# Patient Record
Sex: MALE | Race: WHITE | NOT HISPANIC OR LATINO | Employment: OTHER | URBAN - METROPOLITAN AREA
[De-identification: names, ages, dates, MRNs, and addresses within clinical notes are randomized per-mention and may not be internally consistent; named-entity substitution may affect disease eponyms.]

---

## 2021-01-15 DIAGNOSIS — Z23 NEED FOR VACCINATION: ICD-10-CM

## 2024-05-28 ENCOUNTER — HOSPITAL ENCOUNTER (OUTPATIENT)
Dept: RADIOLOGY | Facility: MEDICAL CENTER | Age: 79
End: 2024-05-28
Attending: UROLOGY
Payer: MEDICARE

## 2024-05-28 DIAGNOSIS — C61 MALIGNANT NEOPLASM OF PROSTATE (HCC): ICD-10-CM

## 2024-07-08 ENCOUNTER — APPOINTMENT (OUTPATIENT)
Dept: RADIOLOGY | Facility: MEDICAL CENTER | Age: 79
End: 2024-07-08
Attending: UROLOGY
Payer: MEDICARE

## 2024-07-08 DIAGNOSIS — C61 MALIGNANT NEOPLASM PROSTATE (HCC): ICD-10-CM

## 2024-07-08 PROCEDURE — 72197 MRI PELVIS W/O & W/DYE: CPT

## 2024-07-08 PROCEDURE — 700111 HCHG RX REV CODE 636 W/ 250 OVERRIDE (IP): Mod: JZ,JG | Performed by: RADIOLOGY

## 2024-07-08 PROCEDURE — A9579 GAD-BASE MR CONTRAST NOS,1ML: HCPCS | Mod: JZ

## 2024-07-08 PROCEDURE — 700117 HCHG RX CONTRAST REV CODE 255: Mod: JZ

## 2024-07-08 RX ADMIN — GLUCAGON 1 MG: 1 INJECTION, POWDER, LYOPHILIZED, FOR SOLUTION INTRAMUSCULAR; INTRAVENOUS at 09:45

## 2024-07-08 RX ADMIN — GADOTERIDOL 20 ML: 279.3 INJECTION, SOLUTION INTRAVENOUS at 10:26

## 2024-08-19 ENCOUNTER — HOSPITAL ENCOUNTER (INPATIENT)
Facility: MEDICAL CENTER | Age: 79
LOS: 3 days | DRG: 193 | End: 2024-08-22
Attending: EMERGENCY MEDICINE | Admitting: STUDENT IN AN ORGANIZED HEALTH CARE EDUCATION/TRAINING PROGRAM
Payer: MEDICARE

## 2024-08-19 ENCOUNTER — APPOINTMENT (OUTPATIENT)
Dept: CARDIOLOGY | Facility: MEDICAL CENTER | Age: 79
DRG: 193 | End: 2024-08-19
Attending: STUDENT IN AN ORGANIZED HEALTH CARE EDUCATION/TRAINING PROGRAM
Payer: MEDICARE

## 2024-08-19 ENCOUNTER — APPOINTMENT (OUTPATIENT)
Dept: RADIOLOGY | Facility: MEDICAL CENTER | Age: 79
DRG: 193 | End: 2024-08-19
Attending: EMERGENCY MEDICINE
Payer: MEDICARE

## 2024-08-19 ENCOUNTER — APPOINTMENT (OUTPATIENT)
Dept: RADIOLOGY | Facility: MEDICAL CENTER | Age: 79
DRG: 193 | End: 2024-08-19
Attending: STUDENT IN AN ORGANIZED HEALTH CARE EDUCATION/TRAINING PROGRAM
Payer: MEDICARE

## 2024-08-19 DIAGNOSIS — I05.0 SEVERE MITRAL VALVE STENOSIS: Chronic | ICD-10-CM

## 2024-08-19 DIAGNOSIS — R09.02 HYPOXIA: ICD-10-CM

## 2024-08-19 DIAGNOSIS — R01.1 MURMUR, CARDIAC: ICD-10-CM

## 2024-08-19 DIAGNOSIS — R79.89 ELEVATED BRAIN NATRIURETIC PEPTIDE (BNP) LEVEL: ICD-10-CM

## 2024-08-19 DIAGNOSIS — J18.9 PNEUMONIA OF RIGHT LUNG DUE TO INFECTIOUS ORGANISM, UNSPECIFIED PART OF LUNG: ICD-10-CM

## 2024-08-19 DIAGNOSIS — I35.0 SEVERE AORTIC STENOSIS: Chronic | ICD-10-CM

## 2024-08-19 DIAGNOSIS — I50.9 ACUTE CONGESTIVE HEART FAILURE, UNSPECIFIED HEART FAILURE TYPE (HCC): ICD-10-CM

## 2024-08-19 PROBLEM — J81.0 ACUTE PULMONARY EDEMA (HCC): Status: ACTIVE | Noted: 2024-08-19

## 2024-08-19 PROBLEM — I34.0 NONRHEUMATIC MITRAL VALVE REGURGITATION: Status: ACTIVE | Noted: 2024-08-19

## 2024-08-19 PROBLEM — C61 PROSTATE CANCER (HCC): Status: ACTIVE | Noted: 2024-08-19

## 2024-08-19 PROBLEM — J96.01 ACUTE RESPIRATORY FAILURE WITH HYPOXIA (HCC): Status: ACTIVE | Noted: 2024-08-19

## 2024-08-19 PROBLEM — E11.65 TYPE 2 DIABETES MELLITUS WITH HYPERGLYCEMIA, WITHOUT LONG-TERM CURRENT USE OF INSULIN (HCC): Status: ACTIVE | Noted: 2024-08-19

## 2024-08-19 PROBLEM — E87.29 HIGH ANION GAP METABOLIC ACIDOSIS: Status: ACTIVE | Noted: 2024-08-19

## 2024-08-19 PROBLEM — I10 PRIMARY HYPERTENSION: Status: ACTIVE | Noted: 2024-08-19

## 2024-08-19 LAB
ALBUMIN SERPL BCP-MCNC: 4.1 G/DL (ref 3.2–4.9)
ALBUMIN/GLOB SERPL: 1.3 G/DL
ALP SERPL-CCNC: 43 U/L (ref 30–99)
ALT SERPL-CCNC: 20 U/L (ref 2–50)
ANION GAP SERPL CALC-SCNC: 16 MMOL/L (ref 7–16)
APPEARANCE UR: CLEAR
AST SERPL-CCNC: 25 U/L (ref 12–45)
BASOPHILS # BLD AUTO: 0.1 % (ref 0–1.8)
BASOPHILS # BLD: 0.02 K/UL (ref 0–0.12)
BILIRUB SERPL-MCNC: 1 MG/DL (ref 0.1–1.5)
BILIRUB UR QL STRIP.AUTO: NEGATIVE
BUN SERPL-MCNC: 32 MG/DL (ref 8–22)
CALCIUM ALBUM COR SERPL-MCNC: 10 MG/DL (ref 8.5–10.5)
CALCIUM SERPL-MCNC: 10.1 MG/DL (ref 8.5–10.5)
CHLORIDE SERPL-SCNC: 104 MMOL/L (ref 96–112)
CO2 SERPL-SCNC: 20 MMOL/L (ref 20–33)
COLOR UR: YELLOW
CREAT SERPL-MCNC: 1.1 MG/DL (ref 0.5–1.4)
D DIMER PPP IA.FEU-MCNC: 0.5 UG/ML (FEU) (ref 0–0.5)
EKG IMPRESSION: NORMAL
EOSINOPHIL # BLD AUTO: 0 K/UL (ref 0–0.51)
EOSINOPHIL NFR BLD: 0 % (ref 0–6.9)
ERYTHROCYTE [DISTWIDTH] IN BLOOD BY AUTOMATED COUNT: 50.7 FL (ref 35.9–50)
FLUAV RNA SPEC QL NAA+PROBE: NEGATIVE
FLUBV RNA SPEC QL NAA+PROBE: NEGATIVE
GFR SERPLBLD CREATININE-BSD FMLA CKD-EPI: 68 ML/MIN/1.73 M 2
GLOBULIN SER CALC-MCNC: 3.2 G/DL (ref 1.9–3.5)
GLUCOSE SERPL-MCNC: 194 MG/DL (ref 65–99)
GLUCOSE UR STRIP.AUTO-MCNC: NEGATIVE MG/DL
HCT VFR BLD AUTO: 42.5 % (ref 42–52)
HGB BLD-MCNC: 14.1 G/DL (ref 14–18)
HOLDING TUBE BB 8507: NORMAL
IMM GRANULOCYTES # BLD AUTO: 0.09 K/UL (ref 0–0.11)
IMM GRANULOCYTES NFR BLD AUTO: 0.6 % (ref 0–0.9)
KETONES UR STRIP.AUTO-MCNC: NEGATIVE MG/DL
LACTATE SERPL-SCNC: 1.2 MMOL/L (ref 0.5–2)
LACTATE SERPL-SCNC: 1.7 MMOL/L (ref 0.5–2)
LACTATE SERPL-SCNC: 2.5 MMOL/L (ref 0.5–2)
LEUKOCYTE ESTERASE UR QL STRIP.AUTO: NEGATIVE
LV EJECT FRACT  99904: 70
LV EJECT FRACT MOD 2C 99903: 66.26
LV EJECT FRACT MOD 4C 99902: 69.34
LV EJECT FRACT MOD BP 99901: 65.59
LYMPHOCYTES # BLD AUTO: 0.73 K/UL (ref 1–4.8)
LYMPHOCYTES NFR BLD: 4.9 % (ref 22–41)
MCH RBC QN AUTO: 30.3 PG (ref 27–33)
MCHC RBC AUTO-ENTMCNC: 33.2 G/DL (ref 32.3–36.5)
MCV RBC AUTO: 91.2 FL (ref 81.4–97.8)
MICRO URNS: NORMAL
MONOCYTES # BLD AUTO: 1.65 K/UL (ref 0–0.85)
MONOCYTES NFR BLD AUTO: 11.2 % (ref 0–13.4)
NEUTROPHILS # BLD AUTO: 12.3 K/UL (ref 1.82–7.42)
NEUTROPHILS NFR BLD: 83.2 % (ref 44–72)
NITRITE UR QL STRIP.AUTO: NEGATIVE
NRBC # BLD AUTO: 0 K/UL
NRBC BLD-RTO: 0 /100 WBC (ref 0–0.2)
NT-PROBNP SERPL IA-MCNC: 5495 PG/ML (ref 0–125)
PH UR STRIP.AUTO: 5 [PH] (ref 5–8)
PLATELET # BLD AUTO: 175 K/UL (ref 164–446)
PMV BLD AUTO: 11.6 FL (ref 9–12.9)
POTASSIUM SERPL-SCNC: 4 MMOL/L (ref 3.6–5.5)
PROCALCITONIN SERPL-MCNC: 0.1 NG/ML
PROT SERPL-MCNC: 7.3 G/DL (ref 6–8.2)
PROT UR QL STRIP: NEGATIVE MG/DL
RBC # BLD AUTO: 4.66 M/UL (ref 4.7–6.1)
RBC UR QL AUTO: NEGATIVE
RSV RNA SPEC QL NAA+PROBE: NEGATIVE
SARS-COV-2 RNA RESP QL NAA+PROBE: NOTDETECTED
SODIUM SERPL-SCNC: 140 MMOL/L (ref 135–145)
SP GR UR STRIP.AUTO: 1.02
TROPONIN T SERPL-MCNC: 70 NG/L (ref 6–19)
TROPONIN T SERPL-MCNC: 84 NG/L (ref 6–19)
TROPONIN T SERPL-MCNC: 88 NG/L (ref 6–19)
UROBILINOGEN UR STRIP.AUTO-MCNC: 0.2 MG/DL
WBC # BLD AUTO: 14.8 K/UL (ref 4.8–10.8)

## 2024-08-19 PROCEDURE — 87086 URINE CULTURE/COLONY COUNT: CPT

## 2024-08-19 PROCEDURE — 700102 HCHG RX REV CODE 250 W/ 637 OVERRIDE(OP): Performed by: EMERGENCY MEDICINE

## 2024-08-19 PROCEDURE — 93005 ELECTROCARDIOGRAM TRACING: CPT | Performed by: EMERGENCY MEDICINE

## 2024-08-19 PROCEDURE — 36415 COLL VENOUS BLD VENIPUNCTURE: CPT

## 2024-08-19 PROCEDURE — 83605 ASSAY OF LACTIC ACID: CPT | Mod: 91

## 2024-08-19 PROCEDURE — 770020 HCHG ROOM/CARE - TELE (206)

## 2024-08-19 PROCEDURE — A9270 NON-COVERED ITEM OR SERVICE: HCPCS | Performed by: STUDENT IN AN ORGANIZED HEALTH CARE EDUCATION/TRAINING PROGRAM

## 2024-08-19 PROCEDURE — 71250 CT THORAX DX C-: CPT

## 2024-08-19 PROCEDURE — 93005 ELECTROCARDIOGRAM TRACING: CPT

## 2024-08-19 PROCEDURE — 93306 TTE W/DOPPLER COMPLETE: CPT

## 2024-08-19 PROCEDURE — 80053 COMPREHEN METABOLIC PANEL: CPT

## 2024-08-19 PROCEDURE — 81003 URINALYSIS AUTO W/O SCOPE: CPT

## 2024-08-19 PROCEDURE — 700111 HCHG RX REV CODE 636 W/ 250 OVERRIDE (IP): Mod: JZ | Performed by: STUDENT IN AN ORGANIZED HEALTH CARE EDUCATION/TRAINING PROGRAM

## 2024-08-19 PROCEDURE — 96375 TX/PRO/DX INJ NEW DRUG ADDON: CPT

## 2024-08-19 PROCEDURE — 700111 HCHG RX REV CODE 636 W/ 250 OVERRIDE (IP): Mod: JZ | Performed by: EMERGENCY MEDICINE

## 2024-08-19 PROCEDURE — 83880 ASSAY OF NATRIURETIC PEPTIDE: CPT

## 2024-08-19 PROCEDURE — 700105 HCHG RX REV CODE 258: Performed by: EMERGENCY MEDICINE

## 2024-08-19 PROCEDURE — 99285 EMERGENCY DEPT VISIT HI MDM: CPT

## 2024-08-19 PROCEDURE — 0241U HCHG SARS-COV-2 COVID-19 NFCT DS RESP RNA 4 TRGT ED POC: CPT

## 2024-08-19 PROCEDURE — 93306 TTE W/DOPPLER COMPLETE: CPT | Mod: 26 | Performed by: INTERNAL MEDICINE

## 2024-08-19 PROCEDURE — 71045 X-RAY EXAM CHEST 1 VIEW: CPT

## 2024-08-19 PROCEDURE — 700102 HCHG RX REV CODE 250 W/ 637 OVERRIDE(OP): Performed by: STUDENT IN AN ORGANIZED HEALTH CARE EDUCATION/TRAINING PROGRAM

## 2024-08-19 PROCEDURE — 84145 PROCALCITONIN (PCT): CPT

## 2024-08-19 PROCEDURE — 99223 1ST HOSP IP/OBS HIGH 75: CPT | Mod: AI | Performed by: STUDENT IN AN ORGANIZED HEALTH CARE EDUCATION/TRAINING PROGRAM

## 2024-08-19 PROCEDURE — 85025 COMPLETE CBC W/AUTO DIFF WBC: CPT

## 2024-08-19 PROCEDURE — 84484 ASSAY OF TROPONIN QUANT: CPT

## 2024-08-19 PROCEDURE — 85379 FIBRIN DEGRADATION QUANT: CPT

## 2024-08-19 PROCEDURE — 96365 THER/PROPH/DIAG IV INF INIT: CPT

## 2024-08-19 PROCEDURE — A9270 NON-COVERED ITEM OR SERVICE: HCPCS | Performed by: EMERGENCY MEDICINE

## 2024-08-19 RX ORDER — FENOFIBRATE 160 MG/1
160 TABLET ORAL DAILY
COMMUNITY
Start: 2024-01-22

## 2024-08-19 RX ORDER — FENOFIBRATE 134 MG/1
134 CAPSULE ORAL EVERY EVENING
Status: DISCONTINUED | OUTPATIENT
Start: 2024-08-19 | End: 2024-08-22 | Stop reason: HOSPADM

## 2024-08-19 RX ORDER — VIBEGRON 75 MG/1
75 TABLET, FILM COATED ORAL DAILY
Status: SHIPPED | COMMUNITY
Start: 2024-05-13 | End: 2024-08-19

## 2024-08-19 RX ORDER — METOPROLOL SUCCINATE 50 MG/1
50 TABLET, EXTENDED RELEASE ORAL DAILY
Status: DISCONTINUED | OUTPATIENT
Start: 2024-08-20 | End: 2024-08-22 | Stop reason: HOSPADM

## 2024-08-19 RX ORDER — IBUPROFEN 200 MG
400 TABLET ORAL EVERY 8 HOURS PRN
Status: ON HOLD | COMMUNITY
End: 2024-08-22

## 2024-08-19 RX ORDER — ONDANSETRON 4 MG/1
4 TABLET, ORALLY DISINTEGRATING ORAL EVERY 4 HOURS PRN
Status: DISCONTINUED | OUTPATIENT
Start: 2024-08-19 | End: 2024-08-22 | Stop reason: HOSPADM

## 2024-08-19 RX ORDER — FENOFIBRATE 160 MG/1
160 TABLET ORAL DAILY
Status: DISCONTINUED | OUTPATIENT
Start: 2024-08-19 | End: 2024-08-19

## 2024-08-19 RX ORDER — ENOXAPARIN SODIUM 100 MG/ML
40 INJECTION SUBCUTANEOUS DAILY
Status: DISCONTINUED | OUTPATIENT
Start: 2024-08-19 | End: 2024-08-22 | Stop reason: HOSPADM

## 2024-08-19 RX ORDER — AZITHROMYCIN 250 MG/1
500 TABLET, FILM COATED ORAL ONCE
Status: COMPLETED | OUTPATIENT
Start: 2024-08-19 | End: 2024-08-19

## 2024-08-19 RX ORDER — METOPROLOL SUCCINATE 50 MG/1
50 TABLET, EXTENDED RELEASE ORAL DAILY
COMMUNITY
Start: 2024-01-22

## 2024-08-19 RX ORDER — PRAVASTATIN SODIUM 40 MG
40 TABLET ORAL NIGHTLY
COMMUNITY
Start: 2024-01-22

## 2024-08-19 RX ORDER — ACETAMINOPHEN 500 MG
1000 TABLET ORAL EVERY 6 HOURS PRN
Status: DISCONTINUED | OUTPATIENT
Start: 2024-08-19 | End: 2024-08-22 | Stop reason: HOSPADM

## 2024-08-19 RX ORDER — AMLODIPINE BESYLATE 10 MG/1
10 TABLET ORAL DAILY
COMMUNITY
Start: 2024-01-22

## 2024-08-19 RX ORDER — ADENOSINE 3 MG/ML
INJECTION, SOLUTION INTRAVENOUS
Status: DISCONTINUED
Start: 2024-08-19 | End: 2024-08-19

## 2024-08-19 RX ORDER — AMLODIPINE BESYLATE 10 MG/1
10 TABLET ORAL DAILY
Status: DISCONTINUED | OUTPATIENT
Start: 2024-08-20 | End: 2024-08-22 | Stop reason: HOSPADM

## 2024-08-19 RX ORDER — PRAVASTATIN SODIUM 20 MG
40 TABLET ORAL NIGHTLY
Status: DISCONTINUED | OUTPATIENT
Start: 2024-08-19 | End: 2024-08-22 | Stop reason: HOSPADM

## 2024-08-19 RX ORDER — MIRABEGRON 50 MG/1
50 TABLET, EXTENDED RELEASE ORAL NIGHTLY
Status: DISCONTINUED | OUTPATIENT
Start: 2024-08-19 | End: 2024-08-19

## 2024-08-19 RX ORDER — MIRABEGRON 50 MG/1
50 TABLET, FILM COATED, EXTENDED RELEASE ORAL
COMMUNITY
Start: 2024-01-22

## 2024-08-19 RX ORDER — FUROSEMIDE 10 MG/ML
40 INJECTION INTRAMUSCULAR; INTRAVENOUS ONCE
Status: COMPLETED | OUTPATIENT
Start: 2024-08-19 | End: 2024-08-19

## 2024-08-19 RX ORDER — LOSARTAN POTASSIUM AND HYDROCHLOROTHIAZIDE 12.5; 1 MG/1; MG/1
1 TABLET ORAL DAILY
COMMUNITY
Start: 2024-01-22

## 2024-08-19 RX ORDER — AZITHROMYCIN 500 MG/5ML
500 INJECTION, POWDER, LYOPHILIZED, FOR SOLUTION INTRAVENOUS EVERY 24 HOURS
Status: DISCONTINUED | OUTPATIENT
Start: 2024-08-20 | End: 2024-08-20

## 2024-08-19 RX ORDER — FUROSEMIDE 10 MG/ML
40 INJECTION INTRAMUSCULAR; INTRAVENOUS EVERY 8 HOURS
Status: DISCONTINUED | OUTPATIENT
Start: 2024-08-19 | End: 2024-08-20

## 2024-08-19 RX ORDER — ONDANSETRON 2 MG/ML
4 INJECTION INTRAMUSCULAR; INTRAVENOUS EVERY 4 HOURS PRN
Status: DISCONTINUED | OUTPATIENT
Start: 2024-08-19 | End: 2024-08-22 | Stop reason: HOSPADM

## 2024-08-19 RX ADMIN — FENOFIBRATE 134 MG: 134 CAPSULE ORAL at 17:13

## 2024-08-19 RX ADMIN — PRAVASTATIN SODIUM 40 MG: 20 TABLET ORAL at 21:34

## 2024-08-19 RX ADMIN — VIBEGRON 75 MG: 75 TABLET, FILM COATED ORAL at 17:14

## 2024-08-19 RX ADMIN — FUROSEMIDE 40 MG: 10 INJECTION INTRAMUSCULAR; INTRAVENOUS at 21:33

## 2024-08-19 RX ADMIN — ENOXAPARIN SODIUM 40 MG: 100 INJECTION SUBCUTANEOUS at 17:12

## 2024-08-19 RX ADMIN — AMPICILLIN AND SULBACTAM 3 G: 1; 2 INJECTION, POWDER, FOR SOLUTION INTRAMUSCULAR; INTRAVENOUS at 11:45

## 2024-08-19 RX ADMIN — AZITHROMYCIN DIHYDRATE 500 MG: 250 TABLET ORAL at 11:46

## 2024-08-19 RX ADMIN — FUROSEMIDE 40 MG: 10 INJECTION INTRAMUSCULAR; INTRAVENOUS at 10:43

## 2024-08-19 SDOH — ECONOMIC STABILITY: TRANSPORTATION INSECURITY
IN THE PAST 12 MONTHS, HAS THE LACK OF TRANSPORTATION KEPT YOU FROM MEDICAL APPOINTMENTS OR FROM GETTING MEDICATIONS?: NO

## 2024-08-19 SDOH — ECONOMIC STABILITY: TRANSPORTATION INSECURITY
IN THE PAST 12 MONTHS, HAS LACK OF RELIABLE TRANSPORTATION KEPT YOU FROM MEDICAL APPOINTMENTS, MEETINGS, WORK OR FROM GETTING THINGS NEEDED FOR DAILY LIVING?: NO

## 2024-08-19 ASSESSMENT — ENCOUNTER SYMPTOMS
COUGH: 1
BACK PAIN: 0
BLOOD IN STOOL: 0
BRUISES/BLEEDS EASILY: 0
SINUS PAIN: 0
SORE THROAT: 0
WEAKNESS: 0
NERVOUS/ANXIOUS: 0
CONSTIPATION: 0
ORTHOPNEA: 0
NECK PAIN: 0
VOMITING: 0
EYE PAIN: 0
DIARRHEA: 0
FLANK PAIN: 0
LOSS OF CONSCIOUSNESS: 0
MYALGIAS: 0
PND: 0
FALLS: 0
FEVER: 0
SPUTUM PRODUCTION: 1
SHORTNESS OF BREATH: 1
NAUSEA: 1
CHILLS: 1
HEMOPTYSIS: 1
DIZZINESS: 1
ABDOMINAL PAIN: 0
DEPRESSION: 0

## 2024-08-19 ASSESSMENT — COGNITIVE AND FUNCTIONAL STATUS - GENERAL
MOBILITY SCORE: 23
CLIMB 3 TO 5 STEPS WITH RAILING: A LITTLE
SUGGESTED CMS G CODE MODIFIER MOBILITY: CI
DAILY ACTIVITIY SCORE: 24
SUGGESTED CMS G CODE MODIFIER DAILY ACTIVITY: CH

## 2024-08-19 ASSESSMENT — LIFESTYLE VARIABLES
HAVE YOU EVER FELT YOU SHOULD CUT DOWN ON YOUR DRINKING: NO
AVERAGE NUMBER OF DAYS PER WEEK YOU HAVE A DRINK CONTAINING ALCOHOL: 4
TOTAL SCORE: 0
ALCOHOL_USE: YES
EVER FELT BAD OR GUILTY ABOUT YOUR DRINKING: NO
HAVE PEOPLE ANNOYED YOU BY CRITICIZING YOUR DRINKING: NO
TOTAL SCORE: 0
TOTAL SCORE: 0
ON A TYPICAL DAY WHEN YOU DRINK ALCOHOL HOW MANY DRINKS DO YOU HAVE: 1
DOES PATIENT WANT TO STOP DRINKING: NO
HOW MANY TIMES IN THE PAST YEAR HAVE YOU HAD 5 OR MORE DRINKS IN A DAY: 0
CONSUMPTION TOTAL: NEGATIVE
EVER HAD A DRINK FIRST THING IN THE MORNING TO STEADY YOUR NERVES TO GET RID OF A HANGOVER: NO

## 2024-08-19 ASSESSMENT — SOCIAL DETERMINANTS OF HEALTH (SDOH)
WITHIN THE LAST YEAR, HAVE TO BEEN RAPED OR FORCED TO HAVE ANY KIND OF SEXUAL ACTIVITY BY YOUR PARTNER OR EX-PARTNER?: NO
WITHIN THE PAST 12 MONTHS, YOU WORRIED THAT YOUR FOOD WOULD RUN OUT BEFORE YOU GOT THE MONEY TO BUY MORE: NEVER TRUE
WITHIN THE LAST YEAR, HAVE YOU BEEN HUMILIATED OR EMOTIONALLY ABUSED IN OTHER WAYS BY YOUR PARTNER OR EX-PARTNER?: NO
WITHIN THE PAST 12 MONTHS, THE FOOD YOU BOUGHT JUST DIDN'T LAST AND YOU DIDN'T HAVE MONEY TO GET MORE: NEVER TRUE
IN THE PAST 12 MONTHS, HAS THE ELECTRIC, GAS, OIL, OR WATER COMPANY THREATENED TO SHUT OFF SERVICE IN YOUR HOME?: NO
WITHIN THE LAST YEAR, HAVE YOU BEEN AFRAID OF YOUR PARTNER OR EX-PARTNER?: NO
WITHIN THE LAST YEAR, HAVE YOU BEEN KICKED, HIT, SLAPPED, OR OTHERWISE PHYSICALLY HURT BY YOUR PARTNER OR EX-PARTNER?: NO

## 2024-08-19 ASSESSMENT — PATIENT HEALTH QUESTIONNAIRE - PHQ9
SUM OF ALL RESPONSES TO PHQ9 QUESTIONS 1 AND 2: 0
1. LITTLE INTEREST OR PLEASURE IN DOING THINGS: NOT AT ALL
2. FEELING DOWN, DEPRESSED, IRRITABLE, OR HOPELESS: NOT AT ALL

## 2024-08-19 ASSESSMENT — FIBROSIS 4 INDEX: FIB4 SCORE: 2.56

## 2024-08-19 NOTE — ASSESSMENT & PLAN NOTE
Follows with urologists Dr. Wilkins and Dr. Izquierdo  Initiation of XRT deferred to outpatient while treating acute cardiopulmonary illness  Continue mybertiq

## 2024-08-19 NOTE — H&P
"Hospital Medicine History & Physical Note    Date of Service  8/19/2024    Primary Care Physician  Francois Penaloza M.D.    Consultants  None    Specialist Names: N/A    Code Status  Full Code    Chief Complaint  Chief Complaint   Patient presents with    Shortness of Breath           Cough    Chills       History of Presenting Illness  Juan Manuel Martinez is a 79 y.o. male with HTN, prostate cancer, and T2DM who presented 8/19/2024 with Right chest discomfort and AHRF.    He reports for the past few days he's experienced worsening fatigue, shortness of breath, and \"chest congestion\" exacerbated by laying on the Right side. He reports a cough occasionally productive of \"pink froth\". He has been sleeping upright in a chair due to discomfort. He has noted a bubbling and low-pitch wheezing noise from the Right side of his chest. He reports chills and nausea without fever nor vomiting. He has felt lightheaded which is not positional, denies syncope, fall. He denies orthopnea, weight gain, peripheral edema. He denies bleeding, bowel/bladder dysfunction.    In the ED he presented with tachycardia and hypoxia requiring 4 L/min to maintain 90%. CBC notable for WBC 14.8, CMP notable for AGMA 20/16, . Lactate 2.5. BNP 5.5K. Respiratory PCR negative for flu, covid, RSV. EKG demosntrates NSR with 3x PVCs, LVH, possible ST depressions V4-V6. CXR Right-sided infiltrate. Troponin 70. Ddimer 0.5. Procal 0.1. In the ED he received IV lasix 40 mg and unasyn/azithromycin for CAP.    I discussed the plan of care with patient, family, bedside RN, and ED provider .    Review of Systems  Review of Systems   Constitutional:  Positive for chills and malaise/fatigue. Negative for fever.   HENT:  Negative for ear pain, nosebleeds, sinus pain and sore throat.    Eyes:  Negative for pain.   Respiratory:  Positive for cough, hemoptysis, sputum production and shortness of breath.    Cardiovascular:  Negative for chest pain, orthopnea, leg " swelling and PND.   Gastrointestinal:  Positive for nausea. Negative for abdominal pain, blood in stool, constipation, diarrhea, melena and vomiting.   Genitourinary:  Negative for dysuria and flank pain.   Musculoskeletal:  Negative for back pain, falls, joint pain, myalgias and neck pain.   Skin:  Negative for rash.   Neurological:  Positive for dizziness. Negative for loss of consciousness and weakness.   Endo/Heme/Allergies:  Does not bruise/bleed easily.   Psychiatric/Behavioral:  Negative for depression. The patient is not nervous/anxious.        Past Medical History   has a past medical history of Diabetes (HCC), Hypertension, Prostate cancer (HCC), and Prostate cancer (HCC).    Surgical History   has a past surgical history that includes prostatectomy, radial.     Family History  family history includes Breast Cancer in his mother; Hypertension in his mother.   Family history reviewed with patient. There is no family history that is pertinent to the chief complaint.     Social History   reports that he has never smoked. He has never used smokeless tobacco. He reports current alcohol use of about 2.4 - 3.0 oz of alcohol per week. He reports current drug use. Drug: Inhaled.    Allergies  No Known Allergies    Medications  Prior to Admission Medications   Prescriptions Last Dose Informant Patient Reported? Taking?   MYRBETRIQ 50 MG TABLET SR 24 HR 8/18/2024 at PM Patient Yes Yes   Sig: Take 50 mg by mouth every day.   amLODIPine (NORVASC) 10 MG Tab 8/19/2024 at 0800 Patient Yes Yes   Sig: Take 10 mg by mouth every day.   fenofibrate (TRIGLIDE) 160 MG tablet 8/18/2024 at PM Patient Yes Yes   Sig: Take 160 mg by mouth every day.   ibuprofen (MOTRIN) 200 MG Tab 8/19/2024 at 0300 Patient Yes Yes   Sig: Take 400 mg by mouth every 8 hours as needed for Mild Pain.   losartan-hydrochlorothiazide (HYZAAR) 100-12.5 MG per tablet 8/19/2024 at 0800 Patient Yes Yes   Sig: Take 1 Tablet by mouth every day.   metformin  (GLUCOPHAGE) 1000 MG tablet 8/19/2024 at 0800 Patient Yes Yes   Sig: Take 1,000 mg by mouth 2 times a day with meals.   metoprolol SR (TOPROL XL) 50 MG TABLET SR 24 HR 8/19/2024 at 0800 Patient Yes Yes   Sig: Take 50 mg by mouth every day.   pravastatin (PRAVACHOL) 40 MG tablet 8/18/2024 at PM Patient Yes Yes   Sig: Take 40 mg by mouth every evening.      Facility-Administered Medications: None       Physical Exam  Temp:  [36.4 °C (97.5 °F)] 36.4 °C (97.5 °F)  Pulse:  [] 94  Resp:  [18-20] 20  BP: (116-118)/(77) 116/77  SpO2:  [90 %-91 %] 90 %  Blood Pressure : 116/77   Temperature: 36.4 °C (97.5 °F)   Pulse: 94   Respiration: 20   Pulse Oximetry: 90 %       Physical Exam  Vitals and nursing note reviewed. Exam conducted with a chaperone present (Wife at bedside).   Constitutional:       General: He is not in acute distress.     Appearance: He is not ill-appearing, toxic-appearing or diaphoretic.      Interventions: Nasal cannula in place.   HENT:      Nose: Nose normal.      Mouth/Throat:      Mouth: Mucous membranes are dry.   Eyes:      General: No scleral icterus.     Conjunctiva/sclera: Conjunctivae normal.   Cardiovascular:      Rate and Rhythm: Regular rhythm. Tachycardia present.      Pulses: Normal pulses.      Heart sounds: Normal heart sounds. No murmur (3/6 systolic murmur most appreciable LLSB with radiation to axilla but not carotids) heard.     No friction rub. No gallop.   Pulmonary:      Effort: Pulmonary effort is normal. No respiratory distress.      Breath sounds: Rhonchi and rales (Coarse Right anterior and posterior lung fields) present. No wheezing.   Abdominal:      General: Abdomen is flat. Bowel sounds are normal. There is no distension.      Palpations: Abdomen is soft.      Tenderness: There is no abdominal tenderness. There is no guarding or rebound.   Genitourinary:     Comments: No bingham, Urine holger / clear  Musculoskeletal:      Cervical back: Neck supple.      Right lower leg:  "Edema present.      Left lower leg: Edema present.      Comments: Trace pretibial   Skin:     General: Skin is warm and dry.   Neurological:      Mental Status: He is alert.      Comments: Appropriately conversant, moves all extremities   Psychiatric:         Mood and Affect: Mood normal.         Behavior: Behavior normal.         Thought Content: Thought content normal.         Judgment: Judgment normal.         Laboratory:  Recent Labs     08/19/24  0946   WBC 14.8*   RBC 4.66*   HEMOGLOBIN 14.1   HEMATOCRIT 42.5   MCV 91.2   MCH 30.3   MCHC 33.2   RDW 50.7*   PLATELETCT 175   MPV 11.6     Recent Labs     08/19/24  0946   SODIUM 140   POTASSIUM 4.0   CHLORIDE 104   CO2 20   GLUCOSE 194*   BUN 32*   CREATININE 1.10   CALCIUM 10.1     Recent Labs     08/19/24  0946   ALTSGPT 20   ASTSGOT 25   ALKPHOSPHAT 43   TBILIRUBIN 1.0   GLUCOSE 194*         Recent Labs     08/19/24  0946   NTPROBNP 5495*         No results for input(s): \"TROPONINT\" in the last 72 hours.    Imaging:  DX-CHEST-PORTABLE (1 VIEW)   Final Result      1.  RIGHT midlung pneumonia. Underlying mass not excluded. Recommend attention on follow-up.   2.  Cardiomegaly      EC-ECHOCARDIOGRAM COMPLETE W/O CONT    (Results Pending)       X-Ray:  I have personally reviewed the images and compared with prior images. and My impression is: Right-sided infiltrate  EKG:  I have personally reviewed the images and compared with prior images. and My impression is: NSR, PVC x3, LVH, ?ST depressions V4-V6    Assessment/Plan:  Justification for Admission Status  I anticipate this patient will require at least two midnights for appropriate medical management, necessitating inpatient admission because AHRF of uncertain etiology warranting further diagnostic evaluation. He is empirically treated with IV diuresis with telemetry due to apparent ectopy (PVCs) and signs of ischemia (troponin, ST depressions). Warrants further diagnostic imaging of acute pulmonary " edema.    Patient will need a Telemetry bed on MEDICAL service .  The need is secondary to IV furosemide, additional diagnostic imaging.    * Acute pulmonary edema (HCC)- (present on admission)  Assessment & Plan  Unclear etiology, unilateral suggestive of acute infarct rather than systemic process  Right-sided infiltrate with pink froth concerning for alveolar injury  S/p unasyn and azithromycin in ED - procalcitonin negative, no further benefit expected of antibiotics for CAP  BNP elevated - TTE ordered for asssessment of LVEF / mitral valve  Ddimer normal, PE effectively ruled out  CT chest for further characterization  Empiric diuresis with IV furosemide    Acute respiratory failure with hypoxia (HCC)- (present on admission)  Assessment & Plan  Seemingly due to pulmonary edema / infiltrate of unclear etiology - see separate plan  Respiratory PCR negative for COV/FLU/RSV  RT consulted  Empiric IV diuresis    High anion gap metabolic acidosis- (present on admission)  Assessment & Plan  Due to lactic acidosis  Empiric diuresis due to AHRF and acute pulmonary edema  Repeat lactic and AM BMP    Elevated troponin- (present on admission)  Assessment & Plan  Unclear significance, possibly demand ischemia due to acute pulmonary edema  EKG with ?ST depressions V4-V6 concerning for evolving infarct  Repeat q4h to peak  Telemetry    Type 2 diabetes mellitus with hyperglycemia, without long-term current use of insulin (HCC)- (present on admission)  Assessment & Plan  Holding metformin on admission  Continue pravastatin  No indication for strict BG control - POCT/ SSI deferred    Prostate cancer (HCC)- (present on admission)  Assessment & Plan  Follows with urologists Dr. Wilkins and Dr. Izquierdo  Initiation of XRT deferred to outpatient while treating acute cardiopulmonary illness  Continue mybertiq    Nonrheumatic mitral valve regurgitation- (present on admission)  Assessment & Plan  Clinically apparent by systolic murmur  radiating to axilla  TTE ordered for severity and acute valve prolapse / rupture  Diuresis per separate plan    Primary hypertension- (present on admission)  Assessment & Plan  Continue amlodipine  Holding HCTZ-Lisinopril in setting of loop diuretic for pulmonary edema      VTE prophylaxis: SCDs/TEDs and enoxaparin ppx

## 2024-08-19 NOTE — ED NOTES
Bloodwork collected and sent to lab. Pt remains in bedside chair, with family. Call light within reach.

## 2024-08-19 NOTE — ASSESSMENT & PLAN NOTE
Seemingly due to pulmonary edema / infiltrate of unclear etiology - see separate plan  Respiratory PCR negative for COV/FLU/RSV  RT consulted  Empiric IV diuresis

## 2024-08-19 NOTE — ED NOTES
Pt medicated per MAR. Updated on POC. Provided urinal for UA. Family at bedside. Call light within reach.

## 2024-08-19 NOTE — ASSESSMENT & PLAN NOTE
Clinically apparent by systolic murmur radiating to axilla  TTE ordered for severity and acute valve prolapse / rupture  Diuresis per separate plan

## 2024-08-19 NOTE — ASSESSMENT & PLAN NOTE
Holding metformin on admission  Continue pravastatin  No indication for strict BG control - POCT/ SSI deferred

## 2024-08-19 NOTE — ED NOTES
Pt pivoted from dilip to chair w/NAD. Family remains at bedside. Call light and urinal within reach.

## 2024-08-19 NOTE — ED TRIAGE NOTES
Pt comes in reporting Saturday he became SOB, coughing (blood tinged), pt stating he can not lay down or sleep since. Pt hypoxic on RA, denies home o2 use.

## 2024-08-19 NOTE — ED NOTES
Medication history reviewed with PT at bedside    [facility] rec is complete per PT reporting    Allergies reviewed.     Patient denies any outpatient antibiotics in the last 30 days.     Patient is not taking anticoagulants.    [facility]erred [facility] for this visit - [facility] ([phone])

## 2024-08-19 NOTE — ASSESSMENT & PLAN NOTE
Unclear etiology, unilateral suggestive of acute infarct rather than systemic process  Right-sided infiltrate with pink froth concerning for alveolar injury  S/p unasyn and azithromycin in ED - procalcitonin negative, no further benefit expected of antibiotics for CAP  BNP elevated - TTE ordered for asssessment of LVEF / mitral valve  Ddimer normal, PE effectively ruled out  CT chest for further characterization  Empiric diuresis with IV furosemide

## 2024-08-19 NOTE — ED NOTES
Bedside report received from off going RN/tech: Conrad, assumed care of patient.  POC discussed with patient. Call light within reach, all needs addressed at this time.       Fall risk interventions in place: Not Applicable (all applicable per [facility] Fall risk assessment)   Continuous monitoring: Cardiac Leads, Pulse Ox, or Blood Pressure  IVF/IV medications: Not Applicable   Oxygen: How many liters 4L  Bedside sitter: Not Applicable   Isolation: Not Applicable

## 2024-08-19 NOTE — ED PROVIDER NOTES
"ER Provider Note    Scribed for Petar Coppola M.d. by Nieves Herndon. 8/19/2024  9:44 AM    Primary Care Provider: Francois Penaloza M.D.    CHIEF COMPLAINT   Chief Complaint   Patient presents with    Shortness of Breath           Cough    Chills     EXTERNAL RECORDS REVIEWED  Outpatient Notes Patient does not have an echo on record.     HPI/ROS  LIMITATION TO HISTORY   Select: : None  OUTSIDE HISTORIAN(S):  Patient's wife at bedside to add details to patient history.     Juan Manuel Martinez is a 79 y.o. male who presents to the ED for evaluation of ongoing shortness of breath onset two days ago. Patient states he has been experiencing associated fatigue, decreased appetite, and decreased sleep. He denies any diarrhea, leg pain or swelling. Patient states he took two COVID tests at home. Patient denies any history of pneumonia. He denies using oxygen at home. Patient states he had prostate cancer fifteen years ago and it has reoccurred two months ago.     PAST MEDICAL HISTORY  Past Medical History:   Diagnosis Date    Prostate cancer (HCC)        SURGICAL HISTORY  Past Surgical History:   Procedure Laterality Date    PROSTATECTOMY, RADIAL         FAMILY HISTORY  No family history noted.    SOCIAL HISTORY       CURRENT MEDICATIONS  Previous Medications    No medications noted.       ALLERGIES  Patient has no known allergies.    PHYSICAL EXAM  /77   Pulse (!) 107   Temp 36.4 °C (97.5 °F) (Temporal)   Resp 20   Ht 1.778 m (5' 10\")   Wt 83.9 kg (185 lb)   SpO2 91% Comment: 85RA  BMI 26.54 kg/m²   Constitutional: Well developed, Well nourished, No acute distress, Non-toxic appearance.   HENT: Normocephalic, Atraumatic, Bilateral external ears normal, Oropharynx is clear mucous membranes are moist. No oral exudates or nasal discharge.   Eyes: Pupils are equal round and reactive, EOMI, Conjunctiva normal, No discharge.   Neck: Normal range of motion, No tenderness, Supple, No stridor. No meningismus.  Lymphatic: " No lymphadenopathy noted.   Cardiovascular: Tachycardic, blowing systolic heart murmur, does not radiate to his carotids.   Thorax & Lungs: Clear breath sounds bilaterally without wheezes, rhonchi or rales. There is no chest wall tenderness.   Abdomen: Soft non-tender non-distended. There is no rebound or guarding. No organomegaly is appreciated. Bowel sounds are normal.  Skin: Normal without rash.   Back: No CVA or spinal tenderness.   Extremities: Intact distal pulses, No edema, No tenderness, No cyanosis, No clubbing. Capillary refill is less than 2 seconds.  Musculoskeletal: Good range of motion in all major joints. No tenderness to palpation or major deformities noted.   Neurologic: Alert & oriented x 3, Normal motor function, Normal sensory function, No focal deficits noted. Reflexes are normal.  Psychiatric: Affect normal, Judgment normal, Mood normal. There is no suicidal ideation or patient reported hallucinations.     DIAGNOSTIC STUDIES    EKG/LABS  Labs Reviewed   LACTIC ACID - Abnormal; Notable for the following components:       Result Value    Lactic Acid 2.5 (*)     All other components within normal limits   CBC WITH DIFFERENTIAL - Abnormal; Notable for the following components:    WBC 14.8 (*)     RBC 4.66 (*)     RDW 50.7 (*)     Neutrophils-Polys 83.20 (*)     Lymphocytes 4.90 (*)     Neutrophils (Absolute) 12.30 (*)     Lymphs (Absolute) 0.73 (*)     Monos (Absolute) 1.65 (*)     All other components within normal limits   COMP METABOLIC PANEL - Abnormal; Notable for the following components:    Glucose 194 (*)     Bun 32 (*)     All other components within normal limits   PROBRAIN NATRIURETIC PEPTIDE, NT - Abnormal; Notable for the following components:    NT-proBNP 5495 (*)     All other components within normal limits   COV-2, FLU A/B, AND RSV BY PCR ([employer])   HOLD BLOOD BANK SPECIMEN (NOT TESTED)   ESTIMATED GFR   LACTIC ACID   LACTIC ACID   URINALYSIS   URINE CULTURE(NEW)   POCT COV-2, FLU  A/B, RSV BY PCR   All labs reviewed by me.     Results for orders placed or performed during the hospital encounter of 24   EKG (NOW)   Result Value Ref Range    Report       [facility]    Test Date:  2024  Pt Name:    JUAN ALBERTO SOLORZANO               Department: ER  MRN:        [id number]                      Room:  Gender:     Male                         Technician: [id number]  :        [id number]                   Requested By:ER TRIAGE PROTOCOL  Order #:    [id number]                    Reading MD: ALBERT HEREDIA MD    Measurements  Intervals                                Axis  Rate:       96                           P:          39  MT:         168                          QRS:        23  QRSD:       118                          T:          35  QT:         378  QTc:        478    Interpretive Statements  Sinus rhythm  Multiform ventricular premature complexes  Left atrial enlargement  Left ventricular hypertrophy  Borderline prolonged QT interval  No previous ECG available for comparison  Electronically Signed On 2024 10:37:16 PDT by ALBERT HEREDIA MD     I have independently interpreted this EKG    RADIOLOGY/PROCEDURES   The attending emergency physician has independently interpreted the diagnostic imaging associated with this visit and am waiting the final reading from the radiologist.   My preliminary interpretation is a follows: Pulmonary edema with likely infiltrate right side    Radiologist interpretation:  DX-CHEST-PORTABLE (1 VIEW)   Final Result      1.  RIGHT midlung pneumonia. Underlying mass not excluded. Recommend attention on follow-up.   2.  Cardiomegaly          COURSE & MEDICAL DECISION MAKING     ASSESSMENT, COURSE AND PLAN  Care Narrative:   9:47 AM - Patient was first evaluated at bedside. Patient appears to the Ed for evaluation of ongoing shortness of breath onset two days ago. Patient does not use oxygen at home. I discussed plan of care including  COVID test and BMP. I informed them I believe this is more like CHF rather than sepsis. EKG, DX-CHEST and labs ordered    10:37 AM - Lab/imaging results show BNP is 5495 indicating volume overload. X ray shows pulmonary edema pattern on chest x ray.  I think the patient has significant valvular disease that is quite acute.  This is likely mitral regurgitation but echo should be revealing.  I will hold fluids as this would make him worse in spite of possibility of infiltrate and we will cover him for pneumonia on top of pulmonary edema at this time.      Will admit patient to hospital.     10:41 AM - I spoke with patient and discussed lab/imaging results. I informed patient of plan to admit to hospital. Patient understands and agrees.         Sepsis: Infection was suspected 9:44 AM (Time). Sepsis pathway was initiated. Fluids not needed (no hypotension or lactate greater than or equal to 4). Antibiotics were given per protocol.      DISPOSITION AND DISCUSSIONS  I have discussed management of the patient with the following physicians and JONATHAN's:  Dr. Mitchell (hospitalist)     Discussion of management with other [facility] or appropriate source(s): None     Barriers to care at this time, including but not limited to:  None .     CRITICAL CARE  The very real possibilty of a deterioration of this patient's condition required the highest level of my preparedness for sudden, emergent intervention.  I provided critical care services, which included medication orders, frequent reevaluations of the patient's condition and response to treatment, ordering and reviewing test results, and discussing the case with various consultants.  The critical care time associated with the care of the patient was 30 minutes. Review chart for interventions. This time is exclusive of any other billable procedures.       DISPOSITION:  Patient will be hospitalized by Dr. Mitchell (hospitalist) in guarded condition.      FINAL DIAGNOSIS  1. Acute congestive  heart failure, unspecified heart failure type (HCC)    2. Elevated brain natriuretic peptide (BNP) level    3. Murmur, cardiac    4. Hypoxia     5. Critical care time 30 minutes     INieves (Melissa), am scribing for, and in the presence of, Petar Coppola M.D..    Electronically signed by: Nieves Herndon (Melissa), 8/19/2024    Petar PEARCE M.D. personally performed the services described in this documentation, as scribed by Nieves Herndon in my presence, and it is both accurate and complete.

## 2024-08-19 NOTE — ASSESSMENT & PLAN NOTE
Unclear significance, possibly demand ischemia due to acute pulmonary edema  EKG with ?ST depressions V4-V6 concerning for evolving infarct  Repeat q4h to peak  Telemetry

## 2024-08-19 NOTE — ASSESSMENT & PLAN NOTE
Due to lactic acidosis  Empiric diuresis due to AHRF and acute pulmonary edema  Repeat lactic and AM BMP

## 2024-08-19 NOTE — PROGRESS NOTES
4 Eyes Skin Assessment Completed by Martha RN and Vik RN.    Head WDL  Ears Redness and Blanching  Nose WDL  Mouth WDL  Neck WDL  Breast/Chest WDL  Shoulder Blades WDL  Spine WDL  (R) Arm/Elbow/Hand WDL  (L) Arm/Elbow/Hand WDL  Abdomen WDL  Groin WDL  Scrotum/Coccyx/Buttocks Redness, Blanching, and Excoriation  (R) Leg WDL  (L) Leg WDL  (R) Heel/Foot/Toe Redness, Blanching, and Boggy and calloused  (L) Heel/Foot/Toe Redness, Blanching, and Boggy and calloused    Devices In Places Tele Box, Pulse Ox, and Nasal Cannula    Interventions In Place Gray Ear Foams, Pillows, and Heels Loaded W/Pillows    Possible Skin Injury No    Pictures Uploaded Into Epic Yes  Wound Consult Placed N/A  RN Wound Prevention Protocol Ordered No

## 2024-08-20 PROBLEM — I35.0 AORTIC STENOSIS: Status: ACTIVE | Noted: 2024-08-20

## 2024-08-20 PROBLEM — J18.9 PNEUMONIA: Status: ACTIVE | Noted: 2024-08-20

## 2024-08-20 LAB
ANION GAP SERPL CALC-SCNC: 17 MMOL/L (ref 7–16)
B PARAP IS1001 DNA NPH QL NAA+NON-PROBE: NOT DETECTED
B PERT.PT PRMT NPH QL NAA+NON-PROBE: NOT DETECTED
BUN SERPL-MCNC: 34 MG/DL (ref 8–22)
C PNEUM DNA NPH QL NAA+NON-PROBE: NOT DETECTED
CALCIUM SERPL-MCNC: 8.9 MG/DL (ref 8.5–10.5)
CHLORIDE SERPL-SCNC: 102 MMOL/L (ref 96–112)
CO2 SERPL-SCNC: 24 MMOL/L (ref 20–33)
CREAT SERPL-MCNC: 1.06 MG/DL (ref 0.5–1.4)
ERYTHROCYTE [DISTWIDTH] IN BLOOD BY AUTOMATED COUNT: 50.3 FL (ref 35.9–50)
FLUAV RNA NPH QL NAA+NON-PROBE: NOT DETECTED
FLUBV RNA NPH QL NAA+NON-PROBE: NOT DETECTED
GFR SERPLBLD CREATININE-BSD FMLA CKD-EPI: 71 ML/MIN/1.73 M 2
GLUCOSE SERPL-MCNC: 142 MG/DL (ref 65–99)
HADV DNA NPH QL NAA+NON-PROBE: NOT DETECTED
HCOV 229E RNA NPH QL NAA+NON-PROBE: NOT DETECTED
HCOV HKU1 RNA NPH QL NAA+NON-PROBE: NOT DETECTED
HCOV NL63 RNA NPH QL NAA+NON-PROBE: NOT DETECTED
HCOV OC43 RNA NPH QL NAA+NON-PROBE: NOT DETECTED
HCT VFR BLD AUTO: 36.3 % (ref 42–52)
HGB BLD-MCNC: 12.2 G/DL (ref 14–18)
HMPV RNA NPH QL NAA+NON-PROBE: NOT DETECTED
HPIV1 RNA NPH QL NAA+NON-PROBE: NOT DETECTED
HPIV2 RNA NPH QL NAA+NON-PROBE: NOT DETECTED
HPIV3 RNA NPH QL NAA+NON-PROBE: NOT DETECTED
HPIV4 RNA NPH QL NAA+NON-PROBE: NOT DETECTED
LACTATE SERPL-SCNC: 1.2 MMOL/L (ref 0.5–2)
M PNEUMO DNA NPH QL NAA+NON-PROBE: NOT DETECTED
MAGNESIUM SERPL-MCNC: 1.6 MG/DL (ref 1.5–2.5)
MCH RBC QN AUTO: 30.7 PG (ref 27–33)
MCHC RBC AUTO-ENTMCNC: 33.6 G/DL (ref 32.3–36.5)
MCV RBC AUTO: 91.2 FL (ref 81.4–97.8)
NT-PROBNP SERPL IA-MCNC: 4602 PG/ML (ref 0–125)
PLATELET # BLD AUTO: 153 K/UL (ref 164–446)
PMV BLD AUTO: 11.7 FL (ref 9–12.9)
POTASSIUM SERPL-SCNC: 2.9 MMOL/L (ref 3.6–5.5)
RBC # BLD AUTO: 3.98 M/UL (ref 4.7–6.1)
RSV RNA NPH QL NAA+NON-PROBE: NOT DETECTED
RV+EV RNA NPH QL NAA+NON-PROBE: NOT DETECTED
SARS-COV-2 RNA NPH QL NAA+NON-PROBE: NOTDETECTED
SODIUM SERPL-SCNC: 143 MMOL/L (ref 135–145)
TROPONIN T SERPL-MCNC: 91 NG/L (ref 6–19)
TROPONIN T SERPL-MCNC: 95 NG/L (ref 6–19)
WBC # BLD AUTO: 11.9 K/UL (ref 4.8–10.8)

## 2024-08-20 PROCEDURE — 700111 HCHG RX REV CODE 636 W/ 250 OVERRIDE (IP): Mod: JZ | Performed by: STUDENT IN AN ORGANIZED HEALTH CARE EDUCATION/TRAINING PROGRAM

## 2024-08-20 PROCEDURE — 700102 HCHG RX REV CODE 250 W/ 637 OVERRIDE(OP): Mod: JZ

## 2024-08-20 PROCEDURE — 85027 COMPLETE CBC AUTOMATED: CPT

## 2024-08-20 PROCEDURE — 700101 HCHG RX REV CODE 250

## 2024-08-20 PROCEDURE — 83880 ASSAY OF NATRIURETIC PEPTIDE: CPT

## 2024-08-20 PROCEDURE — 84484 ASSAY OF TROPONIN QUANT: CPT

## 2024-08-20 PROCEDURE — 83605 ASSAY OF LACTIC ACID: CPT

## 2024-08-20 PROCEDURE — A9270 NON-COVERED ITEM OR SERVICE: HCPCS | Mod: JZ | Performed by: INTERNAL MEDICINE

## 2024-08-20 PROCEDURE — A9270 NON-COVERED ITEM OR SERVICE: HCPCS | Mod: JZ

## 2024-08-20 PROCEDURE — 80048 BASIC METABOLIC PNL TOTAL CA: CPT

## 2024-08-20 PROCEDURE — 99233 SBSQ HOSP IP/OBS HIGH 50: CPT | Performed by: INTERNAL MEDICINE

## 2024-08-20 PROCEDURE — 770020 HCHG ROOM/CARE - TELE (206)

## 2024-08-20 PROCEDURE — 83735 ASSAY OF MAGNESIUM: CPT

## 2024-08-20 PROCEDURE — 700105 HCHG RX REV CODE 258

## 2024-08-20 PROCEDURE — 700102 HCHG RX REV CODE 250 W/ 637 OVERRIDE(OP): Performed by: STUDENT IN AN ORGANIZED HEALTH CARE EDUCATION/TRAINING PROGRAM

## 2024-08-20 PROCEDURE — 36415 COLL VENOUS BLD VENIPUNCTURE: CPT

## 2024-08-20 PROCEDURE — 0202U NFCT DS 22 TRGT SARS-COV-2: CPT

## 2024-08-20 PROCEDURE — A9270 NON-COVERED ITEM OR SERVICE: HCPCS | Performed by: STUDENT IN AN ORGANIZED HEALTH CARE EDUCATION/TRAINING PROGRAM

## 2024-08-20 PROCEDURE — 700102 HCHG RX REV CODE 250 W/ 637 OVERRIDE(OP): Mod: JZ | Performed by: INTERNAL MEDICINE

## 2024-08-20 PROCEDURE — 700111 HCHG RX REV CODE 636 W/ 250 OVERRIDE (IP)

## 2024-08-20 RX ORDER — MAGNESIUM SULFATE HEPTAHYDRATE 40 MG/ML
2 INJECTION, SOLUTION INTRAVENOUS ONCE
Status: COMPLETED | OUTPATIENT
Start: 2024-08-20 | End: 2024-08-20

## 2024-08-20 RX ORDER — POTASSIUM CHLORIDE 1500 MG/1
40 TABLET, EXTENDED RELEASE ORAL ONCE
Status: COMPLETED | OUTPATIENT
Start: 2024-08-20 | End: 2024-08-20

## 2024-08-20 RX ORDER — AZITHROMYCIN 250 MG/1
500 TABLET, FILM COATED ORAL DAILY
Status: COMPLETED | OUTPATIENT
Start: 2024-08-21 | End: 2024-08-21

## 2024-08-20 RX ORDER — FUROSEMIDE 10 MG/ML
40 INJECTION INTRAMUSCULAR; INTRAVENOUS
Status: DISCONTINUED | OUTPATIENT
Start: 2024-08-20 | End: 2024-08-20

## 2024-08-20 RX ORDER — FUROSEMIDE 10 MG/ML
40 INJECTION INTRAMUSCULAR; INTRAVENOUS
Status: DISCONTINUED | OUTPATIENT
Start: 2024-08-21 | End: 2024-08-21

## 2024-08-20 RX ADMIN — AMPICILLIN AND SULBACTAM 3 G: 1; 2 INJECTION, POWDER, FOR SOLUTION INTRAMUSCULAR; INTRAVENOUS at 05:15

## 2024-08-20 RX ADMIN — AMLODIPINE BESYLATE 10 MG: 10 TABLET ORAL at 05:17

## 2024-08-20 RX ADMIN — FUROSEMIDE 40 MG: 10 INJECTION INTRAMUSCULAR; INTRAVENOUS at 05:18

## 2024-08-20 RX ADMIN — VIBEGRON 75 MG: 75 TABLET, FILM COATED ORAL at 17:02

## 2024-08-20 RX ADMIN — POTASSIUM CHLORIDE 40 MEQ: 1500 TABLET, EXTENDED RELEASE ORAL at 11:16

## 2024-08-20 RX ADMIN — MAGNESIUM SULFATE HEPTAHYDRATE 2 G: 2 INJECTION, SOLUTION INTRAVENOUS at 06:15

## 2024-08-20 RX ADMIN — AMPICILLIN AND SULBACTAM 3 G: 1; 2 INJECTION, POWDER, FOR SOLUTION INTRAMUSCULAR; INTRAVENOUS at 17:03

## 2024-08-20 RX ADMIN — PRAVASTATIN SODIUM 40 MG: 20 TABLET ORAL at 20:23

## 2024-08-20 RX ADMIN — FENOFIBRATE 134 MG: 134 CAPSULE ORAL at 17:02

## 2024-08-20 RX ADMIN — METOPROLOL SUCCINATE 50 MG: 50 TABLET, EXTENDED RELEASE ORAL at 05:17

## 2024-08-20 RX ADMIN — AMPICILLIN AND SULBACTAM 3 G: 1; 2 INJECTION, POWDER, FOR SOLUTION INTRAMUSCULAR; INTRAVENOUS at 01:30

## 2024-08-20 RX ADMIN — ENOXAPARIN SODIUM 40 MG: 100 INJECTION SUBCUTANEOUS at 17:02

## 2024-08-20 RX ADMIN — AMPICILLIN AND SULBACTAM 3 G: 1; 2 INJECTION, POWDER, FOR SOLUTION INTRAMUSCULAR; INTRAVENOUS at 11:19

## 2024-08-20 RX ADMIN — AZITHROMYCIN 500 MG: 500 INJECTION, POWDER, LYOPHILIZED, FOR SOLUTION INTRAVENOUS at 07:45

## 2024-08-20 RX ADMIN — AMPICILLIN AND SULBACTAM 3 G: 1; 2 INJECTION, POWDER, FOR SOLUTION INTRAMUSCULAR; INTRAVENOUS at 23:25

## 2024-08-20 RX ADMIN — POTASSIUM CHLORIDE 40 MEQ: 1500 TABLET, EXTENDED RELEASE ORAL at 05:17

## 2024-08-20 ASSESSMENT — ENCOUNTER SYMPTOMS
VOMITING: 0
HALLUCINATIONS: 0
BACK PAIN: 0
COUGH: 1
BLURRED VISION: 0
FEVER: 1
ABDOMINAL PAIN: 0
HEADACHES: 0
PALPITATIONS: 0
SHORTNESS OF BREATH: 1
CHILLS: 1
NAUSEA: 0
SEIZURES: 0
DOUBLE VISION: 0

## 2024-08-20 ASSESSMENT — FIBROSIS 4 INDEX: FIB4 SCORE: 2.92

## 2024-08-20 ASSESSMENT — LIFESTYLE VARIABLES: SUBSTANCE_ABUSE: 0

## 2024-08-20 ASSESSMENT — PAIN DESCRIPTION - PAIN TYPE
TYPE: ACUTE PAIN
TYPE: ACUTE PAIN

## 2024-08-20 NOTE — PROGRESS NOTES
NOC HOSPITALIST CROSS COVER    Notified by RN regarding rising troponin levels, and patient CT scan.  CT scan demonstrating extensive airspace opacities most prominent in the right upper and middle lobes consistent with acute pneumonia.  Patient has acute respiratory failure believed to be secondary to acute pulmonary edema.  He has prostate cancer, for which he follows with urologist Dr. Wilkins and  Dr. Grace.  Patient has tachycardia and dyspnea.  Antibiotics were discontinued earlier in the day due to negative procalcitonin, but due to patient's symptoms and worsening tachycardia, along with his ongoing prostate cancer, antibiotics were restarted.  Full respiratory viral panel ordered and negative.  Sputum culture ordered and pending.  Add on Legionella and strep pneumonia given his advanced age.  Electrolytes repleted.      Vitals:    08/20/24 0300   BP: 113/70   Pulse: (!) 105   Resp: 18   Temp:    SpO2: 90%      Discussed with collaborating MD who agrees with plan of care.    -----------------------------------------------------------------------------------------------------------    Electronically signed by:  Marita Rizo, SHEA, RENÉ, DARRYN-BC  Hospitalist Services

## 2024-08-20 NOTE — PROGRESS NOTES
[facility] Medicine Daily Progress Note    Date of Service  8/20/2024    Chief Complaint  Juan Manuel Martinez is a 79 y.o. male admitted 8/19/2024 with shortness of breath    Hospital Course  No notes on file    Interval Problem Update  Patient was seen and examined at bedside.  No acute events overnight. Patient is resting comfortably in bed and in no acute distress. Wife updated at bedside    On 4L supplemental oxygen  Decrease lasix to once daily  IV antibiotics for pneumonia  CT - Extensive airspace opacities most prominent in the right upper lobe, also involving the right middle lobe, right lower lobe, and left upper lobe. Findings most consistent with acute pneumonia.       I have discussed this patient's plan of care and discharge plan at IDT rounds today with Case Management, Nursing, Nursing leadership, and other members of the IDT team.      Code Status  Full Code    Disposition  The patient is not medically cleared for discharge to home or a post-acute facility.      I have placed the appropriate orders for post-discharge needs.    Review of Systems  Review of Systems   Constitutional:  Positive for chills and fever.   HENT:  Positive for congestion.    Eyes:  Negative for blurred vision and double vision.   Respiratory:  Positive for cough and shortness of breath.    Cardiovascular:  Negative for chest pain and palpitations.   Gastrointestinal:  Negative for abdominal pain, nausea and vomiting.   Genitourinary:  Negative for dysuria and urgency.   Musculoskeletal:  Negative for back pain.   Neurological:  Negative for seizures and headaches.   Psychiatric/Behavioral:  Negative for hallucinations and substance abuse.         Physical Exam  Temp:  [36.3 °C (97.3 °F)-36.8 °C (98.2 °F)] 36.3 °C (97.3 °F)  Pulse:  [] 82  Resp:  [18] 18  BP: ()/(62-74) 101/66  SpO2:  [90 %-98 %] 94 %    Physical Exam  Vitals reviewed.   Constitutional:       Appearance: He is ill-appearing.   HENT:      Head:  Normocephalic.      Right Ear: External ear normal.      Left Ear: External ear normal.      Nose: No congestion.   Eyes:      General: No scleral icterus.  Pulmonary:      Breath sounds: Rhonchi present.      Comments: Crackles in right lung  Abdominal:      Tenderness: There is no abdominal tenderness. There is no guarding or rebound.   Musculoskeletal:         General: Swelling present.   Skin:     Coloration: Skin is not jaundiced.      Findings: No bruising.   Neurological:      General: No focal deficit present.      Mental Status: He is alert and oriented to person, place, and time.      Motor: No weakness.   Psychiatric:         Mood and Affect: Mood normal.         Behavior: Behavior normal.         Fluids    Intake/Output Summary (Last 24 hours) at 8/20/2024 1614  Last data filed at 8/20/2024 0712  Gross per 24 hour   Intake 240 ml   Output 1300 ml   Net -1060 ml       Laboratory  Recent Labs     08/19/24  0946 08/20/24  0222   WBC 14.8* 11.9*   RBC 4.66* 3.98*   HEMOGLOBIN 14.1 12.2*   HEMATOCRIT 42.5 36.3*   MCV 91.2 91.2   MCH 30.3 30.7   MCHC 33.2 33.6   RDW 50.7* 50.3*   PLATELETCT 175 153*   MPV 11.6 11.7     Recent Labs     08/19/24  0946 08/20/24  0222   SODIUM 140 143   POTASSIUM 4.0 2.9*   CHLORIDE 104 102   CO2 20 24   GLUCOSE 194* 142*   BUN 32* 34*   CREATININE 1.10 1.06   CALCIUM 10.1 8.9                   Imaging  CT-CHEST (THORAX) W/O   Final Result      1.  Coronary calcification.   2.  Aortic valve calcification and mitral annulus calcification.   3.  Multiple scattered small mediastinal lymph nodes. The largest mediastinal node is a precarinal node favoring the right which measures 14 mm in short axis. These are nonspecific but considering the pulmonary infiltrates these may represent reactive    nodes.   4.  Extensive airspace opacities most prominent in the right upper lobe, also involving the right middle lobe, right lower lobe, and left upper lobe. Findings most consistent with acute  pneumonia.   5.  Small bilateral pleural effusions, right greater than left.   6.  Calcified old granulomatous disease in the liver.   7.  Cholelithiasis. Calcified gallstone at the gallbladder neck. Porcelain gallbladder.      Fleischner Society pulmonary nodule recommendations:   Not Applicable         EC-ECHOCARDIOGRAM COMPLETE W/O CONT   Final Result      DX-CHEST-PORTABLE (1 VIEW)   Final Result      1.  RIGHT midlung pneumonia. Underlying mass not excluded. Recommend attention on follow-up.   2.  Cardiomegaly           Assessment/Plan  * Acute respiratory failure with hypoxia (HCC)- (present on admission)  Assessment & Plan  Seemingly due to pulmonary edema / infiltrate of unclear etiology - see separate plan  Respiratory PCR negative for COV/FLU/RSV  RT consulted  Empiric IV diuresis    Aortic stenosis  Assessment & Plan  Heavily calcified aortic valve leaflets. Severe aortic valve stenosis.   Aortic valve area calculated from the continuity equation is 0.5cm2.     IV lasix - QD    Pneumonia  Assessment & Plan  CT chest - Extensive airspace opacities most prominent in the right upper lobe, also involving the right middle lobe, right lower lobe, and left upper lobe. Findings most consistent with acute pneumonia.     IV antibiotics    Prostate cancer (HCC)- (present on admission)  Assessment & Plan  Follows with urologists Dr. Wilkins and Dr. Izquierdo  Initiation of XRT deferred to outpatient while treating acute cardiopulmonary illness  Continue mybertiq    Nonrheumatic mitral valve regurgitation- (present on admission)  Assessment & Plan  Clinically apparent by systolic murmur radiating to axilla  TTE ordered for severity and acute valve prolapse / rupture  Diuresis per separate plan    High anion gap metabolic acidosis- (present on admission)  Assessment & Plan  Due to lactic acidosis  Empiric diuresis due to AHRF and acute pulmonary edema  Repeat lactic and AM BMP    Elevated troponin- (present on  admission)  Assessment & Plan  Unclear significance, possibly demand ischemia due to acute pulmonary edema  EKG with ?ST depressions V4-V6 concerning for evolving infarct  Repeat q4h to peak  Telemetry    Type 2 diabetes mellitus with hyperglycemia, without long-term current use of insulin (HCC)- (present on admission)  Assessment & Plan  Holding metformin on admission  Continue pravastatin  No indication for strict BG control - POCT/ SSI deferred    Primary hypertension- (present on admission)  Assessment & Plan  Continue amlodipine  Holding HCTZ-Lisinopril in setting of loop diuretic for pulmonary edema         VTE prophylaxis:    enoxaparin ppx      I have performed a physical exam and reviewed and updated ROS and Plan today (8/20/2024). In review of yesterday's note (8/19/2024), there are no changes except as documented above.    Greater than 51 minutes spent prepping to see patient (e.g. review of tests) obtaining and/or reviewing separately obtained history. Performing a medically appropriate examination and/ evaluation.  Counseling and educating the patient/family/caregiver.  Ordering medications, tests, or procedures.  Referring and communicating with other health care professionals.  Documenting clinical information in EPIC.  Independently interpreting results and communicating results to patient/family/caregiver.  Care coordination.

## 2024-08-20 NOTE — CARE PLAN
The patient is Watcher - Medium risk of patient condition declining or worsening         Progress made toward(s) clinical / shift goals:        Problem: Care Map:  Day 1 Optimal Outcome for the Heart Failure Patient  Goal: Day 1:  Optimal Care of the heart failure patient  Description: Target End Date:  end of day 1  Outcome: Progressing  Intervention: Provide and explain the Stoplight Tool  Note: Discussed S/S, discussed volume status   Intervention: Assess and document 2 hour post diuretic output  Note: Monitoring I/os  Intervention: Document intake and output per shift.  Communicate with care team if volume status is improving, stalled or worseing.  Note: Monitoring I/os      Problem: Fall Risk  Goal: Patient will remain free from falls  Description: Target End Date:  Prior to discharge or change in level of care    Document interventions on the Orozco Alfredo Fall Risk Assessment    1.  Assess for fall risk factors  2.  Implement fall precautions  Outcome: Progressing  Note: Fall precautions in place

## 2024-08-20 NOTE — ASSESSMENT & PLAN NOTE
Heavily calcified aortic valve leaflets. Severe aortic valve stenosis.   Aortic valve area calculated from the continuity equation is 0.5cm2.   Avoid overdiuresis due to preload dependence  Consult to cardiology regarding his AS - will connect with valve team for discharge

## 2024-08-20 NOTE — PROGRESS NOTES
Bedside report received from off going RN/tech: Anca assumed care of patient.     Fall Risk Score: MODERATE RISK  Fall risk interventions in place: Provide patient/family education based on risk assessment, Educate patient/family to call staff for assistance when getting out of bed, Place fall precaution signage outside patient door, Place patient in room close to nursing station, Utilize bed/chair fall alarm, and Bed alarm connected correctly  Bed type: Regular (Jerome Score less than 17 interventions in place)  Patient on cardiac monitor: Yes  IVF/IV medications: Not Applicable   Oxygen: How many liters 4L and Traced the line to wall oxygen  Bedside sitter: Not Applicable   Isolation: Not applicable

## 2024-08-20 NOTE — ASSESSMENT & PLAN NOTE
CT chest - Extensive airspace opacities most prominent in the right upper lobe, also involving the right middle lobe, right lower lobe, and left upper lobe. Findings most consistent with acute pneumonia.     IV antibiotics descalated to Augmentin

## 2024-08-20 NOTE — PROGRESS NOTES
Bedside report received from off going RN/tech: Vik, assumed care of patient.     Fall Risk Score: MODERATE RISK  Fall risk interventions in place: Place yellow fall risk ID band on patient, Provide patient/family education based on risk assessment, Educate patient/family to call staff for assistance when getting out of bed, Place fall precaution signage outside patient door, Place patient in room close to nursing station, and Utilize bed/chair fall alarm  Bed type: Regular (Jerome Score less than 17 interventions in place)  Patient on cardiac monitor: Yes  IVF/IV medications: Not Applicable   Oxygen: How many liters 4L  Bedside sitter: Not Applicable   Isolation: Not applicable

## 2024-08-20 NOTE — CARE PLAN
The patient is Stable - Low risk of patient condition declining or worsening         Progress made toward(s) clinical / shift goals:    Problem: Care Map:  Day 1 Optimal Outcome for the Heart Failure Patient  Goal: Day 1:  Optimal Care of the heart failure patient  Outcome: Progressing       Patient is not progressing towards the following goals:

## 2024-08-21 PROBLEM — I35.0 SEVERE AORTIC STENOSIS: Chronic | Status: ACTIVE | Noted: 2024-08-20

## 2024-08-21 PROBLEM — I05.0 SEVERE MITRAL VALVE STENOSIS: Chronic | Status: ACTIVE | Noted: 2024-08-21

## 2024-08-21 LAB
ALBUMIN SERPL BCP-MCNC: 3.4 G/DL (ref 3.2–4.9)
BACTERIA UR CULT: NORMAL
BASOPHILS # BLD AUTO: 0.4 % (ref 0–1.8)
BASOPHILS # BLD: 0.04 K/UL (ref 0–0.12)
BUN SERPL-MCNC: 36 MG/DL (ref 8–22)
CALCIUM ALBUM COR SERPL-MCNC: 9.5 MG/DL (ref 8.5–10.5)
CALCIUM SERPL-MCNC: 9 MG/DL (ref 8.5–10.5)
CHLORIDE SERPL-SCNC: 101 MMOL/L (ref 96–112)
CO2 SERPL-SCNC: 24 MMOL/L (ref 20–33)
CREAT SERPL-MCNC: 0.97 MG/DL (ref 0.5–1.4)
EOSINOPHIL # BLD AUTO: 0.12 K/UL (ref 0–0.51)
EOSINOPHIL NFR BLD: 1.1 % (ref 0–6.9)
ERYTHROCYTE [DISTWIDTH] IN BLOOD BY AUTOMATED COUNT: 48.6 FL (ref 35.9–50)
GFR SERPLBLD CREATININE-BSD FMLA CKD-EPI: 79 ML/MIN/1.73 M 2
GLUCOSE SERPL-MCNC: 138 MG/DL (ref 65–99)
HCT VFR BLD AUTO: 37 % (ref 42–52)
HGB BLD-MCNC: 12.7 G/DL (ref 14–18)
IMM GRANULOCYTES # BLD AUTO: 0.06 K/UL (ref 0–0.11)
IMM GRANULOCYTES NFR BLD AUTO: 0.5 % (ref 0–0.9)
LYMPHOCYTES # BLD AUTO: 1.24 K/UL (ref 1–4.8)
LYMPHOCYTES NFR BLD: 11.2 % (ref 22–41)
MAGNESIUM SERPL-MCNC: 2 MG/DL (ref 1.5–2.5)
MCH RBC QN AUTO: 31.1 PG (ref 27–33)
MCHC RBC AUTO-ENTMCNC: 34.3 G/DL (ref 32.3–36.5)
MCV RBC AUTO: 90.7 FL (ref 81.4–97.8)
MONOCYTES # BLD AUTO: 1.39 K/UL (ref 0–0.85)
MONOCYTES NFR BLD AUTO: 12.5 % (ref 0–13.4)
NEUTROPHILS # BLD AUTO: 8.25 K/UL (ref 1.82–7.42)
NEUTROPHILS NFR BLD: 74.3 % (ref 44–72)
NRBC # BLD AUTO: 0 K/UL
NRBC BLD-RTO: 0 /100 WBC (ref 0–0.2)
PHOSPHATE SERPL-MCNC: 2.5 MG/DL (ref 2.5–4.5)
PLATELET # BLD AUTO: 163 K/UL (ref 164–446)
PMV BLD AUTO: 11.5 FL (ref 9–12.9)
POTASSIUM SERPL-SCNC: 3.5 MMOL/L (ref 3.6–5.5)
RBC # BLD AUTO: 4.08 M/UL (ref 4.7–6.1)
SIGNIFICANT IND 70042: NORMAL
SITE SITE: NORMAL
SODIUM SERPL-SCNC: 140 MMOL/L (ref 135–145)
SOURCE SOURCE: NORMAL
WBC # BLD AUTO: 11.1 K/UL (ref 4.8–10.8)

## 2024-08-21 PROCEDURE — 700102 HCHG RX REV CODE 250 W/ 637 OVERRIDE(OP): Performed by: STUDENT IN AN ORGANIZED HEALTH CARE EDUCATION/TRAINING PROGRAM

## 2024-08-21 PROCEDURE — 700111 HCHG RX REV CODE 636 W/ 250 OVERRIDE (IP): Mod: JZ

## 2024-08-21 PROCEDURE — A9270 NON-COVERED ITEM OR SERVICE: HCPCS | Performed by: INTERNAL MEDICINE

## 2024-08-21 PROCEDURE — 80069 RENAL FUNCTION PANEL: CPT

## 2024-08-21 PROCEDURE — 87899 AGENT NOS ASSAY W/OPTIC: CPT

## 2024-08-21 PROCEDURE — 99222 1ST HOSP IP/OBS MODERATE 55: CPT | Performed by: INTERNAL MEDICINE

## 2024-08-21 PROCEDURE — 99232 SBSQ HOSP IP/OBS MODERATE 35: CPT | Performed by: INTERNAL MEDICINE

## 2024-08-21 PROCEDURE — 87449 NOS EACH ORGANISM AG IA: CPT

## 2024-08-21 PROCEDURE — 83735 ASSAY OF MAGNESIUM: CPT

## 2024-08-21 PROCEDURE — 700102 HCHG RX REV CODE 250 W/ 637 OVERRIDE(OP): Performed by: INTERNAL MEDICINE

## 2024-08-21 PROCEDURE — 700111 HCHG RX REV CODE 636 W/ 250 OVERRIDE (IP): Mod: JZ | Performed by: STUDENT IN AN ORGANIZED HEALTH CARE EDUCATION/TRAINING PROGRAM

## 2024-08-21 PROCEDURE — A9270 NON-COVERED ITEM OR SERVICE: HCPCS | Performed by: STUDENT IN AN ORGANIZED HEALTH CARE EDUCATION/TRAINING PROGRAM

## 2024-08-21 PROCEDURE — 700105 HCHG RX REV CODE 258

## 2024-08-21 PROCEDURE — 770020 HCHG ROOM/CARE - TELE (206)

## 2024-08-21 PROCEDURE — 700111 HCHG RX REV CODE 636 W/ 250 OVERRIDE (IP): Performed by: INTERNAL MEDICINE

## 2024-08-21 PROCEDURE — 85025 COMPLETE CBC W/AUTO DIFF WBC: CPT

## 2024-08-21 RX ORDER — POLYETHYLENE GLYCOL 3350 17 G/17G
1 POWDER, FOR SOLUTION ORAL
Status: DISCONTINUED | OUTPATIENT
Start: 2024-08-21 | End: 2024-08-22 | Stop reason: HOSPADM

## 2024-08-21 RX ORDER — AMOXICILLIN 250 MG
2 CAPSULE ORAL EVERY EVENING
Status: DISCONTINUED | OUTPATIENT
Start: 2024-08-21 | End: 2024-08-22 | Stop reason: HOSPADM

## 2024-08-21 RX ORDER — POTASSIUM CHLORIDE 1500 MG/1
40 TABLET, EXTENDED RELEASE ORAL ONCE
Status: COMPLETED | OUTPATIENT
Start: 2024-08-21 | End: 2024-08-21

## 2024-08-21 RX ADMIN — FUROSEMIDE 40 MG: 10 INJECTION INTRAMUSCULAR; INTRAVENOUS at 06:00

## 2024-08-21 RX ADMIN — FENOFIBRATE 134 MG: 134 CAPSULE ORAL at 17:28

## 2024-08-21 RX ADMIN — AMLODIPINE BESYLATE 10 MG: 10 TABLET ORAL at 06:35

## 2024-08-21 RX ADMIN — VIBEGRON 75 MG: 75 TABLET, FILM COATED ORAL at 17:28

## 2024-08-21 RX ADMIN — METOPROLOL SUCCINATE 50 MG: 50 TABLET, EXTENDED RELEASE ORAL at 05:42

## 2024-08-21 RX ADMIN — POTASSIUM CHLORIDE 40 MEQ: 1500 TABLET, EXTENDED RELEASE ORAL at 08:32

## 2024-08-21 RX ADMIN — ENOXAPARIN SODIUM 40 MG: 100 INJECTION SUBCUTANEOUS at 17:28

## 2024-08-21 RX ADMIN — AMOXICILLIN AND CLAVULANATE POTASSIUM 1 TABLET: 875; 125 TABLET, FILM COATED ORAL at 17:27

## 2024-08-21 RX ADMIN — PRAVASTATIN SODIUM 40 MG: 20 TABLET ORAL at 20:17

## 2024-08-21 RX ADMIN — AMPICILLIN AND SULBACTAM 3 G: 1; 2 INJECTION, POWDER, FOR SOLUTION INTRAMUSCULAR; INTRAVENOUS at 05:51

## 2024-08-21 RX ADMIN — AZITHROMYCIN DIHYDRATE 500 MG: 250 TABLET ORAL at 05:42

## 2024-08-21 ASSESSMENT — ENCOUNTER SYMPTOMS
CHILLS: 0
ABDOMINAL PAIN: 0
SEIZURES: 0
COUGH: 0
PALPITATIONS: 0
BACK PAIN: 0
BLURRED VISION: 0
NAUSEA: 0
HEADACHES: 0
DOUBLE VISION: 0
SHORTNESS OF BREATH: 1
HALLUCINATIONS: 0
FEVER: 0
VOMITING: 0

## 2024-08-21 ASSESSMENT — LIFESTYLE VARIABLES: SUBSTANCE_ABUSE: 0

## 2024-08-21 ASSESSMENT — PAIN DESCRIPTION - PAIN TYPE: TYPE: ACUTE PAIN

## 2024-08-21 ASSESSMENT — FIBROSIS 4 INDEX: FIB4 SCORE: 2.74

## 2024-08-21 NOTE — PROGRESS NOTES
[facility] Medicine Daily Progress Note    Date of Service  8/21/2024    Chief Complaint  Juan Manuel Martinez is a 79 y.o. male admitted 8/19/2024 with shortness of breath    Hospital Course  No notes on file    Interval Problem Update  Patient was seen and examined at bedside.  No acute events overnight. Patient is resting comfortably in bed and in no acute distress.     On 4L supplemental oxygen  Lasix  Augmentin for pneumonia  CT - Extensive airspace opacities most prominent in the right upper lobe, also involving the right middle lobe, right lower lobe, and left upper lobe. Findings most consistent with acute pneumonia.   WBC 11.1  Consult to cardiology regarding his AS - will connect with valve team for discharge    I have discussed this patient's plan of care and discharge plan at IDT rounds today with Case Management, Nursing, Nursing leadership, and other members of the IDT team.      Code Status  Full Code    Disposition  The patient is not medically cleared for discharge to home or a post-acute facility.      I have placed the appropriate orders for post-discharge needs.    Review of Systems  Review of Systems   Constitutional:  Negative for chills and fever.   HENT:  Negative for congestion.    Eyes:  Negative for blurred vision and double vision.   Respiratory:  Positive for shortness of breath. Negative for cough.    Cardiovascular:  Negative for chest pain and palpitations.   Gastrointestinal:  Negative for abdominal pain, nausea and vomiting.   Genitourinary:  Negative for dysuria and urgency.   Musculoskeletal:  Negative for back pain.   Neurological:  Negative for seizures and headaches.   Psychiatric/Behavioral:  Negative for hallucinations and substance abuse.         Physical Exam  Temp:  [36.3 °C (97.4 °F)-36.9 °C (98.4 °F)] 36.9 °C (98.4 °F)  Pulse:  [] 101  Resp:  [18] 18  BP: ()/(63-85) 107/67  SpO2:  [90 %-96 %] 90 %    Physical Exam  Vitals reviewed.   Constitutional:        Appearance: He is ill-appearing.   HENT:      Head: Normocephalic.      Right Ear: External ear normal.      Left Ear: External ear normal.      Nose: No congestion.   Eyes:      General: No scleral icterus.  Cardiovascular:      Heart sounds: Murmur heard.   Pulmonary:      Comments: Crackles in right lung  Abdominal:      Tenderness: There is no abdominal tenderness. There is no guarding or rebound.   Musculoskeletal:         General: Swelling present.   Skin:     Coloration: Skin is not jaundiced.      Findings: No bruising.   Neurological:      General: No focal deficit present.      Mental Status: He is alert and oriented to person, place, and time.      Motor: No weakness.   Psychiatric:         Mood and Affect: Mood normal.         Behavior: Behavior normal.         Fluids    Intake/Output Summary (Last 24 hours) at 8/21/2024 1651  Last data filed at 8/21/2024 1300  Gross per 24 hour   Intake 480 ml   Output 1575 ml   Net -1095 ml       Laboratory  Recent Labs     08/19/24  0946 08/20/24 0222 08/21/24  0119   WBC 14.8* 11.9* 11.1*   RBC 4.66* 3.98* 4.08*   HEMOGLOBIN 14.1 12.2* 12.7*   HEMATOCRIT 42.5 36.3* 37.0*   MCV 91.2 91.2 90.7   MCH 30.3 30.7 31.1   MCHC 33.2 33.6 34.3   RDW 50.7* 50.3* 48.6   PLATELETCT 175 153* 163*   MPV 11.6 11.7 11.5     Recent Labs     08/19/24  0946 08/20/24 0222 08/21/24  0119   SODIUM 140 143 140   POTASSIUM 4.0 2.9* 3.5*   CHLORIDE 104 102 101   CO2 20 24 24   GLUCOSE 194* 142* 138*   BUN 32* 34* 36*   CREATININE 1.10 1.06 0.97   CALCIUM 10.1 8.9 9.0                   Imaging  CT-CHEST (THORAX) W/O   Final Result      1.  Coronary calcification.   2.  Aortic valve calcification and mitral annulus calcification.   3.  Multiple scattered small mediastinal lymph nodes. The largest mediastinal node is a precarinal node favoring the right which measures 14 mm in short axis. These are nonspecific but considering the pulmonary infiltrates these may represent reactive    nodes.    4.  Extensive airspace opacities most prominent in the right upper lobe, also involving the right middle lobe, right lower lobe, and left upper lobe. Findings most consistent with acute pneumonia.   5.  Small bilateral pleural effusions, right greater than left.   6.  Calcified old granulomatous disease in the liver.   7.  Cholelithiasis. Calcified gallstone at the gallbladder neck. Porcelain gallbladder.      Fleischner Society pulmonary nodule recommendations:   Not Applicable         EC-ECHOCARDIOGRAM COMPLETE W/O CONT   Final Result      DX-CHEST-PORTABLE (1 VIEW)   Final Result      1.  RIGHT midlung pneumonia. Underlying mass not excluded. Recommend attention on follow-up.   2.  Cardiomegaly           Assessment/Plan  * Acute respiratory failure with hypoxia (HCC)- (present on admission)  Assessment & Plan  Seemingly due to pulmonary edema / infiltrate of unclear etiology - see separate plan  Respiratory PCR negative for COV/FLU/RSV  RT consulted  Empiric IV diuresis    Severe aortic stenosis- (present on admission)  Assessment & Plan  Heavily calcified aortic valve leaflets. Severe aortic valve stenosis.   Aortic valve area calculated from the continuity equation is 0.5cm2.   Avoid overdiuresis due to preload dependence  Consult to cardiology regarding his AS - will connect with valve team for discharge    Pneumonia  Assessment & Plan  CT chest - Extensive airspace opacities most prominent in the right upper lobe, also involving the right middle lobe, right lower lobe, and left upper lobe. Findings most consistent with acute pneumonia.     IV antibiotics descalated to Augmentin    Prostate cancer (HCC)- (present on admission)  Assessment & Plan  Follows with urologists Dr. Wilkins and Dr. Izquierdo  Initiation of XRT deferred to outpatient while treating acute cardiopulmonary illness  Continue mybertiq    Nonrheumatic mitral valve regurgitation- (present on admission)  Assessment & Plan  Clinically apparent  by systolic murmur radiating to axilla  TTE ordered for severity and acute valve prolapse / rupture  Diuresis per separate plan    High anion gap metabolic acidosis- (present on admission)  Assessment & Plan  Due to lactic acidosis  Empiric diuresis due to AHRF and acute pulmonary edema  Repeat lactic and AM BMP    Elevated troponin- (present on admission)  Assessment & Plan  Unclear significance, possibly demand ischemia due to acute pulmonary edema  EKG with ?ST depressions V4-V6 concerning for evolving infarct  Repeat q4h to peak  Telemetry    Type 2 diabetes mellitus with hyperglycemia, without long-term current use of insulin (HCC)- (present on admission)  Assessment & Plan  Holding metformin on admission  Continue pravastatin  No indication for strict BG control - POCT/ SSI deferred    Primary hypertension- (present on admission)  Assessment & Plan  Continue amlodipine  Holding HCTZ-Lisinopril in setting of loop diuretic for pulmonary edema         VTE prophylaxis:    enoxaparin ppx      I have performed a physical exam and reviewed and updated ROS and Plan today (8/21/2024). In review of yesterday's note (8/20/2024), there are no changes except as documented above.    Greater than 44 minutes spent prepping to see patient (e.g. review of tests) obtaining and/or reviewing separately obtained history. Performing a medically appropriate examination and/ evaluation.  Counseling and educating the patient/family/caregiver.  Ordering medications, tests, or procedures.  Referring and communicating with other health care professionals.  Documenting clinical information in EPIC.  Independently interpreting results and communicating results to patient/family/caregiver.  Care coordination.

## 2024-08-21 NOTE — CARE PLAN
The patient is Stable - Low risk of patient condition declining or worsening    Shift Goals  Clinical Goals: weaning off 02 mornitor I&O  Patient Goals: comfort and rest    Progress made toward(s) clinical / shift goals:    Problem: Knowledge Deficit - Standard  Goal: Patient and family/care givers will demonstrate understanding of plan of care, disease process/condition, diagnostic tests and medications  Outcome: Progressing  Note: Discussed plan of care with patient, answered all current questions.   To continue antibiotic for pneumonia and diuretics for AS/ pulmonary edema       Problem: Fall Risk  Goal: Patient will remain free from falls  Outcome: Progressing

## 2024-08-21 NOTE — PROGRESS NOTES
Monitor Summary  Rhythm: SR  Rate: 80-95  Ectopy: (f)pvc, big, (o)coup  .16 / .10 / .38

## 2024-08-21 NOTE — PROGRESS NOTES
Bedside report received from day shift RN. Assumed care. Pt is A&O . Pt is in SR/ST . Pt denies pain at the time. Pt was updated on plan of care. Pt has call light, personal belonging, and bedside table within reach. Bed is in the lowest position and bed alarm is on.     Bedside report received from off going RN/tech: Elieser JACOBO, assumed care of patient.     Fall Risk Score: MODERATE RISK  Fall risk interventions in place: Provide patient/family education based on risk assessment, Educate patient/family to call staff for assistance when getting out of bed, Place fall precaution signage outside patient door, and Place patient in room close to nursing station  Bed type: Regular (Jerome Score less than 17 interventions in place)  Patient on cardiac monitor: Yes  IVF/IV medications: Not Applicable   Oxygen: How many liters 5L  Bedside sitter: Not Applicable   Isolation: Not applicable

## 2024-08-21 NOTE — CARE PLAN
Problem: Knowledge Deficit - Standard  Goal: Patient and family/care givers will demonstrate understanding of plan of care, disease process/condition, diagnostic tests and medications  Outcome: Progressing  Note: Discuss and review POC with patient/family. Re-educate as needed.       Problem: Fall Risk  Goal: Patient will remain free from falls  Outcome: Progressing  Note: Treaded socks and bed/strip alarm on, side rails up x 3. Call light within reach. Pt educated to call for assistance. Reinforce as needed.       The patient is Stable - Low risk of patient condition declining or worsening    Shift Goals: Home oxygen evaluation   Clinical Goals: Titrate oxygen  Patient Goals: Rest    Progress made toward(s) clinical / shift goals: Patient on room air     Patient is not progressing towards the following goals: Monitor respiratory status

## 2024-08-21 NOTE — CONSULTS
Reason for Consult:  Asked by Dr John Jones D.O. to see this patient with  Severe aortic stenosis  Patient's PCP: Francois Penaloza M.D.    CC:   Chief Complaint   Patient presents with    Shortness of Breath           Cough    Chills       HPI: This is a pleasant 79-year-old gentleman with history of hypertension diabetes valvular disease prostate cancer status post prostatectomy who presents with pneumonia community-acquired and had elevated BNP and troponin he had an echocardiogram which shows severe aortic stenosis aortic valve area is calculated 0.5 also has mitral and her calcification and coronary calcification.  Remotely in 2013 Dr. Arroyo had heard a murmur and he had an echocardiogram he has a report with him from [facility] which shows mild aortic stenosis.    He had prostatectomy remotely in the 90s but recently had mildly elevated PSA and has had a PET CT scan with close monitoring and urology    His wife had a TAVR with Dr. Mittal    Medications / Drug list prior to admission:  No current facility-administered medications on file prior to encounter.     Current Outpatient Medications on File Prior to Encounter   Medication Sig Dispense Refill    amLODIPine (NORVASC) 10 MG Tab Take 10 mg by mouth every day.      fenofibrate (TRIGLIDE) 160 MG tablet Take 160 mg by mouth every day.      losartan-hydrochlorothiazide (HYZAAR) 100-12.5 MG per tablet Take 1 Tablet by mouth every day.      metformin (GLUCOPHAGE) 1000 MG tablet Take 1,000 mg by mouth 2 times a day with meals.      metoprolol SR (TOPROL XL) 50 MG TABLET SR 24 HR Take 50 mg by mouth every day.      MYRBETRIQ 50 MG TABLET SR 24 HR Take 50 mg by mouth every day.      pravastatin (PRAVACHOL) 40 MG tablet Take 40 mg by mouth every evening.      ibuprofen (MOTRIN) 200 MG Tab Take 400 mg by mouth every 8 hours as needed for Mild Pain.         Current list of administered Medications:    Current Facility-Administered Medications:     senna-docusate  (Pericolace Or Senokot S) 8.6-50 MG per tablet 2 Tablet, 2 Tablet, Oral, Q EVENING **AND** polyethylene glycol/lytes (Miralax) Packet 1 Packet, 1 Packet, Oral, QDAY PRN, John Jones D.O.    amoxicillin-clavulanate (Augmentin) 875-125 MG per tablet 1 Tablet, 1 Tablet, Oral, Q12HRS, John Jones D.O.    furosemide (Lasix) injection 40 mg, 40 mg, Intravenous, Q DAY, John Jones D.O., 40 mg at 08/21/24 0600    amLODIPine (Norvasc) tablet 10 mg, 10 mg, Oral, DAILY, Vern Mitchell M.D., 10 mg at 08/21/24 0635    metoprolol SR (Toprol XL) tablet 50 mg, 50 mg, Oral, DAILY, Vern Mitchell M.D., 50 mg at 08/21/24 0542    pravastatin (Pravachol) tablet 40 mg, 40 mg, Oral, Nightly, Vern Mitchell M.D., 40 mg at 08/20/24 2023    enoxaparin (Lovenox) inj 40 mg, 40 mg, Subcutaneous, DAILY AT 1800, Vern Mitchell M.D., 40 mg at 08/20/24 1702    acetaminophen (Tylenol) tablet 1,000 mg, 1,000 mg, Oral, Q6HRS PRN, Vern Mitchell M.D.    ondansetron (Zofran) syringe/vial injection 4 mg, 4 mg, Intravenous, Q4HRS PRN, Vern Mitchell M.D.    ondansetron (Zofran ODT) dispertab 4 mg, 4 mg, Oral, Q4HRS PRN, Vern Mitchell M.D.    fenofibrate micronized (Lofibra) capsule 134 mg, 134 mg, Oral, Q EVENING, Vern Mitchell M.D., 134 mg at 08/20/24 1702    vibegron (Gemtesa) tablet 75 mg, 75 mg, Oral, Q EVENING, Vern Mitchell M.D., 75 mg at 08/20/24 1702    Past Medical History:   Diagnosis Date    Diabetes (HCC)     Hypertension     Prostate cancer (HCC)     Prostate cancer (HCC)        Past Surgical History:   Procedure Laterality Date    PROSTATECTOMY, RADIAL         Family History   Problem Relation Age of Onset    Hypertension Mother     Breast Cancer Mother      Patient family history was personally reviewed, no pertinent family history to current presentation    Social History     Tobacco Use    Smoking status: Never    Smokeless tobacco: Never   Substance Use Topics    Alcohol use: Yes     Alcohol/week: 2.4 - 3.0 oz     Types: 4  - 5 Cans of beer per week    Drug use: Yes     Types: Inhaled     Comment: MJ some days       ALLERGIES:  No Known Allergies    Review of systems:  A complete review of symptoms was reviewed with patient. This is reviewed in H&P and PMH. ALL OTHERS reviewed and negative    Physical exam:  Patient Vitals for the past 24 hrs:   BP Temp Temp src Pulse Resp SpO2 Weight   08/21/24 0705 119/75 36.3 °C (97.4 °F) Temporal 93 18 95 % --   08/21/24 0633 100/70 -- -- 88 -- -- --   08/21/24 0536 92/63 -- -- 91 -- -- --   08/21/24 0520 103/72 36.6 °C (97.9 °F) Temporal 87 18 96 % 80.7 kg (177 lb 14.6 oz)   08/20/24 2340 104/68 -- Temporal 90 18 94 % --   08/20/24 1900 96/66 -- Temporal (!) 101 18 93 % --   08/20/24 1536 101/66 36.3 °C (97.3 °F) Temporal 82 18 94 % --     General: No acute distress.   EYES: no jaundice  HEENT: OP clear   Neck:  No JVD.   CVS:  [  RRR.  Systolic ejection murmur  Resp: Normal respiratory effort,   Abdomen: ND,  Skin: Grossly nothing acute no obvious rashes  Neurological: Alert, Moves all extremities  Extremities:   [  no edema. No cyanosis.       Data:  Laboratory studies personally reviewed by me:  Recent Results (from the past 24 hour(s))   CBC WITH DIFFERENTIAL    Collection Time: 08/21/24  1:19 AM   Result Value Ref Range    WBC 11.1 (H) 4.8 - 10.8 K/uL    RBC 4.08 (L) 4.70 - 6.10 M/uL    Hemoglobin 12.7 (L) 14.0 - 18.0 g/dL    Hematocrit 37.0 (L) 42.0 - 52.0 %    MCV 90.7 81.4 - 97.8 fL    MCH 31.1 27.0 - 33.0 pg    MCHC 34.3 32.3 - 36.5 g/dL    RDW 48.6 35.9 - 50.0 fL    Platelet Count 163 (L) 164 - 446 K/uL    MPV 11.5 9.0 - 12.9 fL    Neutrophils-Polys 74.30 (H) 44.00 - 72.00 %    Lymphocytes 11.20 (L) 22.00 - 41.00 %    Monocytes 12.50 0.00 - 13.40 %    Eosinophils 1.10 0.00 - 6.90 %    Basophils 0.40 0.00 - 1.80 %    Immature Granulocytes 0.50 0.00 - 0.90 %    Nucleated RBC 0.00 0.00 - 0.20 /100 WBC    Neutrophils (Absolute) 8.25 (H) 1.82 - 7.42 K/uL    Lymphs (Absolute) 1.24 1.00 - 4.80  K/uL    Monos (Absolute) 1.39 (H) 0.00 - 0.85 K/uL    Eos (Absolute) 0.12 0.00 - 0.51 K/uL    Baso (Absolute) 0.04 0.00 - 0.12 K/uL    Immature Granulocytes (abs) 0.06 0.00 - 0.11 K/uL    NRBC (Absolute) 0.00 K/uL   MAGNESIUM    Collection Time: 08/21/24  1:19 AM   Result Value Ref Range    Magnesium 2.0 1.5 - 2.5 mg/dL   Renal Function Panel    Collection Time: 08/21/24  1:19 AM   Result Value Ref Range    Sodium 140 135 - 145 mmol/L    Potassium 3.5 (L) 3.6 - 5.5 mmol/L    Chloride 101 96 - 112 mmol/L    Co2 24 20 - 33 mmol/L    Glucose 138 (H) 65 - 99 mg/dL    Creatinine 0.97 0.50 - 1.40 mg/dL    Bun 36 (H) 8 - 22 mg/dL    Calcium 9.0 8.5 - 10.5 mg/dL    Correct Calcium 9.5 8.5 - 10.5 mg/dL    Phosphorus 2.5 2.5 - 4.5 mg/dL    Albumin 3.4 3.2 - 4.9 g/dL   ESTIMATED GFR    Collection Time: 08/21/24  1:19 AM   Result Value Ref Range    GFR (CKD-EPI) 79 >60 mL/min/1.73 m 2       Imaging:  CT-CHEST (THORAX) W/O   Final Result      1.  Coronary calcification.   2.  Aortic valve calcification and mitral annulus calcification.   3.  Multiple scattered small mediastinal lymph nodes. The largest mediastinal node is a precarinal node favoring the right which measures 14 mm in short axis. These are nonspecific but considering the pulmonary infiltrates these may represent reactive    nodes.   4.  Extensive airspace opacities most prominent in the right upper lobe, also involving the right middle lobe, right lower lobe, and left upper lobe. Findings most consistent with acute pneumonia.   5.  Small bilateral pleural effusions, right greater than left.   6.  Calcified old granulomatous disease in the liver.   7.  Cholelithiasis. Calcified gallstone at the gallbladder neck. Porcelain gallbladder.      Fleischner Society pulmonary nodule recommendations:   Not Applicable         EC-ECHOCARDIOGRAM COMPLETE W/O CONT   Final Result      DX-CHEST-PORTABLE (1 VIEW)   Final Result      1.  RIGHT midlung pneumonia. Underlying mass not  excluded. Recommend attention on follow-up.   2.  Cardiomegaly              EKG tracings personally reviewed by me SR with PVCs    Echocardiogram images personally reviewed by me show preserved ejection fraction with severe AS and severe MS      CXR RL PNA personally reviewed    CT PNA with LAD      CT CAC and AV and MV dense calcification    All pertinent features of laboratory and imaging reviewed including primary images where applicable      Principal Problem:    Acute respiratory failure with hypoxia (HCC) (POA: Yes)  Active Problems:    Primary hypertension (POA: Yes)    Nonrheumatic mitral valve regurgitation (POA: Yes)    Prostate cancer (HCC) (POA: Yes)    Type 2 diabetes mellitus with hyperglycemia, without long-term current use of insulin (HCC) (POA: Yes)    Elevated troponin (POA: Yes)    High anion gap metabolic acidosis (POA: Yes)    Pneumonia (POA: Unknown)    Severe aortic stenosis (Chronic) (POA: Yes)    Severe mitral valve stenosis (Chronic) (POA: Yes)  Resolved Problems:    * No resolved hospital problems. *      Assessment / Plan:  Severe Aortic stenosis -this is complicated by the fact he also has severe mitral stenosis he does  have CHF with pleural effusions bilaterally and elevated BNP but need to avoid aggressive diuresis so can just monitor expectantly with low threshold to add low-dose diuretics    Will set up for valve evaluation as outpt after treatment of PNA per discussion of possible TAVR and medical therapy for his mitral stenosis versus aortic and mitral valve replacement surgically which is less likely    He can be safely discharged with close follow-up arranged    I personally discussed his case with  Dr John Jones D.O.    It is my pleasure to participate in the care of Mr. Martinez.  Please do not hesitate to contact me with questions or concerns.    Eze Crabtree MD PhD FACC  Cardiologist [facility]    8/21/2024    Please note that  this dictation was created using voice recognition software. There may be errors I did not discover before finalizing the note.

## 2024-08-21 NOTE — PROGRESS NOTES
Bedside report received from off going RN/tech: Rena, assumed care of patient.     Fall Risk Score: LOW RISK  Fall risk interventions in place: Place yellow fall risk ID band on patient, Provide patient/family education based on risk assessment, Educate patient/family to call staff for assistance when getting out of bed, Place fall precaution signage outside patient door, Place patient in room close to nursing station, Utilize bed/chair fall alarm, Notify charge of high risk for huddle, and Bed alarm connected correctly  Bed type: Regular (Jerome Score less than 17 interventions in place)  Patient on cardiac monitor: Yes, SR  IVF/IV medications: Not Applicable   Oxygen: How many liters 5L  Bedside sitter: Not Applicable   Isolation: Not applicable     no abdominal pain, no bloating, no constipation, no diarrhea, no nausea and no vomiting.

## 2024-08-21 NOTE — PROGRESS NOTES
Bedside report received from off going RN/tech: alba, assumed care of patient.     Fall Risk Score: MODERATE RISK  Fall risk interventions in place: Place yellow fall risk ID band on patient, Provide patient/family education based on risk assessment, Educate patient/family to call staff for assistance when getting out of bed, Place fall precaution signage outside patient door, Place patient in room close to nursing station, and Utilize bed/chair fall alarm  Bed type: Regular (Jerome Score less than 17 interventions in place)  Patient on cardiac monitor: Yes  IVF/IV medications: Not Applicable   Oxygen: How many liters 4L  Bedside sitter: Not Applicable   Isolation: Not applicable

## 2024-08-22 ENCOUNTER — PHARMACY VISIT (OUTPATIENT)
Dept: PHARMACY | Facility: MEDICAL CENTER | Age: 79
End: 2024-08-22
Payer: COMMERCIAL

## 2024-08-22 VITALS
DIASTOLIC BLOOD PRESSURE: 71 MMHG | TEMPERATURE: 97.9 F | HEIGHT: 70 IN | HEART RATE: 96 BPM | RESPIRATION RATE: 16 BRPM | OXYGEN SATURATION: 94 % | WEIGHT: 172.84 LBS | BODY MASS INDEX: 24.74 KG/M2 | SYSTOLIC BLOOD PRESSURE: 115 MMHG

## 2024-08-22 LAB
ALBUMIN SERPL BCP-MCNC: 3.7 G/DL (ref 3.2–4.9)
BASOPHILS # BLD AUTO: 0.3 % (ref 0–1.8)
BASOPHILS # BLD: 0.03 K/UL (ref 0–0.12)
BUN SERPL-MCNC: 28 MG/DL (ref 8–22)
CALCIUM ALBUM COR SERPL-MCNC: 9.6 MG/DL (ref 8.5–10.5)
CALCIUM SERPL-MCNC: 9.4 MG/DL (ref 8.5–10.5)
CHLORIDE SERPL-SCNC: 100 MMOL/L (ref 96–112)
CO2 SERPL-SCNC: 27 MMOL/L (ref 20–33)
CREAT SERPL-MCNC: 1.04 MG/DL (ref 0.5–1.4)
EOSINOPHIL # BLD AUTO: 0.14 K/UL (ref 0–0.51)
EOSINOPHIL NFR BLD: 1.4 % (ref 0–6.9)
ERYTHROCYTE [DISTWIDTH] IN BLOOD BY AUTOMATED COUNT: 49.4 FL (ref 35.9–50)
GFR SERPLBLD CREATININE-BSD FMLA CKD-EPI: 72 ML/MIN/1.73 M 2
GLUCOSE SERPL-MCNC: 169 MG/DL (ref 65–99)
HCT VFR BLD AUTO: 40.5 % (ref 42–52)
HGB BLD-MCNC: 13.6 G/DL (ref 14–18)
IMM GRANULOCYTES # BLD AUTO: 0.05 K/UL (ref 0–0.11)
IMM GRANULOCYTES NFR BLD AUTO: 0.5 % (ref 0–0.9)
L PNEUMO AG UR QL IA: NEGATIVE
LYMPHOCYTES # BLD AUTO: 1.18 K/UL (ref 1–4.8)
LYMPHOCYTES NFR BLD: 12.1 % (ref 22–41)
MAGNESIUM SERPL-MCNC: 2 MG/DL (ref 1.5–2.5)
MCH RBC QN AUTO: 30.8 PG (ref 27–33)
MCHC RBC AUTO-ENTMCNC: 33.6 G/DL (ref 32.3–36.5)
MCV RBC AUTO: 91.8 FL (ref 81.4–97.8)
MONOCYTES # BLD AUTO: 1.43 K/UL (ref 0–0.85)
MONOCYTES NFR BLD AUTO: 14.6 % (ref 0–13.4)
NEUTROPHILS # BLD AUTO: 6.96 K/UL (ref 1.82–7.42)
NEUTROPHILS NFR BLD: 71.1 % (ref 44–72)
NRBC # BLD AUTO: 0 K/UL
NRBC BLD-RTO: 0 /100 WBC (ref 0–0.2)
PHOSPHATE SERPL-MCNC: 3 MG/DL (ref 2.5–4.5)
PLATELET # BLD AUTO: 181 K/UL (ref 164–446)
PMV BLD AUTO: 11.6 FL (ref 9–12.9)
POTASSIUM SERPL-SCNC: 3.8 MMOL/L (ref 3.6–5.5)
RBC # BLD AUTO: 4.41 M/UL (ref 4.7–6.1)
S PNEUM AG UR QL: NEGATIVE
SODIUM SERPL-SCNC: 139 MMOL/L (ref 135–145)
WBC # BLD AUTO: 9.8 K/UL (ref 4.8–10.8)

## 2024-08-22 PROCEDURE — A9270 NON-COVERED ITEM OR SERVICE: HCPCS | Performed by: INTERNAL MEDICINE

## 2024-08-22 PROCEDURE — 85025 COMPLETE CBC W/AUTO DIFF WBC: CPT

## 2024-08-22 PROCEDURE — 83735 ASSAY OF MAGNESIUM: CPT

## 2024-08-22 PROCEDURE — A9270 NON-COVERED ITEM OR SERVICE: HCPCS | Performed by: STUDENT IN AN ORGANIZED HEALTH CARE EDUCATION/TRAINING PROGRAM

## 2024-08-22 PROCEDURE — A9270 NON-COVERED ITEM OR SERVICE: HCPCS

## 2024-08-22 PROCEDURE — 700102 HCHG RX REV CODE 250 W/ 637 OVERRIDE(OP)

## 2024-08-22 PROCEDURE — 80069 RENAL FUNCTION PANEL: CPT

## 2024-08-22 PROCEDURE — RXMED WILLOW AMBULATORY MEDICATION CHARGE: Performed by: INTERNAL MEDICINE

## 2024-08-22 PROCEDURE — 700102 HCHG RX REV CODE 250 W/ 637 OVERRIDE(OP): Performed by: INTERNAL MEDICINE

## 2024-08-22 PROCEDURE — 700102 HCHG RX REV CODE 250 W/ 637 OVERRIDE(OP): Performed by: STUDENT IN AN ORGANIZED HEALTH CARE EDUCATION/TRAINING PROGRAM

## 2024-08-22 PROCEDURE — 99239 HOSP IP/OBS DSCHRG MGMT >30: CPT | Performed by: INTERNAL MEDICINE

## 2024-08-22 RX ADMIN — AMOXICILLIN AND CLAVULANATE POTASSIUM 1 TABLET: 875; 125 TABLET, FILM COATED ORAL at 06:00

## 2024-08-22 RX ADMIN — METOPROLOL SUCCINATE 50 MG: 50 TABLET, EXTENDED RELEASE ORAL at 06:00

## 2024-08-22 RX ADMIN — Medication 5 MG: at 00:23

## 2024-08-22 RX ADMIN — AMLODIPINE BESYLATE 10 MG: 10 TABLET ORAL at 06:00

## 2024-08-22 ASSESSMENT — FIBROSIS 4 INDEX: FIB4 SCORE: 2.74

## 2024-08-22 NOTE — CARE PLAN
The patient is Stable - Low risk of patient condition declining or worsening    Shift Goals  Clinical Goals: Titrate oxygen  Patient Goals: Rest    Progress made toward(s) clinical / shift goals:      Problem: Knowledge Deficit - Standard  Goal: Patient and family/care givers will demonstrate understanding of plan of care, disease process/condition, diagnostic tests and medications  Description: Target End Date:  1-3 days or as soon as patient condition allows    Document in Patient Education    1.  Patient and family/caregiver oriented to unit, equipment, visitation policy and means for communicating concern  2.  Complete/review Learning Assessment  3.  Assess knowledge level of disease process/condition, treatment plan, diagnostic tests and medications  4.  Explain disease process/condition, treatment plan, diagnostic tests and medications  Outcome: Progressing  Note: Discussed POC, dicussed outpatient cath and possible TAVR/ anatomy.      Problem: Fall Risk  Goal: Patient will remain free from falls  Description: Target End Date:  Prior to discharge or change in level of care    Document interventions on the Pam Fuentes Fall Risk Assessment    1.  Assess for fall risk factors  2.  Implement fall precautions  Outcome: Progressing  Note: Refusing bed alarm, educated on risk and precautions.

## 2024-08-22 NOTE — PROGRESS NOTES
Hospitalist Hospitalist Hospitalist Hospitalist Monitor Summary  Rhythm: SR/ST  Rate:   Ectopy: (O) PVC, (R) Coup, (R) Bigem  .16 / .11 / .39         Neurosurgery Neurosurgery Orthopedics Pain Medicine Pain Medicine Rehab Medicine Thoracic Surgery Heme/Onc Hospitalist Hospitalist Hospitalist Hospitalist Hospitalist Hospitalist Hospitalist NSICU Neurosurgery Orthopedics Palliative Care Rehab Medicine Hospitalist Hospitalist Hospitalist Hospitalist Neurosurgery Neurosurgery Neurosurgery Neurosurgery Neurosurgery Neurosurgery Neurosurgery Orthopedics Palliative Care Rehab Medicine Rehab Medicine Neurosurgery Neurosurgery Pain Medicine Pain Medicine Palliative Care Heme/Onc Hospitalist Hospitalist Hospitalist Neurosurgery Neurosurgery Neurosurgery Rad Onc NSICU

## 2024-08-22 NOTE — PROGRESS NOTES
Patient to be discharged today - patient aware and agreeable to plan. D/c instructions reviewed with patient - ?'s/concerns answered. No piv present and waiting on meds to beds.

## 2024-08-22 NOTE — DISCHARGE SUMMARY
"Discharge Summary    CHIEF COMPLAINT ON ADMISSION  Chief Complaint   Patient presents with    Shortness of Breath           Cough    Chills       Reason for Admission  SOB     Admission Date  8/19/2024    CODE STATUS  Prior    HPI & HOSPITAL COURSE  Juan Manuel Martinez is a 79 y.o. male with HTN, prostate cancer, and T2DM who presented 8/19/2024 with Right chest discomfort and respiratory failure.  He reports for the past few days he's experienced worsening fatigue, shortness of breath, and \"chest congestion\" exacerbated by laying on the Right side.   In the ED he presented with tachycardia and hypoxia requiring 4 L/min to maintain 90%. Started on IV antibiotics for suspected pneumonia.  CT chest demonstrated concern for right upper lobe pneumonia, small bilateral pleural effusions. IV antibiotics continued. Mildly diuresed. 2D echo demonstrated severe aortic stenosis, severe mitral valve stenosis. Cardiology consulted and will connect patient with appropriate workup and valve team for outpatient follow up. Patient was weaned to room air and antibiotics deescalated. Initial WHC of 14 downtrended to 9. Patient improved clinically and was agreeable to discharge.     Therefore, he is discharged in fair and stable condition to home with close outpatient follow-up.    The patient met 2-midnight criteria for an inpatient stay at the time of discharge.    Discharge Date  8/22/2024    FOLLOW UP ITEMS POST DISCHARGE  08/22/2024    DISCHARGE DIAGNOSES  Principal Problem:    Acute respiratory failure with hypoxia (HCC) (POA: Yes)  Active Problems:    Pneumonia (POA: Unknown)    Severe aortic stenosis (Chronic) (POA: Yes)    Nonrheumatic mitral valve regurgitation (POA: Yes)    Prostate cancer (HCC) (POA: Yes)    Severe mitral valve stenosis (Chronic) (POA: Yes)    Primary hypertension (POA: Yes)    Type 2 diabetes mellitus with hyperglycemia, without long-term current use of insulin (HCC) (POA: Yes)    Elevated troponin (POA: " Yes)    High anion gap metabolic acidosis (POA: Yes)  Resolved Problems:    * No resolved hospital problems. *      FOLLOW UP  Future Appointments   Date Time Provider Department Center   9/4/2024 11:00 AM Shruti Weinstein M.D. [facility] None     Francois Penaloza M.D.  [address] [address]  [phone]    Follow up        MEDICATIONS ON DISCHARGE     Medication List        START taking these medications        Instructions   amoxicillin-clavulanate 875-125 MG Tabs  Commonly known as: Augmentin   Take 1 Tablet by mouth every 12 hours for 4 days.  Dose: 1 Tablet            CONTINUE taking these medications        Instructions   amLODIPine 10 MG Tabs  Commonly known as: Norvasc   Take 10 mg by mouth every day.  Dose: 10 mg     fenofibrate 160 MG tablet  Commonly known as: Triglide   Take 160 mg by mouth every day.  Dose: 160 mg     losartan-hydrochlorothiazide 100-12.5 MG per tablet  Commonly known as: Hyzaar   Take 1 Tablet by mouth every day.  Dose: 1 Tablet     metformin 1000 MG tablet  Commonly known as: Glucophage   Take 1,000 mg by mouth 2 times a day with meals.  Dose: 1,000 mg     metoprolol SR 50 MG Tb24  Commonly known as: Toprol XL   Take 50 mg by mouth every day.  Dose: 50 mg     Myrbetriq 50 MG Tb24  Generic drug: Mirabegron ER   Take 50 mg by mouth every day.  Dose: 50 mg     pravastatin 40 MG tablet  Commonly known as: Pravachol   Take 40 mg by mouth every evening.  Dose: 40 mg            STOP taking these medications      ibuprofen 200 MG Tabs  Commonly known as: Motrin              Allergies  No Known Allergies    DIET  No orders of the defined types were placed in this encounter.      LABORATORY  Lab Results   Component Value Date    SODIUM 139 08/22/2024    POTASSIUM 3.8 08/22/2024    CHLORIDE 100 08/22/2024    CO2 27 08/22/2024    GLUCOSE 169 (H) 08/22/2024    BUN 28 (H) 08/22/2024    CREATININE 1.04 08/22/2024        Lab Results   Component Value Date    WBC 9.8 08/22/2024     HEMOGLOBIN 13.6 (L) 08/22/2024    HEMATOCRIT 40.5 (L) 08/22/2024    PLATELETCT 181 08/22/2024        Total time of the discharge process exceeds 38 minutes.

## 2024-08-22 NOTE — PROGRESS NOTES
Bedside report received from off going RN/tech: Anca assumed care of patient.     Fall Risk Score: LOW RISK  Fall risk interventions in place: Place yellow fall risk ID band on patient, Provide patient/family education based on risk assessment, Educate patient/family to call staff for assistance when getting out of bed, Place fall precaution signage outside patient door, Place patient in room close to nursing station, Utilize bed/chair fall alarm, and Bed alarm connected correctly  Bed type: Regular (Jerome Score less than 17 interventions in place)  Patient on cardiac monitor: Yes  IVF/IV medications: Not Applicable   Oxygen: How many liters 2L  Bedside sitter: Not Applicable   Isolation: Not applicable

## 2024-08-22 NOTE — HOSPITAL COURSE
"Juan Manuel Martinez is a 79 y.o. male with HTN, prostate cancer, and T2DM who presented 8/19/2024 with Right chest discomfort and respiratory failure.  He reports for the past few days he's experienced worsening fatigue, shortness of breath, and \"chest congestion\" exacerbated by laying on the Right side.   In the ED he presented with tachycardia and hypoxia requiring 4 L/min to maintain 90%. Started on IV antibiotics for suspected pneumonia.  CT chest demonstrated concern for right upper lobe pneumonia, small bilateral pleural effusions. IV antibiotics continued. Mildly diuresed. 2D echo demonstrated severe aortic stenosis, severe mitral valve stenosis. Cardiology consulted and will connect patient with appropriate workup and valve team for outpatient follow up. Patient was weaned to room air and antibiotics deescalated. Initial WHC of 14 downtrended to 9. Patient improved clinically and was agreeable to discharge.   "

## 2024-08-22 NOTE — PROGRESS NOTES
Bedside report received from off going RN/tech: Anca assumed care of patient.     Fall Risk Score: MODERATE RISK  Fall risk interventions in place: Place yellow fall risk ID band on patient, Provide patient/family education based on risk assessment, Educate patient/family to call staff for assistance when getting out of bed, Place fall precaution signage outside patient door, and Place patient in room close to nursing station  Bed type: Regular (Jerome Score less than 17 interventions in place)  Patient on cardiac monitor: Yes  IVF/IV medications: Not Applicable   Oxygen: Room Air  Bedside sitter: Not Applicable   Isolation: Not applicable      Patient refusing bed alarm, management aware. Discussed risk patient verbalizing understanding.

## 2024-08-22 NOTE — PROGRESS NOTES
Patient being discharged. Pt educated on discharge instructions and new prescriptions, verbalized understanding. Follow up appointment made with Dr. Weinstein . PIV removed, monitor checked in. Patient going discharge lounge  via wheelchair

## 2024-08-27 NOTE — PROGRESS NOTES
REFERRING PHYSICIAN: Eze Crabtree MD    CONSULTING PHYSICIAN: Shruti Weinstein MD, FACS    CHIEF COMPLAINT: Shortness of breath    HISTORY OF PRESENT ILLNESS: The patient is an 79 y.o. 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 Today, he states he is having shortness of breath when laying down. He denies shortness of breath, chest pain, lower extremity edema, dizziness or syncope. He is not very active but could walk a block before having to stop and rest.    PAST MEDICAL HISTORY:   Active Ambulatory Problems     Diagnosis Date Noted    Acute respiratory failure with hypoxia ([facility]) 08/19/2024    Primary hypertension 08/19/2024    Nonrheumatic mitral valve regurgitation 08/19/2024    Prostate cancer ([facility]) 08/19/2024    Type 2 diabetes mellitus with hyperglycemia, without long-term current use of insulin ([facility]) 08/19/2024    Elevated troponin 08/19/2024    High anion gap metabolic acidosis 08/19/2024    Pneumonia 08/20/2024    Severe aortic stenosis 08/20/2024    Severe mitral valve stenosis 08/21/2024     Resolved Ambulatory Problems     Diagnosis Date Noted    No Resolved Ambulatory Problems     Past Medical History:   Diagnosis Date    Diabetes ([facility])     Hypertension      PAST SURGICAL HISTORY:   Past Surgical History:   Procedure Laterality Date    PROSTATECTOMY, RADIAL       ALLERGIES: No Known Allergies     CURRENT MEDICATIONS:   Current Outpatient Medications:     amLODIPine (NORVASC) 10 MG Tab, Take 10 mg by mouth every day., Disp: , Rfl:     fenofibrate (TRIGLIDE) 160 MG tablet, Take 160 mg by mouth every day., Disp: , Rfl:     losartan-hydrochlorothiazide (HYZAAR) 100-12.5 MG per tablet, Take 1 Tablet by mouth every day., Disp: , Rfl:     metformin (GLUCOPHAGE) 1000 MG tablet, Take 1,000 mg by mouth 2 times a day with meals.,  Disp: , Rfl:     metoprolol SR (TOPROL XL) 50 MG TABLET SR 24 HR, Take 50 mg by mouth every day., Disp: , Rfl:     MYRBETRIQ 50 MG TABLET SR 24 HR, Take 50 mg by mouth every day., Disp: , Rfl:     pravastatin (PRAVACHOL) 40 MG tablet, Take 40 mg by mouth every evening., Disp: , Rfl:     FAMILY HISTORY:   Family History   Problem Relation Age of Onset    Hypertension Mother     Breast Cancer Mother       SOCIAL HISTORY:   Social History     Socioeconomic History    Marital status:      Spouse name: Not on file    Number of children: Not on file    Years of education: Not on file    Highest education level: Not on file   Occupational History    Not on file   Tobacco Use    Smoking status: Never    Smokeless tobacco: Never   Substance and Sexual Activity    Alcohol use: Yes     Alcohol/week: 2.4 - 3.0 oz     Types: 4 - 5 Cans of beer per week    Drug use: Yes     Types: Inhaled     Comment: MJ some days    Sexual activity: Not on file   Other Topics Concern    Not on file   Social History Narrative    Not on file     Social Determinants of Health     Financial Resource Strain: Not on file   Food Insecurity: No Food Insecurity (8/19/2024)    Hunger Vital Sign     Worried About Running Out of Food in the Last Year: Never true     Ran Out of Food in the Last Year: Never true   Transportation Needs: No Transportation Needs (8/19/2024)    PRAPARE - Transportation     Lack of Transportation (Medical): No     Lack of Transportation (Non-Medical): No   Physical Activity: Not on file   Stress: Not on file   Social Connections: Not on file   Intimate Partner Violence: Not At Risk (8/19/2024)    Humiliation, Afraid, Rape, and Kick questionnaire     Fear of Current or Ex-Partner: No     Emotionally Abused: No     Physically Abused: No     Sexually Abused: No   Housing Stability: Low Risk  (8/19/2024)    Housing Stability Vital Sign     Unable to Pay for Housing in the Last Year: No     Number of Times Moved in the Last  "Year: 1     Homeless in the Last Year: No     REVIEW OF SYSTEMS:  Review of Systems   Constitutional: Negative.    HENT:  Positive for hearing loss.    Eyes:  Positive for blurred vision.   Respiratory: Negative.     Cardiovascular: Negative.    Gastrointestinal: Negative.    Genitourinary: Negative.    Musculoskeletal: Negative.    Skin: Negative.    Neurological: Negative.    Endo/Heme/Allergies: Negative.    Psychiatric/Behavioral: Negative.       PHYSICAL EXAMINATION:    /72 (BP Location: Left arm, Patient Position: Sitting, BP Cuff Size: Adult)   Pulse 89   Temp 36.3 °C (97.4 °F) (Temporal)   Ht 1.778 m (5' 10\")   Wt 82.5 kg (181 lb 12.8 oz)   SpO2 93%   BMI 26.09 kg/m²      Physical Exam  Constitutional:       General: He is not in acute distress.  HENT:      Head: Normocephalic.   Eyes:      Pupils: Pupils are equal, round, and reactive to light.   Cardiovascular:      Rate and Rhythm: Normal rate and regular rhythm.      Heart sounds: Murmur heard.      Systolic murmur is present with a grade of 3/6.      No gallop.   Pulmonary:      Effort: Pulmonary effort is normal. No respiratory distress.      Breath sounds: Normal breath sounds. No wheezing or rales.   Abdominal:      General: Bowel sounds are normal. There is no distension.      Palpations: Abdomen is soft.      Tenderness: There is no abdominal tenderness.   Musculoskeletal:         General: Normal range of motion.      Cervical back: Neck supple.   Skin:     General: Skin is warm and dry.   Neurological:      Mental Status: He is alert and oriented to person, place, and time.   Psychiatric:         Mood and Affect: Mood and affect normal.         Cognition and Memory: Memory normal.         Judgment: Judgment normal.     LABS REVIEWED:  Lab Results   Component Value Date/Time    SODIUM 139 08/22/2024 06:24 AM    POTASSIUM 3.8 08/22/2024 06:24 AM    CHLORIDE 100 08/22/2024 06:24 AM    CO2 27 08/22/2024 06:24 AM    GLUCOSE 169 (H) " "08/22/2024 06:24 AM    BUN 28 (H) 08/22/2024 06:24 AM    CREATININE 1.04 08/22/2024 06:24 AM      No results found for: \"PROTHROMBTM\", \"INR\"   Lab Results   Component Value Date/Time    WBC 9.8 08/22/2024 06:24 AM    RBC 4.41 (L) 08/22/2024 06:24 AM    HEMOGLOBIN 13.6 (L) 08/22/2024 06:24 AM    HEMATOCRIT 40.5 (L) 08/22/2024 06:24 AM    MCV 91.8 08/22/2024 06:24 AM    MCH 30.8 08/22/2024 06:24 AM    MCHC 33.6 08/22/2024 06:24 AM    MPV 11.6 08/22/2024 06:24 AM    NEUTSPOLYS 71.10 08/22/2024 06:24 AM    LYMPHOCYTES 12.10 (L) 08/22/2024 06:24 AM    MONOCYTES 14.60 (H) 08/22/2024 06:24 AM    EOSINOPHILS 1.40 08/22/2024 06:24 AM    BASOPHILS 0.30 08/22/2024 06:24 AM      IMAGING REVIEWED AND INTERPRETED:    ECHOCARDIOGRAM [facility] [place], [place] 8/19/24:  Normal left ventricular systolic function.   Heavily calcified aortic valve leaflets. Severe aortic valve stenosis.   Aortic valve area calculated from the continuity equation is 0.5cm2.   Transvalvular gradients are - Peak: 91 mmHg, Mean: 48 mmHg. Mild aortic insufficiency.   Heavily calcification of the mitral valve leaflets. Moderate mitral   annular calcification. Severe mitral stenosis. Mean transvalvular   gradient is 8 mmHg at a heart rate of 86 BPM. Moderate mitral   regurgitation.    CARDIAC CATHETERIZATION   none    CT SCAN CHEST [facility] [place] [place] 8/19/24:  1.  Coronary calcification.  2.  Aortic valve calcification and mitral annulus calcification.  3.  Multiple scattered small mediastinal lymph nodes. The largest mediastinal node is a precarinal node favoring the right which measures 14 mm in short axis. These are nonspecific but considering the pulmonary infiltrates these may represent reactive   nodes.  4.  Extensive airspace opacities most prominent in the right upper lobe, also involving the right middle lobe, right lower lobe, and left upper lobe. Findings most consistent with acute pneumonia.  5.  Small bilateral pleural effusions, right greater than left.  6.  Calcified old " granulomatous disease in the liver.  7.  Cholelithiasis. Calcified gallstone at the gallbladder neck. Porcelain gallbladder.       IMPRESSION:  Severe aortic stenosis, moderate to severe mitral stenosis, moderate mitral regurgitation, prostate cancer recurrence, s/p prostatectomy, hypertension, diabetes mellitus,    PLAN:  The patient could be considered for high risk aortic valve and mitral valve replacements. The operative mortality risk is approximately 8-10%. The STS mortality risk score is 4.1% and the morbidity and mortality risk score is 22% MVR. He STS mortality risk score is 2.4% and the morbidity and mortality risk score is 16% MVR.The scores were discussed with patient.  The better approach, however, may be to address his critical aortic stenosis with a transcatheter aortic valve replacement (TAVR) at this time.  Subsequently, he was mitral valve function can be reevaluated. The procedure, its risks, benefits, potential complications and alternative treatments were discussed with the patient in detail.  The patient is reluctant to have open heart surgery.  He prefers this approach better.    Findings and recommendations have been discussed with the patient’s cardiologist, Eze Crabtree MD and Jerrell Eller MD.  Thank you for this very challenging consultation and participation in the patient’s care.  I will keep you apprised of all future developments.    Sincerely,    Shruti Weinstein MD, FACS

## 2024-09-04 ENCOUNTER — OFFICE VISIT (OUTPATIENT)
Dept: CARDIOTHORACIC SURGERY | Facility: MEDICAL CENTER | Age: 79
End: 2024-09-04
Payer: MEDICARE

## 2024-09-04 VITALS
HEART RATE: 89 BPM | SYSTOLIC BLOOD PRESSURE: 116 MMHG | HEIGHT: 70 IN | TEMPERATURE: 97.4 F | DIASTOLIC BLOOD PRESSURE: 72 MMHG | OXYGEN SATURATION: 93 % | WEIGHT: 181.8 LBS | BODY MASS INDEX: 26.03 KG/M2

## 2024-09-04 DIAGNOSIS — I35.0 SEVERE AORTIC STENOSIS: ICD-10-CM

## 2024-09-04 DIAGNOSIS — I05.0 MITRAL VALVE STENOSIS, UNSPECIFIED ETIOLOGY: ICD-10-CM

## 2024-09-04 PROCEDURE — 3078F DIAST BP <80 MM HG: CPT | Performed by: THORACIC SURGERY (CARDIOTHORACIC VASCULAR SURGERY)

## 2024-09-04 PROCEDURE — 99205 OFFICE O/P NEW HI 60 MIN: CPT | Performed by: THORACIC SURGERY (CARDIOTHORACIC VASCULAR SURGERY)

## 2024-09-04 PROCEDURE — 3074F SYST BP LT 130 MM HG: CPT | Performed by: THORACIC SURGERY (CARDIOTHORACIC VASCULAR SURGERY)

## 2024-09-04 ASSESSMENT — ENCOUNTER SYMPTOMS
RESPIRATORY NEGATIVE: 1
BLURRED VISION: 1
MUSCULOSKELETAL NEGATIVE: 1
CARDIOVASCULAR NEGATIVE: 1
CONSTITUTIONAL NEGATIVE: 1
NEUROLOGICAL NEGATIVE: 1
PSYCHIATRIC NEGATIVE: 1
GASTROINTESTINAL NEGATIVE: 1

## 2024-09-04 ASSESSMENT — FIBROSIS 4 INDEX: FIB4 SCORE: 2.47

## 2024-09-05 ENCOUNTER — TELEPHONE (OUTPATIENT)
Dept: CARDIOLOGY | Facility: MEDICAL CENTER | Age: 79
End: 2024-09-05
Payer: MEDICARE

## 2024-09-05 DIAGNOSIS — Z01.810 PRE-PROCEDURAL CARDIOVASCULAR EXAMINATION: ICD-10-CM

## 2024-09-05 DIAGNOSIS — I35.0 SEVERE AORTIC STENOSIS: Chronic | ICD-10-CM

## 2024-09-05 NOTE — TELEPHONE ENCOUNTER
Referral from: Dr. Crabtree for Severe AS, Severe MS, Moderate MR    Patient called on 9/5/2024.    Patient states he is currently in the shower and requests a call back in 20 minutes. RN to reach out at that time.

## 2024-09-05 NOTE — TELEPHONE ENCOUNTER
Called and spoke to patient.    Discussed with patient consultation appointments, testing needed, and plan of care.    Patient given dates and times of testing and consultations.    All questions answered.    Phone number given to patient for [facility] for any further questions or concerns.

## 2024-09-06 ENCOUNTER — TELEPHONE (OUTPATIENT)
Dept: CARDIOLOGY | Facility: MEDICAL CENTER | Age: 79
End: 2024-09-06
Payer: MEDICARE

## 2024-09-06 NOTE — TELEPHONE ENCOUNTER
Pre TAVR angio spot held on 9-23-24 at 7:30 with Dr.Jad Otto. Instruction sheet was emailed to Maria Esther JACOBO.

## 2024-09-06 NOTE — TELEPHONE ENCOUNTER
----- Message from Nurse Maria Esther LIM R.N. sent at 9/5/2024  4:13 PM PDT -----  Regarding: Pre TAVR Cath  Hey! Please hold pre TAVR cath 9/23-9/24. Thanks!

## 2024-09-18 ENCOUNTER — HOSPITAL ENCOUNTER (OUTPATIENT)
Dept: RADIOLOGY | Facility: MEDICAL CENTER | Age: 79
End: 2024-09-18
Payer: MEDICARE

## 2024-09-18 ENCOUNTER — OFFICE VISIT (OUTPATIENT)
Dept: CARDIOLOGY | Facility: MEDICAL CENTER | Age: 79
End: 2024-09-18
Attending: INTERNAL MEDICINE
Payer: MEDICARE

## 2024-09-18 ENCOUNTER — DOCUMENTATION (OUTPATIENT)
Dept: CARDIOLOGY | Facility: MEDICAL CENTER | Age: 79
End: 2024-09-18

## 2024-09-18 ENCOUNTER — TELEPHONE (OUTPATIENT)
Dept: CARDIOLOGY | Facility: MEDICAL CENTER | Age: 79
End: 2024-09-18

## 2024-09-18 VITALS
RESPIRATION RATE: 16 BRPM | OXYGEN SATURATION: 92 % | HEART RATE: 105 BPM | SYSTOLIC BLOOD PRESSURE: 100 MMHG | DIASTOLIC BLOOD PRESSURE: 64 MMHG | WEIGHT: 186 LBS | BODY MASS INDEX: 26.63 KG/M2 | HEIGHT: 70 IN

## 2024-09-18 DIAGNOSIS — I05.0 SEVERE MITRAL VALVE STENOSIS: Chronic | ICD-10-CM

## 2024-09-18 DIAGNOSIS — E11.65 TYPE 2 DIABETES MELLITUS WITH HYPERGLYCEMIA, WITHOUT LONG-TERM CURRENT USE OF INSULIN (HCC): ICD-10-CM

## 2024-09-18 DIAGNOSIS — I34.0 NONRHEUMATIC MITRAL VALVE REGURGITATION: ICD-10-CM

## 2024-09-18 DIAGNOSIS — I35.0 SEVERE AORTIC STENOSIS: ICD-10-CM

## 2024-09-18 DIAGNOSIS — Z01.810 PRE-PROCEDURAL CARDIOVASCULAR EXAMINATION: ICD-10-CM

## 2024-09-18 DIAGNOSIS — I35.0 SEVERE AORTIC STENOSIS: Chronic | ICD-10-CM

## 2024-09-18 DIAGNOSIS — I50.33 ACUTE ON CHRONIC DIASTOLIC HEART FAILURE (HCC): ICD-10-CM

## 2024-09-18 DIAGNOSIS — Z00.6 EXAMINATION OF PARTICIPANT IN CLINICAL TRIAL: ICD-10-CM

## 2024-09-18 PROCEDURE — 76497 UNLISTED CT PROCEDURE: CPT

## 2024-09-18 PROCEDURE — 99205 OFFICE O/P NEW HI 60 MIN: CPT | Performed by: INTERNAL MEDICINE

## 2024-09-18 PROCEDURE — 700117 HCHG RX CONTRAST REV CODE 255

## 2024-09-18 PROCEDURE — 3078F DIAST BP <80 MM HG: CPT | Performed by: INTERNAL MEDICINE

## 2024-09-18 PROCEDURE — 99213 OFFICE O/P EST LOW 20 MIN: CPT | Mod: 25 | Performed by: INTERNAL MEDICINE

## 2024-09-18 PROCEDURE — 3074F SYST BP LT 130 MM HG: CPT | Performed by: INTERNAL MEDICINE

## 2024-09-18 RX ORDER — FUROSEMIDE 20 MG
20 TABLET ORAL DAILY
Qty: 60 TABLET | Refills: 1 | Status: ON HOLD | OUTPATIENT
Start: 2024-09-18

## 2024-09-18 RX ADMIN — IOHEXOL 100 ML: 350 INJECTION, SOLUTION INTRAVENOUS at 09:00

## 2024-09-18 ASSESSMENT — PATIENT HEALTH QUESTIONNAIRE - PHQ9
CLINICAL INTERPRETATION OF PHQ2 SCORE: 4
SUM OF ALL RESPONSES TO PHQ QUESTIONS 1-9: 12
5. POOR APPETITE OR OVEREATING: 2 - MORE THAN HALF THE DAYS

## 2024-09-18 ASSESSMENT — FIBROSIS 4 INDEX: FIB4 SCORE: 2.47

## 2024-09-18 NOTE — PROGRESS NOTES
Valve Program Functional Assessment:     KCCQ12   1a) Showering/bathing: 3  1b) Walking 1 block on ground: 5  1c) Hurrying or joggin  2) Swellin  3) Fatigue: 1  4) Shortness of breath: 1  5) Sleep sitting up: 1  6) Limited enjoyment of life: 3  7) Spend the rest of your life with HF: 1  8a) Hobbies, recreational activities:3  8b) Working or doing household chores:3  8c) Visiting family or friends: 3    5 meter walk test  1) __4.47____ s/5m  2) __4.67____ s/5m  3) __4.75____ s/5m  AVG:_4.6______     Strength   1) _36_____ kg  2) _32_____ kg  3) _32_____ kg  AVG:__33.3____    MOSQUEDA ADLs  Patient independently preforms...   - Bathing: Yes   - Dressing: Yes   - Toileting: Yes   - Transferring: Yes   - Continence: Yes   - Feeding: Yes   Total Score: _6_/6    Living Situation  Patient lives: with spouse    Mobility Aids   Patient uses:  none      FRAILTY SCORE: _0_/ 4

## 2024-09-18 NOTE — PROGRESS NOTES
"Valve Program Consultation: 9/18/2024 for tentative TAVR 9/30/2024    Mental Status Assessment:  Appearance: normal  Behavior: normal  Mood/Affect: normal  Thought process/content: normal  Cognition: normal  Functional ability: limited over the last month d/t fatigue, low energy, SOB  Dental Concerns: patient denies s/s of active oral infection  Further mental assessment needed: no    Post-op Plan of Care:  Support systems: wife Ely present during consultation and participating in discussion. Daughter Tatiana  Patient understands discharge plan: yes  DME: none  Home health warranted prior to procedure: no  Social concerns for discharge: none  PCP alerted of social concerns: n/a  POLST: none  Advance directive: not on file, encouraged patient to bring a copy for his chart  Post-op goal: \"so I don't die.\" Improve fatigue, energy, SOB. Begin radiation therapy for prostate tumor.   Medication instructions: hold vitamins/supplements x7 days, hold fenofibrate/losartan-HCTZ x24hrs, hold metformin AM of procedure    Concerns prior to procedure: sev MS, prostate cancer pending radiation therapy (Dr. Hua [place] [place])    Met with patient during New TAVR consult.     All patient's questions and concerns were addressed during this visit. They understood pre-operative and post-operative plan of care.    Reviewed patient TAVR education packet explaining that information is provided regarding preparation for TAVR the night prior, what to expect during the hospital stay, average LOS, and what to look out for post TAVR discharge. Explained that patient will require SBE prophylactic antibiotic prior to any dental treatment post TAVR. Advised patient not to receive any new vaccinations within 1 week pre- and 1 week post-procedure.     Explained that patient should not eat or drink anything past midnight the day of the procedure. Encouraged patient to wear something clean and comfortable, easy to get on/off to check in " Monday morning, time TBD. Patient may have friends and family in the pre-operational area until patient is taken to the operating room, at which time family and friends will be asked to wait on floor 1 [facility] surgical waiting area. On completion of TAVR, heart team will update family. Once update is given, there is some time before family/friends may visit patient in their assigned room. Length of stay on average is one night, however patient may stay longer depending on specific needs at that time. Patient given printed instructions sheet with all above stated instructions. Patient states understanding of all material and education presented today and has no further questions at this time. Encouraged patient again, to contact me with any questions or concerns during this work-up process. Patient states understanding.

## 2024-09-18 NOTE — PROGRESS NOTES
"    Interventional cardiology Initial Consultation Note      Chief Complaint: Aortic, mitral stenosis    Juan Manuel Martinez is a 79 y.o. male  patient presented today to establish care for aortic, mitral stenosis.  He was recently hospitalized with pneumonia, shortness of breath, found to have critical aortic stenosis, moderate to severe mitral stenosis, regurgitation.  He has been having progressive fatigue, dyspnea on exertion, orthopnea, unable to sleep at night due to shortness of breath.        Past Medical History:   Diagnosis Date    Diabetes (HCC)     Hypertension     Prostate cancer (HCC)     Prostate cancer (HCC)     Severe aortic stenosis 08/20/2024    Severe mitral valve stenosis 08/21/2024             Current Outpatient Medications   Medication Sig Dispense Refill    furosemide (LASIX) 20 MG Tab Take 1 Tablet by mouth every day. 60 Tablet 1    amLODIPine (NORVASC) 10 MG Tab Take 10 mg by mouth every day.      fenofibrate (TRIGLIDE) 160 MG tablet Take 160 mg by mouth every day.      losartan-hydrochlorothiazide (HYZAAR) 100-12.5 MG per tablet Take 1 Tablet by mouth every day.      metformin (GLUCOPHAGE) 1000 MG tablet Take 1,000 mg by mouth 2 times a day with meals.      metoprolol SR (TOPROL XL) 50 MG TABLET SR 24 HR Take 50 mg by mouth every day.      MYRBETRIQ 50 MG TABLET SR 24 HR Take 50 mg by mouth every day.      pravastatin (PRAVACHOL) 40 MG tablet Take 40 mg by mouth every evening.       No current facility-administered medications for this visit.             Physical Exam:  Ambulatory Vitals  /64 (BP Location: Left arm, Patient Position: Sitting, BP Cuff Size: Adult)   Pulse (!) 105   Resp 16   Ht 1.778 m (5' 10\")   Wt 84.4 kg (186 lb)   SpO2 92%    Oxygen Therapy:  Pulse Oximetry: 92 %  BP Readings from Last 4 Encounters:   09/18/24 100/64   09/04/24 116/72   08/22/24 115/71       Weight/BMI: Body mass index is 26.69 kg/m².  Wt Readings from Last 4 Encounters:   09/18/24 84.4 kg " (186 lb)   09/04/24 82.5 kg (181 lb 12.8 oz)   08/22/24 78.4 kg (172 lb 13.5 oz)           General: Well appearing and in no apparent distress  Neck: carotid bruits absent  Lungs: respiratory sounds  normal  Heart: Regular rhythm,  No palpable thrills on palpation, murmurs present, no rubs,   Lower extremity edema present.       Echocardiogram 8/19/2024 reviewed, independently interpreted, severely calcified aortic valve with critical aortic valve stenosis, severely calcified mitral valve with moderate to severe stenosis, moderate to severe regurgitation      Medical Decision Making:  Problem List Items Addressed This Visit       Severe mitral valve stenosis (Chronic)    Relevant Medications    furosemide (LASIX) 20 MG Tab    Nonrheumatic mitral valve regurgitation    Relevant Medications    furosemide (LASIX) 20 MG Tab    Type 2 diabetes mellitus with hyperglycemia, without long-term current use of insulin (HCC)     Other Visit Diagnoses       Acute on chronic diastolic heart failure (HCC)        Relevant Medications    furosemide (LASIX) 20 MG Tab    Other Relevant Orders    DME Home Oxygen          He has severe symptomatic critical aortic stenosis, severe mitral stenosis.  He was evaluated by , who felt aortic valve replacement is a good idea prior to fixing his mitral valve down the road.  Which I agree with.    Today's encounter addressed an illness with threat to life/bodily function symptomatic severe aortic valve stenosis, NYHA III, stage C.  Symptomatology, progression, associated high morbidity, mortality of symptomatic severe aortic stenosis discussed with the patient.  Treatment options including conservative management, surgical, transcatheter aortic valve replacement discussed.  Patient would like to proceed with transcatheter aortic valve replacement.  Risk benefits of the procedure including but not limited to death, stroke, permanent pacemaker, vascular complications discussed.  All the  questions answered.    Preoperative coronary angiogram prior to transcatheter aortic valve replacement discussed with the patient.  Risk benefits of this procedure discussed in detail.    Plan of care personally discussed with Dr. Crabtree,       This note was dictated using Dragon speech recognition software.    Jerrell YO  Interventional cardiologist  [facility], [address].  [address] [address]  Phone: [phone]  Fax: [phone]

## 2024-09-18 NOTE — TELEPHONE ENCOUNTER
Patient is scheduled on 9-23-24 for a Pre TAVR angio with Dr. David Otto. Patient was told to hold Metforin day of and 48hrs after. Patient to check in at 5:30 for a 7:30 procedure. H&P was done on 9-18-24 by . Pre admit  to call patient.

## 2024-09-19 ENCOUNTER — TELEPHONE (OUTPATIENT)
Dept: CARDIOLOGY | Facility: MEDICAL CENTER | Age: 79
End: 2024-09-19
Payer: MEDICARE

## 2024-09-19 NOTE — TELEPHONE ENCOUNTER
Patient's wife called requesting Dr. Eller to fill out disabled placard request because patient is unable to walk from parking lot to store d/t fatigue/SOB. Advised if she wants to drop off the paperwork at the office, I would have Dr. Eller review and sign it if warranted.

## 2024-09-20 ENCOUNTER — TELEPHONE (OUTPATIENT)
Dept: CARDIOLOGY | Facility: MEDICAL CENTER | Age: 79
End: 2024-09-20
Payer: MEDICARE

## 2024-09-20 ENCOUNTER — APPOINTMENT (OUTPATIENT)
Dept: ADMISSIONS | Facility: MEDICAL CENTER | Age: 79
End: 2024-09-20
Attending: INTERNAL MEDICINE
Payer: MEDICARE

## 2024-09-20 RX ORDER — VITAMIN E (DL,TOCOPHERYL ACET) 45 MG/0.25
1 DROPS ORAL DAILY
Status: ON HOLD | COMMUNITY

## 2024-09-20 RX ORDER — KRILL/OM-3/DHA/EPA/PHOSPHO/AST 500MG-86MG
500 CAPSULE ORAL DAILY
Status: ON HOLD | COMMUNITY

## 2024-09-20 NOTE — OR NURSING
Preadmit: Telephone preadmit done with patient for two scheduled upcoming procedures; cath lab procedure on 9/23/24 and TAVR procedure on 9/30/24 with Dr. Eller. Pre-procedure instructions, fasting guidelines, check in location, and medication instructions reviewed with patient for both procedures. For procedure on 9/23/24, patient should hold metformin day of procedure.   For surgery on 9/30/24, Patient to hold any vitamins and supplements 7 days prior to procedure. Patient to hold aspirin, aleve and ibuprofen 5 days prior to procedure. Patient also to hold fenofibrate and losartan/hctz 24 hours prior to surgery. Hold furosemide and metformin day of surgery. Patient verbalized understanding of all instructions. Copy of medication instructions will be provided to patient at PAT testing appointment on 9/25/24.

## 2024-09-23 ENCOUNTER — DOCUMENTATION (OUTPATIENT)
Dept: CARDIOLOGY | Facility: MEDICAL CENTER | Age: 79
End: 2024-09-23

## 2024-09-23 ENCOUNTER — APPOINTMENT (OUTPATIENT)
Dept: CARDIOLOGY | Facility: MEDICAL CENTER | Age: 79
End: 2024-09-23
Attending: INTERNAL MEDICINE
Payer: MEDICARE

## 2024-09-23 ENCOUNTER — HOSPITAL ENCOUNTER (OUTPATIENT)
Facility: MEDICAL CENTER | Age: 79
End: 2024-09-23
Attending: INTERNAL MEDICINE | Admitting: INTERNAL MEDICINE
Payer: MEDICARE

## 2024-09-23 VITALS
WEIGHT: 181.88 LBS | DIASTOLIC BLOOD PRESSURE: 70 MMHG | HEIGHT: 70 IN | HEART RATE: 86 BPM | RESPIRATION RATE: 22 BRPM | SYSTOLIC BLOOD PRESSURE: 93 MMHG | TEMPERATURE: 96.5 F | BODY MASS INDEX: 26.04 KG/M2 | OXYGEN SATURATION: 93 %

## 2024-09-23 DIAGNOSIS — Z01.810 PRE-PROCEDURAL CARDIOVASCULAR EXAMINATION: ICD-10-CM

## 2024-09-23 DIAGNOSIS — I35.0 SEVERE AORTIC STENOSIS: Chronic | ICD-10-CM

## 2024-09-23 LAB
ALBUMIN SERPL BCP-MCNC: 3.8 G/DL (ref 3.2–4.9)
ALBUMIN/GLOB SERPL: 1.2 G/DL
ALP SERPL-CCNC: 48 U/L (ref 30–99)
ALT SERPL-CCNC: 19 U/L (ref 2–50)
ANION GAP SERPL CALC-SCNC: 12 MMOL/L (ref 7–16)
APTT PPP: 29 SEC (ref 24.7–36)
AST SERPL-CCNC: 24 U/L (ref 12–45)
BASOPHILS # BLD AUTO: 0.4 % (ref 0–1.8)
BASOPHILS # BLD: 0.04 K/UL (ref 0–0.12)
BILIRUB SERPL-MCNC: 0.7 MG/DL (ref 0.1–1.5)
BUN SERPL-MCNC: 31 MG/DL (ref 8–22)
CALCIUM ALBUM COR SERPL-MCNC: 9.6 MG/DL (ref 8.5–10.5)
CALCIUM SERPL-MCNC: 9.4 MG/DL (ref 8.5–10.5)
CHLORIDE SERPL-SCNC: 104 MMOL/L (ref 96–112)
CO2 SERPL-SCNC: 22 MMOL/L (ref 20–33)
CREAT SERPL-MCNC: 1.12 MG/DL (ref 0.5–1.4)
EKG IMPRESSION: NORMAL
EOSINOPHIL # BLD AUTO: 0.03 K/UL (ref 0–0.51)
EOSINOPHIL NFR BLD: 0.3 % (ref 0–6.9)
ERYTHROCYTE [DISTWIDTH] IN BLOOD BY AUTOMATED COUNT: 51.1 FL (ref 35.9–50)
GFR SERPLBLD CREATININE-BSD FMLA CKD-EPI: 66 ML/MIN/1.73 M 2
GLOBULIN SER CALC-MCNC: 3.2 G/DL (ref 1.9–3.5)
GLUCOSE SERPL-MCNC: 142 MG/DL (ref 65–99)
HCT VFR BLD AUTO: 39.3 % (ref 42–52)
HGB BLD-MCNC: 13.2 G/DL (ref 14–18)
IMM GRANULOCYTES # BLD AUTO: 0.06 K/UL (ref 0–0.11)
IMM GRANULOCYTES NFR BLD AUTO: 0.7 % (ref 0–0.9)
INR PPP: 1.07 (ref 0.87–1.13)
LYMPHOCYTES # BLD AUTO: 1.18 K/UL (ref 1–4.8)
LYMPHOCYTES NFR BLD: 12.8 % (ref 22–41)
MCH RBC QN AUTO: 30.6 PG (ref 27–33)
MCHC RBC AUTO-ENTMCNC: 33.6 G/DL (ref 32.3–36.5)
MCV RBC AUTO: 91.2 FL (ref 81.4–97.8)
MONOCYTES # BLD AUTO: 1.01 K/UL (ref 0–0.85)
MONOCYTES NFR BLD AUTO: 10.9 % (ref 0–13.4)
NEUTROPHILS # BLD AUTO: 6.91 K/UL (ref 1.82–7.42)
NEUTROPHILS NFR BLD: 74.9 % (ref 44–72)
NRBC # BLD AUTO: 0 K/UL
NRBC BLD-RTO: 0 /100 WBC (ref 0–0.2)
NT-PROBNP SERPL IA-MCNC: 1996 PG/ML (ref 0–125)
PLATELET # BLD AUTO: 265 K/UL (ref 164–446)
PMV BLD AUTO: 10.9 FL (ref 9–12.9)
POTASSIUM SERPL-SCNC: 3.6 MMOL/L (ref 3.6–5.5)
PROT SERPL-MCNC: 7 G/DL (ref 6–8.2)
PROTHROMBIN TIME: 13.9 SEC (ref 12–14.6)
RBC # BLD AUTO: 4.31 M/UL (ref 4.7–6.1)
SODIUM SERPL-SCNC: 138 MMOL/L (ref 135–145)
WBC # BLD AUTO: 9.2 K/UL (ref 4.8–10.8)

## 2024-09-23 PROCEDURE — 700117 HCHG RX CONTRAST REV CODE 255: Performed by: INTERNAL MEDICINE

## 2024-09-23 PROCEDURE — 160047 HCHG PACU  - EA ADDL 30 MINS PHASE II

## 2024-09-23 PROCEDURE — 160046 HCHG PACU - 1ST 60 MINS PHASE II

## 2024-09-23 PROCEDURE — 160035 HCHG PACU - 1ST 60 MINS PHASE I

## 2024-09-23 PROCEDURE — A9270 NON-COVERED ITEM OR SERVICE: HCPCS | Performed by: INTERNAL MEDICINE

## 2024-09-23 PROCEDURE — 700111 HCHG RX REV CODE 636 W/ 250 OVERRIDE (IP)

## 2024-09-23 PROCEDURE — G0278 ILIAC ART ANGIO,CARDIAC CATH: HCPCS | Performed by: INTERNAL MEDICINE

## 2024-09-23 PROCEDURE — A9270 NON-COVERED ITEM OR SERVICE: HCPCS

## 2024-09-23 PROCEDURE — 99152 MOD SED SAME PHYS/QHP 5/>YRS: CPT | Performed by: INTERNAL MEDICINE

## 2024-09-23 PROCEDURE — 93010 ELECTROCARDIOGRAM REPORT: CPT | Performed by: INTERNAL MEDICINE

## 2024-09-23 PROCEDURE — 85610 PROTHROMBIN TIME: CPT

## 2024-09-23 PROCEDURE — 160002 HCHG RECOVERY MINUTES (STAT)

## 2024-09-23 PROCEDURE — 85730 THROMBOPLASTIN TIME PARTIAL: CPT

## 2024-09-23 PROCEDURE — 80053 COMPREHEN METABOLIC PANEL: CPT

## 2024-09-23 PROCEDURE — 700102 HCHG RX REV CODE 250 W/ 637 OVERRIDE(OP)

## 2024-09-23 PROCEDURE — 160036 HCHG PACU - EA ADDL 30 MINS PHASE I

## 2024-09-23 PROCEDURE — 83880 ASSAY OF NATRIURETIC PEPTIDE: CPT

## 2024-09-23 PROCEDURE — 93005 ELECTROCARDIOGRAM TRACING: CPT | Performed by: INTERNAL MEDICINE

## 2024-09-23 PROCEDURE — 700101 HCHG RX REV CODE 250

## 2024-09-23 PROCEDURE — 700102 HCHG RX REV CODE 250 W/ 637 OVERRIDE(OP): Performed by: INTERNAL MEDICINE

## 2024-09-23 PROCEDURE — 99152 MOD SED SAME PHYS/QHP 5/>YRS: CPT

## 2024-09-23 PROCEDURE — 85025 COMPLETE CBC W/AUTO DIFF WBC: CPT

## 2024-09-23 PROCEDURE — 93454 CORONARY ARTERY ANGIO S&I: CPT | Mod: 26 | Performed by: INTERNAL MEDICINE

## 2024-09-23 RX ORDER — ASPIRIN 81 MG/1
TABLET, CHEWABLE ORAL
Status: COMPLETED
Start: 2024-09-23 | End: 2024-09-23

## 2024-09-23 RX ORDER — ASPIRIN 325 MG
325 TABLET ORAL ONCE
Status: COMPLETED | OUTPATIENT
Start: 2024-09-23 | End: 2024-09-23

## 2024-09-23 RX ORDER — HYDROMORPHONE HYDROCHLORIDE 1 MG/ML
0.5 INJECTION, SOLUTION INTRAMUSCULAR; INTRAVENOUS; SUBCUTANEOUS ONCE
Status: DISCONTINUED | OUTPATIENT
Start: 2024-09-23 | End: 2024-09-23 | Stop reason: CLARIF

## 2024-09-23 RX ORDER — MIDAZOLAM HYDROCHLORIDE 1 MG/ML
INJECTION INTRAMUSCULAR; INTRAVENOUS
Status: COMPLETED
Start: 2024-09-23 | End: 2024-09-23

## 2024-09-23 RX ORDER — VERAPAMIL HYDROCHLORIDE 2.5 MG/ML
INJECTION, SOLUTION INTRAVENOUS
Status: COMPLETED
Start: 2024-09-23 | End: 2024-09-23

## 2024-09-23 RX ORDER — HEPARIN SODIUM 200 [USP'U]/100ML
INJECTION, SOLUTION INTRAVENOUS
Status: COMPLETED
Start: 2024-09-23 | End: 2024-09-23

## 2024-09-23 RX ORDER — PHENYLEPHRINE HCL IN 0.9% NACL 1 MG/10 ML
SYRINGE (ML) INTRAVENOUS
Status: COMPLETED
Start: 2024-09-23 | End: 2024-09-23

## 2024-09-23 RX ORDER — LIDOCAINE HYDROCHLORIDE 20 MG/ML
INJECTION, SOLUTION INFILTRATION; PERINEURAL
Status: COMPLETED
Start: 2024-09-23 | End: 2024-09-23

## 2024-09-23 RX ORDER — HEPARIN SODIUM 1000 [USP'U]/ML
INJECTION, SOLUTION INTRAVENOUS; SUBCUTANEOUS
Status: COMPLETED
Start: 2024-09-23 | End: 2024-09-23

## 2024-09-23 RX ADMIN — ASPIRIN 325 MG: 325 TABLET ORAL at 07:28

## 2024-09-23 RX ADMIN — HEPARIN SODIUM 2000 UNITS: 200 INJECTION, SOLUTION INTRAVENOUS at 07:45

## 2024-09-23 RX ADMIN — VERAPAMIL HYDROCHLORIDE 2.5 MG: 2.5 INJECTION, SOLUTION INTRAVENOUS at 07:45

## 2024-09-23 RX ADMIN — MIDAZOLAM HYDROCHLORIDE 2 MG: 1 INJECTION, SOLUTION INTRAMUSCULAR; INTRAVENOUS at 07:56

## 2024-09-23 RX ADMIN — LIDOCAINE HYDROCHLORIDE: 20 INJECTION, SOLUTION INFILTRATION; PERINEURAL at 07:45

## 2024-09-23 RX ADMIN — ASPIRIN 324 MG: 81 TABLET, CHEWABLE ORAL at 07:45

## 2024-09-23 RX ADMIN — HEPARIN SODIUM: 1000 INJECTION, SOLUTION INTRAVENOUS; SUBCUTANEOUS at 07:44

## 2024-09-23 RX ADMIN — FENTANYL CITRATE 50 MCG: 50 INJECTION, SOLUTION INTRAMUSCULAR; INTRAVENOUS at 08:08

## 2024-09-23 RX ADMIN — IOHEXOL 56 ML: 350 INJECTION, SOLUTION INTRAVENOUS at 08:08

## 2024-09-23 RX ADMIN — NITROGLYCERIN 10 ML: 20 INJECTION INTRAVENOUS at 07:45

## 2024-09-23 ASSESSMENT — FIBROSIS 4 INDEX: FIB4 SCORE: 2.47

## 2024-09-23 NOTE — PROGRESS NOTES
[facility] TAVR Review:    Consider a 26mm S3UR from a right femoral approach.  Focal annular and lvot calcification ([facility])  Severe leaflet calcification  Anomalous coronary  Diffuse iliac calcification with moderate tortuosity - consider a 16F Esheath to reduce push/alignment forces  L22, Cra5

## 2024-09-23 NOTE — PROCEDURES
Cardiac Catheterization Laboratory Procedure Note    DATE: 9/23/2024    : David Otto MD, MPH    PROCEDURES PERFORMED:  Coronary angiography  Aortobifemoral angiography  Moderate conscious sedation    INDICATIONS:  Severe aortic stenosis, severe mitral stenosis, pre-TAVR    CONSENT:  The complete alternatives, risks, and benefits of the procedure were explained to the patient. Informed consent was obtained prior to the procedure.    MEDICATIONS:  Lidocaine  Fentanyl  Midazolam  Nitroglycerin  Verapamil  Heparin    MODERATE CONSCIOUS SEDATION:  I personally supervised the administration of moderate conscious sedation by the nursing staff for 17 minutes.  Start time: 7:50 AM  End time: 8:07 AM    CONTRAST: Omnipaque 56 cc    RADIATION DOSE (Air Kerma): 161 mGy    FLUOROSCOPY TIME: 3.5 minutes    ACCESS:  6-Guatemalan Glidesheath in the right radial artery    ESTIMATED BLOOD LOSS: <10 cc    COMPLICATIONS: None    PROCEDURE IN DETAIL:  The patient was brought to the cardiac catheterization laboratory in the fasting state. The skin over the right wrist was prepped and draped in the usual sterile fashion. Lidocaine infiltration was used to anesthetize the tissue over the right radial artery. Using the micropuncture technique, a 6-[place] Glidesheath was inserted in the right radial artery. A 5-Fr Terumo Tiger diagnostic catheter was then advanced over a standard J-wire into the left ventricular cavity where it was gently aspirated, flushed, and then withdrawn across the aortic valve with sequential pressures measured. This catheter was then used to engage the ostium of the left coronary artery and cineangiograms were obtained in multiple projections for complete evaluation of the left coronary system. This catheter was then exchanged over J-wire to 6 Guatemalan JR4 diagnostic catheter which was used to engage the ostium of the right coronary artery and cineangiograms were obtained in multiple projections for complete  evaluation of the right coronary system.  Then this catheter was exchanged over a J-wire to a long pigtail catheter which was used for the aortobifemoral angiography.  Following completion of  angiography, all wires, catheters, and sheaths were removed.  A TR band was placed over the right wrist using the patent hemostasis technique.     HEMODYNAMICS:  Aortic pressure: 90 /62 mmHg    CORONARY ANGIOGRAPHY:  The left main coronary artery : Normal.  Large-caliber vessel that trifurcates to LAD, ramus intermedius and left circumflex.  Ramus intermedius: Diminutive-small in caliber vessel  The left anterior descending coronary artery : Large-caliber vessel with mild CAD (~ 40% prox/mid stenosis), gives rise to small D1 a large D2.  Transapical   The left circumflex coronary artery : Large-caliber dominant vessel that gives rise to small OM1, large OM 2, large OM 3 and a large left PDA.  Mild CAD  The right coronary artery  : Medium caliber nondominant vessel with mild CAD    Aortobifemoral angiography:  Minimal PAD, favorable for TAVR    IMPRESSION:  1.  Mild epicardial CAD, nonobstructive  2.  Mild PAD, favorable for TAVR      NOTIFICATION:  The patient's spouse was called and notified of the results.    Referring Cardiologist - Dr. Eller - was notified.    Cardiothoracic surgeon - Dr. Weinstein - was notified.    David Otto MD, MPH [facility]  Interventional Cardiologist  [facility]   [facility] Clinical Internal [facility] - [facility]

## 2024-09-23 NOTE — DISCHARGE INSTRUCTIONS
POST ANGIOGRAM  General Care Instructions  Maintain a bandage over the incision site for 24 hours.  It's normal to find a small bruise or dime-sized lump at the insertion site. This should disappear within a few weeks.  Do not apply lotions or powders to the site.  Do not immerse the catheter insertion site in water (bathtub/swimming) for five days. It is ok to shower 24 hours after the procedure.  You may resume your normal diet immediately; on the day of your procedure, drink 6-10 glasses of water to help flush the contrast liquid out of your system.  If the doctor inserted the catheter in through your groin:  Walking short distances on a flat surface is OK. Limit going up/down stairs for the first 2 days.  DO NOT do yard work, drive, squat, lift heavy objects, or play sports for 2 days; or until your health care provider tells you it is OK.  If the doctor inserted the catheter in your arm:  For 3 days, DO NOT lift anything heavier than 10 pounds (approximately a gallon of milk). DO NOT do any heavy pushing, pulling, or twisting.    Medications  If your current medications need to be changed, you will be provided with an updated list of your medications prior to discharge.  If you take warfarin (Coumadin), resume taking your usual dose the evening after the procedure.  DO NOT STOP taking prescribed blood thinning (anti-platelet) medications unless instructed by your cardiologist.  These medications include:  Aspirin, Clopidogrel (Plavix), Ticagrelor (Brilinta), or Prasugrel (Effient)   If you take one of the following anticoagulants, RESUME 24 HOURS after your procedure:  Apixiban (Eliquis), Rivaroxaban (Xarelto), Dabigatran (Pradaxa), Edoxaban (Savaysa)  If you take metformin (Glucophage), RESUME 48 HOURS after your procedure.    When to call your healthcare provider  Call your cardiologist right away at [phone] if you have any of the following:   Problems/Concerns taking any of your prescribed heart  medicines.   The insertion site has increasing pain, swelling, redness, bleeding, or drainage.   Your arm or leg below where the insertion site changes color, is cool, or is numb.   You have chest pain or shortness of breath that does not go away with rest.   Your pulse feels irregular -- very slow (less than 60 beats/minute) or very fast (over 100 beats/minute).   You have dizziness, fainting, or you are very tired.   You are coughing up blood or yellow or green mucus.   You have chills or a fever over 101°F (38.3°C).    If there is bleeding at the catheter insertion site, apply pressure for 10 minutes.  If bleeding persists, call 911, and continue to hold pressure until advanced medical support arrives.        Exercising Safely After Percutaneous Coronary Intervention (PCI)  After percutaneous coronary intervention (PCI), which involves angioplasty and often stenting, it's important to focus on your heart health. Exercise can help strengthen your heart. It can also help you feel good and improve your overall health. Talk with your health care provider or cardiac rehab team member about good options for you.  Start slowly. Work up to more vigorous exercise as you get stronger. Aim for at least 150 minutes of exercise each week.  Include aerobic activities. These make the heart beat faster. They work the heart and lungs, and improve the body's ability to use oxygen. Good choices include walking, swimming, and biking .  Always follow your doctor's recommendation for exercise.   You have been referred to cardiac rehabilitation, which is important for your recovery.  You may contact [facility] at [phone] to learn more and schedule a visit.        Lifestyle Management After Percutaneous Coronary Intervention (PCI)  Percutaneous coronary intervention (PCI)  involves angioplasty and often stenting. This procedure can open arteries and relieve symptoms. But, it doesn't cure coronary artery  disease. New blockages can still form. You need to take steps to prevent this by managing risk factors. Doing so will help make your heart and arteries healthier. Your doctor may prescribe cardiac rehabilitation to help with this lifelong process.  Understanding risk factors  Some risk factors for coronary artery disease can be controlled. These include smoking, high blood pressure, cholesterol, diabetes, and obesity. They can be managed with medication, diet, and exercise. Support and counseling can also play a role. The effort will pay off! Managing risk factors can help you be more active, feel better, and reduce the risk of heart attack.    If you smoke, quit!  If your doctor has been urging you to quit smoking, it's for good reasons. Smoking damages your heart, blood vessels, and lungs. The good news is that quitting can halt or even reverse the damage of smoking. To quit now:  Get medical help. Ask your doctor for advice on stop-smoking programs. Also ask about medications or nicotine replacement therapy products that may help you quit smoking.  Get support. Join a support group. Ask for help from your family and friends.  Don't give up. It often takes several tries to succeed in quitting smoking.  Avoid secondhand smoke. Ask family and friends not to smoke around you.

## 2024-09-23 NOTE — PROGRESS NOTES
PPU NOTE:  RECEIVED REPORT FROM AMANDA JACOBO at 0838, TR BAND IN PLACED AND REMOVED AIR STARTED AT 0917, PT ON 4L NC AND SLOWLY TURNED OFF, PT OXYGEN 89-92 ON RA WITH ONE LOW BP 67SBP, EVERN WITH WALKING AROUND UNIT X1, PT NOT SLEEPING WELL, MD AWARE AND GOOD WITH D/C, D/C PAPERWORK WENT OVER WITH DAUGHTER AND WIFE, QUESTIONS ANSWERED, DISCUSSED PRIMARY MD ADDRESSING HOME OXYGEN OR AFTER TAVR.

## 2024-09-25 ENCOUNTER — DOCUMENTATION (OUTPATIENT)
Dept: CARDIOLOGY | Facility: MEDICAL CENTER | Age: 79
End: 2024-09-25

## 2024-09-25 ENCOUNTER — TELEPHONE (OUTPATIENT)
Dept: CARDIOLOGY | Facility: MEDICAL CENTER | Age: 79
End: 2024-09-25

## 2024-09-25 ENCOUNTER — PRE-ADMISSION TESTING (OUTPATIENT)
Dept: ADMISSIONS | Facility: MEDICAL CENTER | Age: 79
End: 2024-09-25
Attending: INTERNAL MEDICINE
Payer: MEDICARE

## 2024-09-25 DIAGNOSIS — I35.0 SEVERE AORTIC STENOSIS: ICD-10-CM

## 2024-09-25 DIAGNOSIS — Z01.812 PRE-OPERATIVE LABORATORY EXAMINATION: ICD-10-CM

## 2024-09-25 LAB
ABO GROUP BLD: NORMAL
BLD GP AB SCN SERPL QL: NORMAL
EST. AVERAGE GLUCOSE BLD GHB EST-MCNC: 146 MG/DL
HBA1C MFR BLD: 6.7 % (ref 4–5.6)
RH BLD: NORMAL

## 2024-09-25 PROCEDURE — 86900 BLOOD TYPING SEROLOGIC ABO: CPT

## 2024-09-25 PROCEDURE — 86850 RBC ANTIBODY SCREEN: CPT

## 2024-09-25 PROCEDURE — 83036 HEMOGLOBIN GLYCOSYLATED A1C: CPT

## 2024-09-25 PROCEDURE — 36415 COLL VENOUS BLD VENIPUNCTURE: CPT

## 2024-09-25 PROCEDURE — 86901 BLOOD TYPING SEROLOGIC RH(D): CPT

## 2024-09-25 NOTE — TELEPHONE ENCOUNTER
Patient and wife Ely notified of procedure date/time and check in process.     All questions were answered. Patient has direct extension for any further questions/concerns prior to the procedure.

## 2024-09-25 NOTE — PROGRESS NOTES
Thank you for the opportunity to participate in your patient's care.     Your patient is scheduled for transcatheter aortic valve replacement (TAVR) on 9/30/2024    Sincerely,    [facility]    HARVEY Bravo, RN, Structural Heart Program Nurse Coordinator ([phone])  HARVEY Rogers, RN, Structural Heart Program Nurse Coordinator ([phone])

## 2024-09-25 NOTE — TELEPHONE ENCOUNTER
[facility] form signed and faxed to [employer] per pt request. Form and confirmation fax receipt scanned into [employer].

## 2024-09-25 NOTE — PROGRESS NOTES
Pre TAVR notification letter electronically faxed to PCP Dr. Francois Penaloza [phone]   The patient is a 32y Female complaining of shortness of breath. The patient is a 32y Female complaining of weakness

## 2024-09-25 NOTE — TELEPHONE ENCOUNTER
Valve Conference Plan of Care: 9/25/2024 for TAVR 9/30/2024           TAVR Candidate: yes  Access: left femoral  Valve Size: 26mm  General Anesthesia or MAC: GA  Unit: telemetry  Incidental findings to be discussed with PCP: sent to PCP: Dr. Penaloza (not in Epic)  Multiple mildly enlarged mediastinal lymph nodes. The largest is in the subcarinal region, measuring 2.3 x 3.0 cm  Calcification of the gallbladder wall. Cholelithiasis.  Moderate bilateral pleural effusions.   Clearance needed prior to procedure: no    Check in time of 0700 9/30/2024.     Pre-op appointment completed: scheduled today 9/25/2024    NPO after midnight. Hold vitamins/supplements x7 days, hold fenofibrate/losartan-HCTZ x24hrs, hold metformin/furosemide AM of procedure.

## 2024-09-25 NOTE — TELEPHONE ENCOUNTER
Called and spoke to patient's wife Ely. They are currently at pre-admit, she is agreeable to me calling back in about an hour.

## 2024-09-30 ENCOUNTER — ANESTHESIA EVENT (OUTPATIENT)
Dept: SURGERY | Facility: MEDICAL CENTER | Age: 79
End: 2024-09-30
Payer: MEDICARE

## 2024-09-30 ENCOUNTER — HOSPITAL ENCOUNTER (INPATIENT)
Facility: MEDICAL CENTER | Age: 79
End: 2024-09-30
Attending: INTERNAL MEDICINE | Admitting: INTERNAL MEDICINE
Payer: MEDICARE

## 2024-09-30 ENCOUNTER — APPOINTMENT (OUTPATIENT)
Dept: RADIOLOGY | Facility: MEDICAL CENTER | Age: 79
DRG: 266 | End: 2024-09-30
Attending: INTERNAL MEDICINE
Payer: MEDICARE

## 2024-09-30 ENCOUNTER — APPOINTMENT (OUTPATIENT)
Dept: CARDIOLOGY | Facility: MEDICAL CENTER | Age: 79
DRG: 266 | End: 2024-09-30
Attending: ANESTHESIOLOGY
Payer: MEDICARE

## 2024-09-30 ENCOUNTER — APPOINTMENT (OUTPATIENT)
Dept: RADIOLOGY | Facility: MEDICAL CENTER | Age: 79
DRG: 266 | End: 2024-09-30
Attending: NURSE PRACTITIONER
Payer: MEDICARE

## 2024-09-30 ENCOUNTER — ANESTHESIA (OUTPATIENT)
Dept: SURGERY | Facility: MEDICAL CENTER | Age: 79
End: 2024-09-30
Payer: MEDICARE

## 2024-09-30 VITALS
BODY MASS INDEX: 26.04 KG/M2 | SYSTOLIC BLOOD PRESSURE: 85 MMHG | HEART RATE: 72 BPM | HEIGHT: 70 IN | TEMPERATURE: 99 F | WEIGHT: 181.88 LBS | DIASTOLIC BLOOD PRESSURE: 51 MMHG | RESPIRATION RATE: 22 BRPM | OXYGEN SATURATION: 96 %

## 2024-09-30 DIAGNOSIS — L53.9 ERYTHEMA OF GROIN: ICD-10-CM

## 2024-09-30 DIAGNOSIS — I50.33 ACUTE ON CHRONIC DIASTOLIC HEART FAILURE DUE TO VALVULAR DISEASE (HCC): ICD-10-CM

## 2024-09-30 DIAGNOSIS — Z95.2 S/P TAVR (TRANSCATHETER AORTIC VALVE REPLACEMENT): ICD-10-CM

## 2024-09-30 DIAGNOSIS — J96.21 ACUTE ON CHRONIC RESPIRATORY FAILURE WITH HYPOXIA (HCC): ICD-10-CM

## 2024-09-30 DIAGNOSIS — I38 ACUTE ON CHRONIC DIASTOLIC HEART FAILURE DUE TO VALVULAR DISEASE (HCC): ICD-10-CM

## 2024-09-30 PROBLEM — I25.10 CORONARY ARTERY DISEASE INVOLVING NATIVE CORONARY ARTERY OF NATIVE HEART WITHOUT ANGINA PECTORIS: Status: ACTIVE | Noted: 2024-09-30

## 2024-09-30 PROBLEM — R35.1 NOCTURIA: Status: ACTIVE | Noted: 2024-06-17

## 2024-09-30 PROBLEM — I35.0 SEVERE AORTIC STENOSIS: Chronic | Status: RESOLVED | Noted: 2024-08-20 | Resolved: 2024-09-30

## 2024-09-30 PROBLEM — R79.89 ELEVATED TROPONIN: Status: RESOLVED | Noted: 2024-08-19 | Resolved: 2024-09-30

## 2024-09-30 PROBLEM — R09.02 HYPOXEMIA: Status: ACTIVE | Noted: 2024-09-30

## 2024-09-30 PROBLEM — Z85.46 HISTORY OF MALIGNANT NEOPLASM OF PROSTATE: Status: ACTIVE | Noted: 2024-05-06

## 2024-09-30 PROBLEM — I34.0 NONRHEUMATIC MITRAL VALVE REGURGITATION: Status: RESOLVED | Noted: 2024-08-19 | Resolved: 2024-09-30

## 2024-09-30 PROBLEM — J96.01 ACUTE RESPIRATORY FAILURE WITH HYPOXIA (HCC): Status: RESOLVED | Noted: 2024-08-19 | Resolved: 2024-09-30

## 2024-09-30 PROBLEM — R94.30 ELEVATED LEFT VENTRICULAR END-DIASTOLIC PRESSURE (LVEDP): Status: ACTIVE | Noted: 2024-09-30

## 2024-09-30 PROBLEM — E87.29 HIGH ANION GAP METABOLIC ACIDOSIS: Status: RESOLVED | Noted: 2024-08-19 | Resolved: 2024-09-30

## 2024-09-30 LAB
ABO + RH BLD: NORMAL
ACT BLD: 263 SEC (ref 74–137)
ALBUMIN SERPL BCP-MCNC: 3.3 G/DL (ref 3.2–4.9)
ALBUMIN/GLOB SERPL: 1.2 G/DL
ALP SERPL-CCNC: 47 U/L (ref 30–99)
ALT SERPL-CCNC: 24 U/L (ref 2–50)
ANION GAP SERPL CALC-SCNC: 12 MMOL/L (ref 7–16)
ARTERIAL PATENCY WRIST A: ABNORMAL
AST SERPL-CCNC: 34 U/L (ref 12–45)
BASE EXCESS BLDA CALC-SCNC: -3 MMOL/L (ref -4–3)
BASOPHILS # BLD AUTO: 0.3 % (ref 0–1.8)
BASOPHILS # BLD: 0.04 K/UL (ref 0–0.12)
BILIRUB SERPL-MCNC: 0.4 MG/DL (ref 0.1–1.5)
BODY TEMPERATURE: ABNORMAL DEGREES
BREATHS SETTING VENT: 22
BUN SERPL-MCNC: 28 MG/DL (ref 8–22)
CALCIUM ALBUM COR SERPL-MCNC: 9.3 MG/DL (ref 8.5–10.5)
CALCIUM SERPL-MCNC: 8.7 MG/DL (ref 8.5–10.5)
CHLORIDE SERPL-SCNC: 108 MMOL/L (ref 96–112)
CO2 BLDA-SCNC: 24 MMOL/L (ref 20–33)
CO2 SERPL-SCNC: 20 MMOL/L (ref 20–33)
CREAT SERPL-MCNC: 1.13 MG/DL (ref 0.5–1.4)
DELSYS IDSYS: ABNORMAL
EKG IMPRESSION: NORMAL
END TIDAL CARBON DIOXIDE IECO2: 34 MMHG
EOSINOPHIL # BLD AUTO: 0.06 K/UL (ref 0–0.51)
EOSINOPHIL NFR BLD: 0.4 % (ref 0–6.9)
ERYTHROCYTE [DISTWIDTH] IN BLOOD BY AUTOMATED COUNT: 51.6 FL (ref 35.9–50)
GFR SERPLBLD CREATININE-BSD FMLA CKD-EPI: 65 ML/MIN/1.73 M 2
GLOBULIN SER CALC-MCNC: 2.8 G/DL (ref 1.9–3.5)
GLUCOSE BLD STRIP.AUTO-MCNC: 144 MG/DL (ref 65–99)
GLUCOSE SERPL-MCNC: 157 MG/DL (ref 65–99)
HCO3 BLDA-SCNC: 22.8 MMOL/L (ref 17–25)
HCT VFR BLD AUTO: 35.7 % (ref 42–52)
HGB BLD-MCNC: 11.6 G/DL (ref 14–18)
HOROWITZ INDEX BLDA+IHG-RTO: 142 MM[HG]
IMM GRANULOCYTES # BLD AUTO: 0.18 K/UL (ref 0–0.11)
IMM GRANULOCYTES NFR BLD AUTO: 1.2 % (ref 0–0.9)
INR PPP: 1.17 (ref 0.87–1.13)
LACTATE BLD-SCNC: 0.8 MMOL/L (ref 0.5–2)
LYMPHOCYTES # BLD AUTO: 0.55 K/UL (ref 1–4.8)
LYMPHOCYTES NFR BLD: 3.7 % (ref 22–41)
MAGNESIUM SERPL-MCNC: 2 MG/DL (ref 1.5–2.5)
MCH RBC QN AUTO: 30.4 PG (ref 27–33)
MCHC RBC AUTO-ENTMCNC: 32.5 G/DL (ref 32.3–36.5)
MCV RBC AUTO: 93.5 FL (ref 81.4–97.8)
MODE IMODE: ABNORMAL
MONOCYTES # BLD AUTO: 1.09 K/UL (ref 0–0.85)
MONOCYTES NFR BLD AUTO: 7.3 % (ref 0–13.4)
NEUTROPHILS # BLD AUTO: 12.99 K/UL (ref 1.82–7.42)
NEUTROPHILS NFR BLD: 87.1 % (ref 44–72)
NRBC # BLD AUTO: 0 K/UL
NRBC BLD-RTO: 0 /100 WBC (ref 0–0.2)
NT-PROBNP SERPL IA-MCNC: 3156 PG/ML (ref 0–125)
O2/TOTAL GAS SETTING VFR VENT: 100 %
PCO2 BLDA: 42.4 MMHG (ref 26–37)
PCO2 TEMP ADJ BLDA: 39.9 MMHG (ref 26–37)
PEEP END EXPIRATORY PRESSURE IPEEP: 10 CMH20
PH BLDA: 7.34 [PH] (ref 7.4–7.5)
PH TEMP ADJ BLDA: 7.36 [PH] (ref 7.4–7.5)
PHOSPHATE SERPL-MCNC: 4.2 MG/DL (ref 2.5–4.5)
PLATELET # BLD AUTO: 198 K/UL (ref 164–446)
PMV BLD AUTO: 10.8 FL (ref 9–12.9)
PO2 BLDA: 142 MMHG (ref 64–87)
PO2 TEMP ADJ BLDA: 134 MMHG (ref 64–87)
POTASSIUM SERPL-SCNC: 3.8 MMOL/L (ref 3.6–5.5)
PROT SERPL-MCNC: 6.1 G/DL (ref 6–8.2)
PROTHROMBIN TIME: 14.9 SEC (ref 12–14.6)
RBC # BLD AUTO: 3.82 M/UL (ref 4.7–6.1)
SAO2 % BLDA: 99 % (ref 93–99)
SODIUM SERPL-SCNC: 140 MMOL/L (ref 135–145)
SPECIMEN DRAWN FROM PATIENT: ABNORMAL
TIDAL VOLUME IVT: 440 ML
WBC # BLD AUTO: 14.9 K/UL (ref 4.8–10.8)

## 2024-09-30 PROCEDURE — 93010 ELECTROCARDIOGRAM REPORT: CPT | Mod: 59 | Performed by: INTERNAL MEDICINE

## 2024-09-30 PROCEDURE — 700111 HCHG RX REV CODE 636 W/ 250 OVERRIDE (IP): Mod: JZ | Performed by: INTERNAL MEDICINE

## 2024-09-30 PROCEDURE — 700105 HCHG RX REV CODE 258: Performed by: INTERNAL MEDICINE

## 2024-09-30 PROCEDURE — 160009 HCHG ANES TIME/MIN: Performed by: INTERNAL MEDICINE

## 2024-09-30 PROCEDURE — 83735 ASSAY OF MAGNESIUM: CPT

## 2024-09-30 PROCEDURE — 160031 HCHG SURGERY MINUTES - 1ST 30 MINS LEVEL 5: Performed by: INTERNAL MEDICINE

## 2024-09-30 PROCEDURE — 93005 ELECTROCARDIOGRAM TRACING: CPT | Performed by: NURSE PRACTITIONER

## 2024-09-30 PROCEDURE — 503001 HCHG PERFUSION: Performed by: INTERNAL MEDICINE

## 2024-09-30 PROCEDURE — 99292 CRITICAL CARE ADDL 30 MIN: CPT | Performed by: INTERNAL MEDICINE

## 2024-09-30 PROCEDURE — 86901 BLOOD TYPING SEROLOGIC RH(D): CPT

## 2024-09-30 PROCEDURE — 700111 HCHG RX REV CODE 636 W/ 250 OVERRIDE (IP): Performed by: INTERNAL MEDICINE

## 2024-09-30 PROCEDURE — 36415 COLL VENOUS BLD VENIPUNCTURE: CPT

## 2024-09-30 PROCEDURE — 71045 X-RAY EXAM CHEST 1 VIEW: CPT

## 2024-09-30 PROCEDURE — C1887 CATHETER, GUIDING: HCPCS | Performed by: INTERNAL MEDICINE

## 2024-09-30 PROCEDURE — C1760 CLOSURE DEV, VASC: HCPCS | Performed by: INTERNAL MEDICINE

## 2024-09-30 PROCEDURE — 700102 HCHG RX REV CODE 250 W/ 637 OVERRIDE(OP): Performed by: NURSE PRACTITIONER

## 2024-09-30 PROCEDURE — 160042 HCHG SURGERY MINUTES - EA ADDL 1 MIN LEVEL 5: Performed by: INTERNAL MEDICINE

## 2024-09-30 PROCEDURE — 160048 HCHG OR STATISTICAL LEVEL 1-5: Performed by: INTERNAL MEDICINE

## 2024-09-30 PROCEDURE — 85610 PROTHROMBIN TIME: CPT

## 2024-09-30 PROCEDURE — 83605 ASSAY OF LACTIC ACID: CPT

## 2024-09-30 PROCEDURE — C1894 INTRO/SHEATH, NON-LASER: HCPCS | Performed by: INTERNAL MEDICINE

## 2024-09-30 PROCEDURE — 93355 ECHO TRANSESOPHAGEAL (TEE): CPT

## 2024-09-30 PROCEDURE — 700102 HCHG RX REV CODE 250 W/ 637 OVERRIDE(OP): Performed by: INTERNAL MEDICINE

## 2024-09-30 PROCEDURE — 700101 HCHG RX REV CODE 250: Performed by: ANESTHESIOLOGY

## 2024-09-30 PROCEDURE — C1751 CATH, INF, PER/CENT/MIDLINE: HCPCS | Performed by: INTERNAL MEDICINE

## 2024-09-30 PROCEDURE — 80053 COMPREHEN METABOLIC PANEL: CPT

## 2024-09-30 PROCEDURE — 94002 VENT MGMT INPAT INIT DAY: CPT

## 2024-09-30 PROCEDURE — 700111 HCHG RX REV CODE 636 W/ 250 OVERRIDE (IP): Performed by: ANESTHESIOLOGY

## 2024-09-30 PROCEDURE — 99291 CRITICAL CARE FIRST HOUR: CPT | Performed by: INTERNAL MEDICINE

## 2024-09-30 PROCEDURE — 33361 REPLACE AORTIC VALVE PERQ: CPT | Mod: 62,Q0 | Performed by: INTERNAL MEDICINE

## 2024-09-30 PROCEDURE — 82803 BLOOD GASES ANY COMBINATION: CPT

## 2024-09-30 PROCEDURE — 83880 ASSAY OF NATRIURETIC PEPTIDE: CPT

## 2024-09-30 PROCEDURE — 84100 ASSAY OF PHOSPHORUS: CPT

## 2024-09-30 PROCEDURE — 110372 HCHG SHELL REV 278: Performed by: INTERNAL MEDICINE

## 2024-09-30 PROCEDURE — 700117 HCHG RX CONTRAST REV CODE 255: Performed by: INTERNAL MEDICINE

## 2024-09-30 PROCEDURE — 85347 COAGULATION TIME ACTIVATED: CPT

## 2024-09-30 PROCEDURE — C1725 CATH, TRANSLUMIN NON-LASER: HCPCS | Performed by: INTERNAL MEDICINE

## 2024-09-30 PROCEDURE — 700105 HCHG RX REV CODE 258: Performed by: ANESTHESIOLOGY

## 2024-09-30 PROCEDURE — 86900 BLOOD TYPING SEROLOGIC ABO: CPT

## 2024-09-30 PROCEDURE — 02RF38Z REPLACEMENT OF AORTIC VALVE WITH ZOOPLASTIC TISSUE, PERCUTANEOUS APPROACH: ICD-10-PCS | Performed by: INTERNAL MEDICINE

## 2024-09-30 PROCEDURE — 94003 VENT MGMT INPAT SUBQ DAY: CPT

## 2024-09-30 PROCEDURE — 85025 COMPLETE CBC W/AUTO DIFF WBC: CPT

## 2024-09-30 PROCEDURE — C1769 GUIDE WIRE: HCPCS | Performed by: INTERNAL MEDICINE

## 2024-09-30 PROCEDURE — 770022 HCHG ROOM/CARE - ICU (200)

## 2024-09-30 PROCEDURE — 33361 REPLACE AORTIC VALVE PERQ: CPT | Mod: 62,Q0 | Performed by: THORACIC SURGERY (CARDIOTHORACIC VASCULAR SURGERY)

## 2024-09-30 PROCEDURE — 94799 UNLISTED PULMONARY SVC/PX: CPT

## 2024-09-30 PROCEDURE — 82962 GLUCOSE BLOOD TEST: CPT | Mod: 91

## 2024-09-30 PROCEDURE — A9270 NON-COVERED ITEM OR SERVICE: HCPCS | Performed by: INTERNAL MEDICINE

## 2024-09-30 PROCEDURE — C1883 ADAPT/EXT, PACING/NEURO LEAD: HCPCS | Performed by: INTERNAL MEDICINE

## 2024-09-30 PROCEDURE — 700101 HCHG RX REV CODE 250: Performed by: INTERNAL MEDICINE

## 2024-09-30 PROCEDURE — 37799 UNLISTED PX VASCULAR SURGERY: CPT

## 2024-09-30 PROCEDURE — B24BZZ4 ULTRASONOGRAPHY OF HEART WITH AORTA, TRANSESOPHAGEAL: ICD-10-PCS | Performed by: INTERNAL MEDICINE

## 2024-09-30 PROCEDURE — A9270 NON-COVERED ITEM OR SERVICE: HCPCS | Performed by: NURSE PRACTITIONER

## 2024-09-30 DEVICE — IMPLANTABLE DEVICE: Type: IMPLANTABLE DEVICE | Site: HEART | Status: FUNCTIONAL

## 2024-09-30 RX ORDER — AMLODIPINE BESYLATE 10 MG/1
10 TABLET ORAL EVERY EVENING
Status: DISCONTINUED | OUTPATIENT
Start: 2024-09-30 | End: 2024-10-01

## 2024-09-30 RX ORDER — DIPHENHYDRAMINE HYDROCHLORIDE 50 MG/ML
25 INJECTION INTRAMUSCULAR; INTRAVENOUS EVERY 6 HOURS PRN
Status: DISCONTINUED | OUTPATIENT
Start: 2024-09-30 | End: 2024-10-02 | Stop reason: HOSPADM

## 2024-09-30 RX ORDER — POLYETHYLENE GLYCOL 3350 17 G/17G
1 POWDER, FOR SOLUTION ORAL
Status: DISCONTINUED | OUTPATIENT
Start: 2024-09-30 | End: 2024-10-01

## 2024-09-30 RX ORDER — FUROSEMIDE 10 MG/ML
40 INJECTION INTRAMUSCULAR; INTRAVENOUS
Status: DISCONTINUED | OUTPATIENT
Start: 2024-09-30 | End: 2024-09-30

## 2024-09-30 RX ORDER — LOSARTAN POTASSIUM 50 MG/1
100 TABLET ORAL
Status: DISCONTINUED | OUTPATIENT
Start: 2024-10-01 | End: 2024-10-01

## 2024-09-30 RX ORDER — HYDROMORPHONE HYDROCHLORIDE 1 MG/ML
0.4 INJECTION, SOLUTION INTRAMUSCULAR; INTRAVENOUS; SUBCUTANEOUS
Status: DISCONTINUED | OUTPATIENT
Start: 2024-09-30 | End: 2024-09-30

## 2024-09-30 RX ORDER — NYSTATIN 100000 [USP'U]/G
POWDER TOPICAL 2 TIMES DAILY
Status: DISCONTINUED | OUTPATIENT
Start: 2024-09-30 | End: 2024-10-02 | Stop reason: HOSPADM

## 2024-09-30 RX ORDER — ACETAMINOPHEN 325 MG/1
650 TABLET ORAL EVERY 6 HOURS PRN
Status: DISCONTINUED | OUTPATIENT
Start: 2024-09-30 | End: 2024-10-01

## 2024-09-30 RX ORDER — HYDROCHLOROTHIAZIDE 25 MG/1
12.5 TABLET ORAL
Status: DISCONTINUED | OUTPATIENT
Start: 2024-10-01 | End: 2024-10-01

## 2024-09-30 RX ORDER — DEXTROSE MONOHYDRATE 25 G/50ML
25 INJECTION, SOLUTION INTRAVENOUS
Status: DISCONTINUED | OUTPATIENT
Start: 2024-09-30 | End: 2024-10-01

## 2024-09-30 RX ORDER — DEXTROSE MONOHYDRATE 25 G/50ML
25 INJECTION, SOLUTION INTRAVENOUS
Status: DISCONTINUED | OUTPATIENT
Start: 2024-09-30 | End: 2024-09-30

## 2024-09-30 RX ORDER — SODIUM CHLORIDE 9 MG/ML
INJECTION, SOLUTION INTRAVENOUS CONTINUOUS
Status: DISCONTINUED | OUTPATIENT
Start: 2024-09-30 | End: 2024-09-30

## 2024-09-30 RX ORDER — BISACODYL 10 MG
10 SUPPOSITORY, RECTAL RECTAL
Status: DISCONTINUED | OUTPATIENT
Start: 2024-09-30 | End: 2024-10-01

## 2024-09-30 RX ORDER — PRAVASTATIN SODIUM 20 MG
40 TABLET ORAL NIGHTLY
Status: DISCONTINUED | OUTPATIENT
Start: 2024-09-30 | End: 2024-10-01

## 2024-09-30 RX ORDER — PROTAMINE SULFATE 10 MG/ML
INJECTION, SOLUTION INTRAVENOUS PRN
Status: DISCONTINUED | OUTPATIENT
Start: 2024-09-30 | End: 2024-09-30 | Stop reason: SURG

## 2024-09-30 RX ORDER — ACETAMINOPHEN 500 MG
1000 TABLET ORAL EVERY 6 HOURS PRN
COMMUNITY

## 2024-09-30 RX ORDER — AMOXICILLIN 250 MG
2 CAPSULE ORAL 2 TIMES DAILY
Status: DISCONTINUED | OUTPATIENT
Start: 2024-09-30 | End: 2024-10-01

## 2024-09-30 RX ORDER — ALBUTEROL SULFATE 5 MG/ML
2.5 SOLUTION RESPIRATORY (INHALATION)
Status: DISCONTINUED | OUTPATIENT
Start: 2024-09-30 | End: 2024-10-01

## 2024-09-30 RX ORDER — HEPARIN SODIUM,PORCINE 1000/ML
VIAL (ML) INJECTION PRN
Status: DISCONTINUED | OUTPATIENT
Start: 2024-09-30 | End: 2024-09-30 | Stop reason: SURG

## 2024-09-30 RX ORDER — OXYCODONE HCL 5 MG/5 ML
10 SOLUTION, ORAL ORAL
Status: DISCONTINUED | OUTPATIENT
Start: 2024-09-30 | End: 2024-09-30

## 2024-09-30 RX ORDER — HYDRALAZINE HYDROCHLORIDE 20 MG/ML
5 INJECTION INTRAMUSCULAR; INTRAVENOUS
Status: DISCONTINUED | OUTPATIENT
Start: 2024-09-30 | End: 2024-09-30

## 2024-09-30 RX ORDER — OXYCODONE HCL 5 MG/5 ML
5 SOLUTION, ORAL ORAL
Status: DISCONTINUED | OUTPATIENT
Start: 2024-09-30 | End: 2024-09-30

## 2024-09-30 RX ORDER — SODIUM PHOSPHATE,MONO-DIBASIC 19G-7G/118
1 ENEMA (ML) RECTAL
Status: DISCONTINUED | OUTPATIENT
Start: 2024-09-30 | End: 2024-09-30

## 2024-09-30 RX ORDER — MEPERIDINE HYDROCHLORIDE 25 MG/ML
12.5 INJECTION INTRAMUSCULAR; INTRAVENOUS; SUBCUTANEOUS
Status: DISCONTINUED | OUTPATIENT
Start: 2024-09-30 | End: 2024-09-30

## 2024-09-30 RX ORDER — SODIUM CHLORIDE, SODIUM LACTATE, POTASSIUM CHLORIDE, CALCIUM CHLORIDE 600; 310; 30; 20 MG/100ML; MG/100ML; MG/100ML; MG/100ML
INJECTION, SOLUTION INTRAVENOUS CONTINUOUS
Status: DISCONTINUED | OUTPATIENT
Start: 2024-09-30 | End: 2024-09-30

## 2024-09-30 RX ORDER — ENOXAPARIN SODIUM 100 MG/ML
40 INJECTION SUBCUTANEOUS DAILY
Status: DISCONTINUED | OUTPATIENT
Start: 2024-09-30 | End: 2024-10-01

## 2024-09-30 RX ORDER — IBUPROFEN 200 MG
400 TABLET ORAL EVERY 8 HOURS PRN
Status: ON HOLD | COMMUNITY
End: 2024-10-02

## 2024-09-30 RX ORDER — LOSARTAN POTASSIUM AND HYDROCHLOROTHIAZIDE 12.5; 1 MG/1; MG/1
1 TABLET ORAL DAILY
Status: DISCONTINUED | OUTPATIENT
Start: 2024-09-30 | End: 2024-09-30

## 2024-09-30 RX ORDER — EPHEDRINE SULFATE 50 MG/ML
INJECTION, SOLUTION INTRAVENOUS PRN
Status: DISCONTINUED | OUTPATIENT
Start: 2024-09-30 | End: 2024-09-30 | Stop reason: SURG

## 2024-09-30 RX ORDER — AMOXICILLIN 250 MG
1 CAPSULE ORAL NIGHTLY
Status: DISCONTINUED | OUTPATIENT
Start: 2024-09-30 | End: 2024-09-30

## 2024-09-30 RX ORDER — ONDANSETRON 2 MG/ML
INJECTION INTRAMUSCULAR; INTRAVENOUS PRN
Status: DISCONTINUED | OUTPATIENT
Start: 2024-09-30 | End: 2024-09-30

## 2024-09-30 RX ORDER — DOCUSATE SODIUM 100 MG/1
100 CAPSULE, LIQUID FILLED ORAL 2 TIMES DAILY
Status: DISCONTINUED | OUTPATIENT
Start: 2024-09-30 | End: 2024-09-30

## 2024-09-30 RX ORDER — CEFAZOLIN SODIUM 1 G/3ML
INJECTION, POWDER, FOR SOLUTION INTRAMUSCULAR; INTRAVENOUS PRN
Status: DISCONTINUED | OUTPATIENT
Start: 2024-09-30 | End: 2024-09-30 | Stop reason: SURG

## 2024-09-30 RX ORDER — DIPHENHYDRAMINE HYDROCHLORIDE 50 MG/ML
12.5 INJECTION INTRAMUSCULAR; INTRAVENOUS
Status: DISCONTINUED | OUTPATIENT
Start: 2024-09-30 | End: 2024-09-30

## 2024-09-30 RX ORDER — ONDANSETRON 2 MG/ML
4 INJECTION INTRAMUSCULAR; INTRAVENOUS EVERY 4 HOURS PRN
Status: DISCONTINUED | OUTPATIENT
Start: 2024-09-30 | End: 2024-10-02 | Stop reason: HOSPADM

## 2024-09-30 RX ORDER — LIDOCAINE HYDROCHLORIDE 40 MG/ML
SOLUTION TOPICAL PRN
Status: DISCONTINUED | OUTPATIENT
Start: 2024-09-30 | End: 2024-09-30 | Stop reason: SURG

## 2024-09-30 RX ORDER — HYDRALAZINE HYDROCHLORIDE 20 MG/ML
10 INJECTION INTRAMUSCULAR; INTRAVENOUS
Status: DISCONTINUED | OUTPATIENT
Start: 2024-09-30 | End: 2024-10-01

## 2024-09-30 RX ORDER — FUROSEMIDE 10 MG/ML
40 INJECTION INTRAMUSCULAR; INTRAVENOUS
Status: DISCONTINUED | OUTPATIENT
Start: 2024-10-01 | End: 2024-10-01

## 2024-09-30 RX ORDER — AMOXICILLIN 250 MG
1 CAPSULE ORAL
Status: DISCONTINUED | OUTPATIENT
Start: 2024-09-30 | End: 2024-09-30

## 2024-09-30 RX ORDER — HYDROMORPHONE HYDROCHLORIDE 1 MG/ML
0.2 INJECTION, SOLUTION INTRAMUSCULAR; INTRAVENOUS; SUBCUTANEOUS
Status: DISCONTINUED | OUTPATIENT
Start: 2024-09-30 | End: 2024-09-30

## 2024-09-30 RX ORDER — HYDROMORPHONE HYDROCHLORIDE 1 MG/ML
0.1 INJECTION, SOLUTION INTRAMUSCULAR; INTRAVENOUS; SUBCUTANEOUS
Status: DISCONTINUED | OUTPATIENT
Start: 2024-09-30 | End: 2024-09-30

## 2024-09-30 RX ORDER — IPRATROPIUM BROMIDE AND ALBUTEROL SULFATE 2.5; .5 MG/3ML; MG/3ML
3 SOLUTION RESPIRATORY (INHALATION)
Status: DISCONTINUED | OUTPATIENT
Start: 2024-09-30 | End: 2024-10-01

## 2024-09-30 RX ORDER — FAMOTIDINE 20 MG/1
20 TABLET, FILM COATED ORAL EVERY 12 HOURS
Status: DISCONTINUED | OUTPATIENT
Start: 2024-09-30 | End: 2024-10-01

## 2024-09-30 RX ORDER — BISACODYL 10 MG
10 SUPPOSITORY, RECTAL RECTAL
Status: DISCONTINUED | OUTPATIENT
Start: 2024-09-30 | End: 2024-09-30

## 2024-09-30 RX ORDER — MIRABEGRON 50 MG/1
50 TABLET, FILM COATED, EXTENDED RELEASE ORAL EVERY EVENING
Status: DISCONTINUED | OUTPATIENT
Start: 2024-09-30 | End: 2024-09-30

## 2024-09-30 RX ORDER — LIDOCAINE HYDROCHLORIDE 20 MG/ML
INJECTION, SOLUTION EPIDURAL; INFILTRATION; INTRACAUDAL; PERINEURAL PRN
Status: DISCONTINUED | OUTPATIENT
Start: 2024-09-30 | End: 2024-09-30 | Stop reason: SURG

## 2024-09-30 RX ORDER — INSULIN LISPRO 100 [IU]/ML
2-9 INJECTION, SOLUTION INTRAVENOUS; SUBCUTANEOUS
Status: DISCONTINUED | OUTPATIENT
Start: 2024-09-30 | End: 2024-09-30

## 2024-09-30 RX ORDER — INSULIN LISPRO 100 [IU]/ML
2-9 INJECTION, SOLUTION INTRAVENOUS; SUBCUTANEOUS EVERY 6 HOURS
Status: DISCONTINUED | OUTPATIENT
Start: 2024-09-30 | End: 2024-10-01

## 2024-09-30 RX ORDER — ONDANSETRON 2 MG/ML
4 INJECTION INTRAMUSCULAR; INTRAVENOUS EVERY 4 HOURS PRN
Status: DISCONTINUED | OUTPATIENT
Start: 2024-09-30 | End: 2024-09-30

## 2024-09-30 RX ORDER — DEXAMETHASONE SODIUM PHOSPHATE 4 MG/ML
INJECTION, SOLUTION INTRA-ARTICULAR; INTRALESIONAL; INTRAMUSCULAR; INTRAVENOUS; SOFT TISSUE PRN
Status: DISCONTINUED | OUTPATIENT
Start: 2024-09-30 | End: 2024-09-30 | Stop reason: SURG

## 2024-09-30 RX ORDER — SODIUM CHLORIDE, SODIUM LACTATE, POTASSIUM CHLORIDE, CALCIUM CHLORIDE 600; 310; 30; 20 MG/100ML; MG/100ML; MG/100ML; MG/100ML
INJECTION, SOLUTION INTRAVENOUS
Status: DISCONTINUED | OUTPATIENT
Start: 2024-09-30 | End: 2024-09-30 | Stop reason: SURG

## 2024-09-30 RX ORDER — POLYETHYLENE GLYCOL 3350 17 G/17G
1 POWDER, FOR SOLUTION ORAL 2 TIMES DAILY PRN
Status: DISCONTINUED | OUTPATIENT
Start: 2024-09-30 | End: 2024-09-30

## 2024-09-30 RX ORDER — PHENYLEPHRINE HYDROCHLORIDE 10 MG/ML
INJECTION, SOLUTION INTRAMUSCULAR; INTRAVENOUS; SUBCUTANEOUS PRN
Status: DISCONTINUED | OUTPATIENT
Start: 2024-09-30 | End: 2024-09-30 | Stop reason: SURG

## 2024-09-30 RX ORDER — FENOFIBRATE 134 MG/1
134 CAPSULE ORAL EVERY EVENING
Status: DISCONTINUED | OUTPATIENT
Start: 2024-09-30 | End: 2024-10-01

## 2024-09-30 RX ORDER — DIPHENHYDRAMINE HCL 25 MG
25 TABLET ORAL NIGHTLY PRN
Status: DISCONTINUED | OUTPATIENT
Start: 2024-09-30 | End: 2024-10-01

## 2024-09-30 RX ORDER — POTASSIUM CHLORIDE 1500 MG/1
20 TABLET, EXTENDED RELEASE ORAL DAILY
Status: DISCONTINUED | OUTPATIENT
Start: 2024-09-30 | End: 2024-10-01

## 2024-09-30 RX ORDER — LIDOCAINE HYDROCHLORIDE 20 MG/ML
INJECTION, SOLUTION INFILTRATION; PERINEURAL
Status: DISCONTINUED | OUTPATIENT
Start: 2024-09-30 | End: 2024-09-30 | Stop reason: HOSPADM

## 2024-09-30 RX ORDER — ACETAMINOPHEN 325 MG/1
650 TABLET ORAL EVERY 6 HOURS PRN
Status: DISCONTINUED | OUTPATIENT
Start: 2024-09-30 | End: 2024-09-30

## 2024-09-30 RX ORDER — INSULIN LISPRO 100 [IU]/ML
2-9 INJECTION, SOLUTION INTRAVENOUS; SUBCUTANEOUS EVERY 6 HOURS
Status: DISCONTINUED | OUTPATIENT
Start: 2024-09-30 | End: 2024-09-30

## 2024-09-30 RX ORDER — BUPIVACAINE HYDROCHLORIDE 2.5 MG/ML
INJECTION, SOLUTION EPIDURAL; INFILTRATION; INTRACAUDAL
Status: DISCONTINUED | OUTPATIENT
Start: 2024-09-30 | End: 2024-09-30 | Stop reason: HOSPADM

## 2024-09-30 RX ORDER — FUROSEMIDE 10 MG/ML
40 INJECTION INTRAMUSCULAR; INTRAVENOUS 2 TIMES DAILY
Status: DISCONTINUED | OUTPATIENT
Start: 2024-09-30 | End: 2024-09-30

## 2024-09-30 RX ORDER — HALOPERIDOL 5 MG/ML
1 INJECTION INTRAMUSCULAR
Status: DISCONTINUED | OUTPATIENT
Start: 2024-09-30 | End: 2024-09-30

## 2024-09-30 RX ADMIN — LIDOCAINE HYDROCHLORIDE 4 ML: 40 SOLUTION TOPICAL at 09:29

## 2024-09-30 RX ADMIN — NYSTATIN: 100000 POWDER TOPICAL at 17:55

## 2024-09-30 RX ADMIN — FENTANYL CITRATE 50 MCG: 50 INJECTION, SOLUTION INTRAMUSCULAR; INTRAVENOUS at 20:52

## 2024-09-30 RX ADMIN — SUGAMMADEX 200 MG: 100 INJECTION, SOLUTION INTRAVENOUS at 10:32

## 2024-09-30 RX ADMIN — PROPOFOL 150 MG: 10 INJECTION, EMULSION INTRAVENOUS at 09:28

## 2024-09-30 RX ADMIN — ROCURONIUM BROMIDE 50 MG: 10 INJECTION, SOLUTION INTRAVENOUS at 09:28

## 2024-09-30 RX ADMIN — NYSTATIN: 100000 POWDER TOPICAL at 17:00

## 2024-09-30 RX ADMIN — ENOXAPARIN SODIUM 40 MG: 100 INJECTION SUBCUTANEOUS at 17:54

## 2024-09-30 RX ADMIN — FENTANYL CITRATE 50 MCG: 50 INJECTION, SOLUTION INTRAMUSCULAR; INTRAVENOUS at 10:16

## 2024-09-30 RX ADMIN — LIDOCAINE HYDROCHLORIDE 100 MG: 20 INJECTION, SOLUTION EPIDURAL; INFILTRATION; INTRACAUDAL at 09:28

## 2024-09-30 RX ADMIN — FENTANYL CITRATE 50 MCG: 50 INJECTION, SOLUTION INTRAMUSCULAR; INTRAVENOUS at 10:20

## 2024-09-30 RX ADMIN — PHENYLEPHRINE HYDROCHLORIDE 200 MCG: 10 INJECTION INTRAVENOUS at 09:51

## 2024-09-30 RX ADMIN — PHENYLEPHRINE HYDROCHLORIDE 200 MCG: 10 INJECTION INTRAVENOUS at 09:34

## 2024-09-30 RX ADMIN — PRAVASTATIN SODIUM 40 MG: 20 TABLET ORAL at 20:52

## 2024-09-30 RX ADMIN — SODIUM CHLORIDE, POTASSIUM CHLORIDE, SODIUM LACTATE AND CALCIUM CHLORIDE: 600; 310; 30; 20 INJECTION, SOLUTION INTRAVENOUS at 09:23

## 2024-09-30 RX ADMIN — PHENYLEPHRINE HYDROCHLORIDE 200 MCG: 10 INJECTION INTRAVENOUS at 09:33

## 2024-09-30 RX ADMIN — INSULIN LISPRO 2 UNITS: 100 INJECTION, SOLUTION INTRAVENOUS; SUBCUTANEOUS at 15:45

## 2024-09-30 RX ADMIN — ONDANSETRON 8 MG: 2 INJECTION INTRAMUSCULAR; INTRAVENOUS at 10:26

## 2024-09-30 RX ADMIN — AMLODIPINE BESYLATE 10 MG: 10 TABLET ORAL at 20:52

## 2024-09-30 RX ADMIN — PROTAMINE SULFATE 50 MG: 10 INJECTION, SOLUTION INTRAVENOUS at 10:19

## 2024-09-30 RX ADMIN — POTASSIUM CHLORIDE 20 MEQ: 1500 TABLET, EXTENDED RELEASE ORAL at 13:53

## 2024-09-30 RX ADMIN — PROPOFOL 50 MCG/KG/MIN: 10 INJECTION, EMULSION INTRAVENOUS at 10:46

## 2024-09-30 RX ADMIN — DEXAMETHASONE SODIUM PHOSPHATE 8 MG: 4 INJECTION INTRA-ARTICULAR; INTRALESIONAL; INTRAMUSCULAR; INTRAVENOUS; SOFT TISSUE at 09:28

## 2024-09-30 RX ADMIN — SODIUM CHLORIDE, POTASSIUM CHLORIDE, SODIUM LACTATE AND CALCIUM CHLORIDE: 600; 310; 30; 20 INJECTION, SOLUTION INTRAVENOUS at 08:19

## 2024-09-30 RX ADMIN — FAMOTIDINE 20 MG: 20 TABLET, FILM COATED ORAL at 17:54

## 2024-09-30 RX ADMIN — CEFAZOLIN 2 G: 1 INJECTION, POWDER, FOR SOLUTION INTRAMUSCULAR; INTRAVENOUS at 09:28

## 2024-09-30 RX ADMIN — HEPARIN SODIUM 9000 UNITS: 1000 INJECTION, SOLUTION INTRAVENOUS; SUBCUTANEOUS at 09:55

## 2024-09-30 RX ADMIN — EPHEDRINE SULFATE 10 MG: 50 INJECTION, SOLUTION INTRAVENOUS at 09:35

## 2024-09-30 RX ADMIN — FUROSEMIDE 40 MG: 10 INJECTION, SOLUTION INTRAVENOUS at 12:58

## 2024-09-30 RX ADMIN — PROPOFOL 40 MCG/KG/MIN: 10 INJECTION, EMULSION INTRAVENOUS at 12:39

## 2024-09-30 RX ADMIN — FENOFIBRATE 134 MG: 134 CAPSULE ORAL at 17:54

## 2024-09-30 ASSESSMENT — PAIN DESCRIPTION - PAIN TYPE
TYPE: ACUTE PAIN

## 2024-09-30 ASSESSMENT — FIBROSIS 4 INDEX
FIB4 SCORE: 2.8
FIB4 SCORE: 1.66

## 2024-09-30 NOTE — ANESTHESIA POSTPROCEDURE EVALUATION
Patient: Juan Manuel Martinez    Procedure Summary       Date: 09/30/24 Room / Location: [place] [address] / SURGERY [facility]    Anesthesia Start: 0923 Anesthesia Stop: 1110    Procedures:       TRANSCATHETER AORTIC VALVE REPLACEMENT (Bilateral: Groin)      ECHOCARDIOGRAM, TRANSESOPHAGEAL, INTRAOPERATIVE (Throat) Diagnosis: (SEVERE AORTIC STENOSIS)    Surgeons: Jerrell Eller M.D. Responsible Provider: Jose E Alfaro M.D.    Anesthesia Type: general ASA Status: 4            Final Anesthesia Type: general  Last vitals  BP   Blood Pressure : 117/71    Temp   36.4 °C (97.5 °F)    Pulse   84   Resp   (!) 25    SpO2   100 %      Anesthesia Post Evaluation    Patient location during evaluation: ICU  Patient participation: complete - patient cannot participate  Level of consciousness: obtunded/minimal responses    Anesthetic complications: no  Cardiovascular status: hemodynamically stable  Respiratory status: ETT              There were no known notable events for this encounter.     Nurse Pain Score: 0 (NPRS)

## 2024-09-30 NOTE — ASSESSMENT & PLAN NOTE
Start forced diuresis with Lasix  S/p TAVR with Dr. Weinstein and Dr. Eller  Cont to have MR and mitral stenosis

## 2024-09-30 NOTE — H&P
Critical Care History & Physical Note    Date of Service  9/30/2024    Primary Care Physician  Francois Penaloza M.D.    Consultants  cardiology, cardiovascular surgeon, and critical care    Specialist Names: Dr. Eller and Dr. Weinstein    Code Status  Full Code    Chief Complaint  Acute on chronic heart failure due to critical aortic stenosis      History of Presenting Illness  All history was obtained from chart review and bedside handoff with Dr. Alfaro, anesthesiologist, as the patient was still intubated and sedated at the time of my evaluation.    Juan Manuel Martinez is a 79 y.o. male with a past medical history significant for prostate cancer s/p prostatectomy, hypertension, mitral regurgitation, type 2 diabetes, severe mitral stenosis, and severe aortic stenosis who presented on 9/30/2024 to undergo elective TAVR for critical aortic stenosis with decompensated heart failure.  The patient was recently referred to the cardiology service due to his valvular heart disease and decompensated heart failure.  At that time it was decided to start him on oral diuretics as well as undergo a elective TAVR.  The patient was admitted on 9/30 and intubated without any difficulty; however, was found to be hypoxic when laid down.  He required FiO2 of 100% and a PEEP of 10.  The TAVR was uncomplicated and successful however he does still have a Yash valvular leak.  The patient remained hemodynamically stable throughout the entire procedure.  He did receive 1 L of IV fluids.  He came back and was admitted to the ICU still intubated and sedated.  The patient remains on 100% FiO2 with a PEEP of 10.  A ASAD does show a left pleural effusion.  The critical care team will admit the patient to undergo diuresis and ventilator weaning.    I discussed the plan of care with family, bedside RN, charge RN, pharmacy, RT, and cardiology.    Review of Systems  Review of Systems   Unable to perform ROS: Intubated       Past Medical History    has a past medical history of Breath shortness, Cancer ([facility]) (2012), Dental disorder, Diabetes (HCC), Heart murmur, High cholesterol, Hypertension, Pneumonia (08/30/2024), Prostate cancer ([facility]), Prostate cancer ([facility]), Severe aortic stenosis (08/20/2024), and Severe mitral valve stenosis (08/21/2024).    Surgical History   has a past surgical history that includes prostatectomy, radial and fissurectomy (2014).     Family History  family history includes Breast Cancer in his mother; Hypertension in his mother; No Known Problems in his father.   Family history reviewed with patient. There is no family history that is pertinent to the chief complaint.     Social History   reports that he has never smoked. He has never used smokeless tobacco. He reports current alcohol use of about 2.4 - 3.0 oz of alcohol per week. He reports current drug use. Drug: Inhaled.    Allergies  No Known Allergies    Medications  Prior to Admission Medications   Prescriptions Last Dose Informant Patient Reported? Taking?   B Complex Vitamins (VITAMIN B COMPLEX PO) >1 WEEK AGO at HOLD Significant Other Yes No   Sig: Take 1 Tablet by mouth every day.   Cholecalciferol (VITAMIN D3) 125 MCG (5000 UT) Cap >1 WEEK AGO at HOLD Significant Other Yes No   Sig: Take 1 Capsule by mouth every day.   Krill Oil 500 MG Cap >1 WEEK AGO at HOLD Significant Other Yes No   Sig: Take 500 mg by mouth every day.   MYRBETRIQ 50 MG TABLET SR 24 HR 9/29/2024 at PM Significant Other Yes Yes   Sig: Take 50 mg by mouth every evening.   Probiotic Product (PROBIOTIC 10 ULTRA STRENGTH) Cap >1 WEEK AGO at HOLD Significant Other Yes No   Sig: Take 1 Capsule by mouth every day.   acetaminophen (TYLENOL) 500 MG Tab 9/28/2024 at PRN Significant Other Yes Yes   Sig: Take 1,000 mg by mouth every 6 hours as needed for Moderate Pain.   amLODIPine (NORVASC) 10 MG Tab 9/30/2024 at 0600 Significant Other Yes Yes   Sig: Take 10 mg by mouth every day.   fenofibrate (TRIGLIDE) 160 MG  tablet 9/27/2024 at HOLD Significant Other Yes No   Sig: Take 160 mg by mouth every day.   furosemide (LASIX) 20 MG Tab 9/29/2024 at AM Significant Other No Yes   Sig: Take 1 Tablet by mouth every day.   ibuprofen (MOTRIN) 200 MG Tab >1 WEEK AGO at HOLD Significant Other Yes Yes   Sig: Take 400 mg by mouth every 8 hours as needed for Mild Pain.   losartan-hydrochlorothiazide (HYZAAR) 100-12.5 MG per tablet 9/27/2024 at HOLD Significant Other Yes No   Sig: Take 1 Tablet by mouth every day.   metformin (GLUCOPHAGE) 1000 MG tablet 9/28/2024 at HOLD Significant Other Yes No   Sig: Take 1,000 mg by mouth 2 times a day with meals.   metoprolol SR (TOPROL XL) 50 MG TABLET SR 24 HR 9/29/2024 at AM Significant Other Yes Yes   Sig: Take 50 mg by mouth every day.   pravastatin (PRAVACHOL) 40 MG tablet 9/27/2024 at HOLD Significant Other Yes No   Sig: Take 40 mg by mouth every evening.      Facility-Administered Medications: None       Physical Exam  Temp:  [36.4 °C (97.5 °F)] 36.4 °C (97.5 °F)  Pulse:  [99] 99  Resp:  [17] 17  BP: (114-117)/(71-77) 117/71  SpO2:  [91 %] 91 %  Blood Pressure : 117/71   Temperature: 36.4 °C (97.5 °F)   Pulse: 99   Respiration: 17   Pulse Oximetry: 91 %       Physical Exam  Vitals and nursing note reviewed.   Constitutional:       General: He is not in acute distress.     Appearance: He is ill-appearing. He is not toxic-appearing.   HENT:      Head: Normocephalic and atraumatic.      Right Ear: External ear normal.      Left Ear: External ear normal.      Nose: Nose normal. No rhinorrhea.      Mouth/Throat:      Mouth: Mucous membranes are moist.      Comments: ETT in place  Eyes:      General: No scleral icterus.     Conjunctiva/sclera: Conjunctivae normal.      Pupils: Pupils are equal, round, and reactive to light.   Cardiovascular:      Rate and Rhythm: Normal rate and regular rhythm.      Heart sounds: No murmur heard.  Pulmonary:      Breath sounds: No wheezing.      Comments: Breathing  "comfortably on the vent, diminished breath sounds on the right  Abdominal:      Palpations: Abdomen is soft.      Tenderness: There is no abdominal tenderness. There is no guarding or rebound.   Genitourinary:     Comments: Guevara in place  Musculoskeletal:         General: Normal range of motion.      Cervical back: Normal range of motion and neck supple.      Right lower leg: Edema present.      Left lower leg: Edema present.   Lymphadenopathy:      Cervical: No cervical adenopathy.   Skin:     General: Skin is warm and dry.      Capillary Refill: Capillary refill takes less than 2 seconds.      Findings: No rash.   Neurological:      Cranial Nerves: No cranial nerve deficit.      Sensory: No sensory deficit.      Motor: No weakness.   Psychiatric:      Comments: Unable to assess         Laboratory:          No results for input(s): \"ALTSGPT\", \"ASTSGOT\", \"ALKPHOSPHAT\", \"TBILIRUBIN\", \"DBILIRUBIN\", \"GAMMAGT\", \"AMYLASE\", \"LIPASE\", \"ALB\", \"PREALBUMIN\", \"GLUCOSE\" in the last 72 hours.      No results for input(s): \"NTPROBNP\" in the last 72 hours.      No results for input(s): \"TROPONINT\" in the last 72 hours.    Imaging:  EC-ASAD W/O CONT         DX-CHEST-PORTABLE (1 VIEW)   Final Result      1.  Hardware position as described above   2.  Persistently enlarged cardiac silhouette   3.  BILATERAL pleural effusions   4.  BILATERAL mixed interstitial and airspace disease likely pulmonary edema superimposed on atelectasis      US-THORACENTESIS PUNCTURE LEFT    (Results Pending)       Assessment/Plan:  Justification for Admission Status  I anticipate this patient will require at least two midnights for appropriate medical management, necessitating inpatient admission because acute hypoxic respiratory failure during TAVR procedure    Patient will need a ICU (Adult and Pediatrics) bed on CARDIOLOGY service .  The need is secondary to ventilator management due to acute hypoxic respiratory failure during TAVR procedure.    * S/P " TAVR (transcatheter aortic valve replacement)  Assessment & Plan  9/30/24: Successful transcatheter aortic valve replacement using 26 mm Grady gonzález 3  Ultra valve with moderate PVL      Acute on chronic respiratory failure with hypoxia (HCC)- (present on admission)  Assessment & Plan  Due to flash cardiogenic pulmonary edema due to severe valve disease  Intubated for TAVR procedure on 9/30  Cont full vent support  RT/O2 protocols  Cont vent bundle protocols  Lung protective ventilator strategies with high PEEP and low tidal volume ventilation  Start forced diuresis with Lasix 40mg IV BID  SAT/SBTs in the morning  Eval for left pleural effusion and need for thoracentesis    Acute on 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)- (present on admission)  Assessment & Plan  Start forced diuresis with Lasix  S/p TAVR with Dr. Weinstein and Dr. Eller  Cont to have MR and mitral stenosis    Elevated left ventricular end-diastolic pressure (LVEDP)- (present on admission)  Assessment & Plan  Likely due to flash pulmonary edema  Start forced diuresis with Lasix 40mg IV BID    History of malignant neoplasm of prostate- (present on admission)  Assessment & Plan  Hx of    Severe mitral valve stenosis- (present on admission)  Assessment & Plan  SBP goals < 160  Continue diuresis    Type 2 diabetes mellitus with hyperglycemia, without long-term current use of insulin (HCC)- (present on admission)  Assessment & Plan  BG goals 120-180s  Medium ISS  Holding home metformin  Hypoglycemia protocols    Primary hypertension- (present on admission)  Assessment & Plan  SBP goals < 160  Cont home amlodipine 10mg PO daily  Cont losartan-HCTZ 100-12.5mg PO daily  PRN hydralazine        VTE prophylaxis: SCDs/TEDs and enoxaparin ppx      The patient remains critically ill.  I have assessed and reassessed the respiratory status and made ventilator adjustments based upon arterial blood gas analysis, ventilator waveforms and  airway mechanics.  I have assessed and reassessed the blood pressure, hemodynamics, cardiovascular status. This patient remains at high risk for worsening cardiopulmonary dysfunction and death without the above critical care interventions.    Critical care time = 115 minutes.

## 2024-09-30 NOTE — PROGRESS NOTES
Medication history reviewed with PT's spouse, Ely at bedside    Med rec is complete per Spouse reporting    Allergies reviewed.     Spouse denies any outpatient antibiotics in the last 30 days.     Patient is not taking anticoagulants.    Preferred pharmacy for this visit - [facility] on [address] ([phone])

## 2024-09-30 NOTE — ANESTHESIA PROCEDURE NOTES
Airway    Date/Time: 9/30/2024 9:29 AM    Performed by: Jose E Alfaro M.D.  Authorized by: Jose E Alfaro M.D.    Location:  OR  Urgency:  Elective  Difficult Airway: No    Indications for Airway Management:  Anesthesia      Spontaneous Ventilation: absent    Sedation Level:  Deep  Preoxygenated: Yes    Patient Position:  Sniffing  Final Airway Type:  Endotracheal airway  Final Endotracheal Airway:  ETT  Cuffed: Yes    Technique Used for Successful ETT Placement:  Direct laryngoscopy  Devices/Methods Used in Placement:  Intubating stylet    Insertion Site:  Oral  Blade Type:  Daniela  Laryngoscope Blade/Videolaryngoscope Blade Size:  4  ETT Size (mm):  8.0  Measured from:  Teeth  ETT to Teeth (cm):  24  Placement Verified by: auscultation and capnometry    Cormack-Lehane Classification:  Grade I - full view of glottis  Number of Attempts at Approach:  1

## 2024-09-30 NOTE — PROGRESS NOTES
4 Eyes Skin Assessment Completed by DONNELL Kearns and DONNELL Fink.    Head Scab  Ears Redness and Blanching  Nose WDL  Mouth WDL  Neck WDL  Breast/Chest WDL  Shoulder Blades WDL  Spine WDL  (R) Arm/Elbow/Hand Redness and Blanching, tr band site radial  (L) Arm/Elbow/Hand Redness and Blanching  Abdomen Abrasion  Groin Redness, Blanching, Bruising, and Rash cath site bilateral   Scrotum/Coccyx/Buttocks Redness, Blanching, and Excoriation  (R) Leg Edema  (L) Leg Edema  (R) Heel/Foot/Toe Redness and Blanching Dryness  (L) Heel/Foot/Toe Redness and Blanching Dryness                                Devices In Places Tele Box, Blood Pressure Cuff, Pulse Ox, Guevara, SCD's, ET Tube, and OG/NG      Interventions In Place TAP System, Pillows, Elbow Mepilex, Q2 Turns, Low Air Loss Mattress, Heels Loaded W/Pillows, and Pressure Redistribution Mattress    Possible Skin Injury No    Pictures Uploaded Into Epic Yes  Wound Consult Placed N/A  RN Wound Prevention Protocol Ordered No

## 2024-09-30 NOTE — ASSESSMENT & PLAN NOTE
9/30/24: Successful transcatheter aortic valve replacement using 26 mm Grady gonzález 3  Ultra valve with moderate PVL

## 2024-09-30 NOTE — CARE PLAN
Problem: Ventilation  Goal: Ability to achieve and maintain unassisted ventilation or tolerate decreased levels of ventilator support  Description: Target End Date:  4 days     Document on Vent flowsheet    1.  Support and monitor invasive and noninvasive mechanical ventilation  2.  Monitor ventilator weaning response  3.  Perform ventilator associated pneumonia prevention interventions  4.  Manage ventilation therapy by monitoring diagnostic test results  Outcome: Progressing     Ventilator Daily Summary    Vent Day #1  Airway: 8.0@23    Ventilator settings: APV 22, 440, +10, 70%  Weaning trials: None  Respiratory Procedures: None    Plan: Continue current ventilator settings and wean mechanical ventilation as tolerated per physician orders.

## 2024-09-30 NOTE — CARE PLAN
The patient is Watcher - Medium risk of patient condition declining or worsening         Progress made toward(s) clinical / shift goals:    Problem: Knowledge Deficit - Standard  Goal: Patient and family/care givers will demonstrate understanding of plan of care, disease process/condition, diagnostic tests and medications  9/30/2024 1526 by Urmila Bragg R.N.  Outcome: Progressing  Note: Family understands POC  9/30/2024 1524 by EFRAIN VazquezN.  Outcome: Progressing  Note: Family understands POC     Problem: Safety - Medical Restraint  Goal: Remains free of injury from restraints (Restraint for Interference with Medical Device)  9/30/2024 1526 by EFRAIN VazquezN.  Outcome: Progressing  Note: Free from injury  9/30/2024 1524 by Urmila Bragg R.N.  Outcome: Progressing     Problem: Hemodynamics  Goal: Patient's hemodynamics, fluid balance and neurologic status will be stable or improve  9/30/2024 1526 by Urmila Bragg R.N.  Outcome: Progressing  Note: Hemodynamics stable during my shift  9/30/2024 1524 by Urmila Bragg R.N.  Outcome: Progressing  Note: VSS for my shift     Problem: Respiratory  Goal: Patient will achieve/maintain optimum respiratory ventilation and gas exchange  9/30/2024 1526 by Urmila Bragg R.N.  Outcome: Progressing  9/30/2024 1524 by Urmila Bragg R.N.  Outcome: Progressing     Problem: Mechanical Ventilation  Goal: Safe management of artificial airway and ventilation  Outcome: Progressing     Problem: Pain - Standard  Goal: Alleviation of pain or a reduction in pain to the patient’s comfort goal  Outcome: Progressing       Patient is not progressing towards the following goals:

## 2024-09-30 NOTE — PROCEDURES
DATE OF PROCEDURE: 9/30/24    PROCEDURES performed:  1. Transcatheter aortic valve replacement.  2. Insertion and removal of temporary pacer wire    PRE PROCEDURAL DIAGNOSIS:  1. Severe symptomatic aortic stenosis, NYHA III.  2. Acute on chronic diastolic heart failure    Post-procedure diagnosis:  Successful transcatheter aortic valve replacement  Acute on chronic diastolic heart failure, elevated LVEDP 41 mmHg  Moderate paravalvular leak due to severe annular calcium extending into LVOT.    Operators:  CT Surgeon:   Interventional cardiologist: Dr. Eller    DESCRIPTION OF PROCEDURE:  After informed consent was signed by the   patient, the patient was brought into the operating room, was prepped and draped in   usual sterile manner.  Timeout was performed.   General anesthesia and   Transesophagealechocardiogram was provided by .    Primary Arterial access:   Left femoral artery was cannulated using modified Seldinger technique under ultrasound guidance, a 6 Italian sheath was inserted.  2 Perclose sutures were placed, arteriotomy was serially dilated,14 Fr sheath was inserted in the femoral artery over a stiff Lunderquist wire.    Secondary arterial access:   Right radial artery was cannulated with 6 Italian sheath.  A pigtail catheter was advanced through 6 Italian sheath, aortic root angiogram was performed to determine coplanar view.    Venous Access:   Right femoral vein was cannulated with 6 Italian sheath, a temporary pacer wire was advanced to RV apex in usual fashion.    Through Grady self-expandable sheath, AL-1 catheter was advanced to aortic root, straight wire was used to cross the aortic valve, a stiff Amplatz wire was placed in the LV apex and AL-1 catheter was withdrawn. Aortic valve is severely calcified, balloon valvuloplasty was performed with 23mm balloon to facilitate TAVR valve insertion. 26 mm Grady Luciano 3 edward was prepped in usual fashion, orientation was confirmed.   Sapient 3 valve was slowly advanced over a stiff Amplatz wire to aortic position.  Rapid pacing was achieved using temporary pacer wire, aortic root angiogram was performed and after confirming good positioning, valve was slowly deployed under fluoroscopic guidance leaving 1 cc in the volume.Post procedure echocardiogram showed severe paravalvular leak, good valve positioning. Valve was post dilated with nominal volume and adding one more cc of volume at 7 atms, with slight improvement in paravalvular leak to moderate but still significant. Due to severe focal calcium, just expanding the valve was not enough, he will likely need closure with vascular plug.    The patient tolerated the procedure well. At the end of procedure, all pacemaker wire,   pigtail catheters and sheaths were removed.  The primary arterial access site was closed with the PreClose system.  The secondary arterial access was closed via vasc-band. The patient was transferred to PACU in stable condition.    Complications: none  Specimens: none  Estimated blood loss: <50cc      IMPRESSION:  Successful transcatheter aortic valve replacement using 26 mm Grady gonzález 3  Ultra valve    RECOMMENDATION:   Guideline directed medical therapy.    Jerrell Eller  Interventional cardiologist

## 2024-09-30 NOTE — ANESTHESIA TIME REPORT
Anesthesia Start and Stop Event Times       Date Time Event    9/30/2024 0902 Ready for Procedure     0923 Anesthesia Start     1110 Anesthesia Stop          Responsible Staff  09/30/24      Name Role Begin End    Jose E Alfaro M.D. Anesth 0923 1110          Overtime Reason:  no overtime (within assigned shift)    Comments:

## 2024-09-30 NOTE — ANESTHESIA PROCEDURE NOTES
ASAD    Date/Time: 9/30/2024 9:40 AM    Performed by: Jose E Alfaro M.D.  Authorized by: Jose E Alfaro M.D.    Start Time:9/30/2024 9:40 AM  Preanesthetic Checklist: patient identified, IV checked, risks and benefits discussed, surgical consent, monitors and equipment checked, pre-op evaluation and timeout performed    Indication for ASAD: diagnostic   Patient Location: OR  Intubated: Yes  Bite Block: Yes  Heart Visualized: Yes  Insertion: atraumatic    **See FULL ASAD report in patient's chart via CV Synapse**

## 2024-09-30 NOTE — OR SURGEON
OPERATIVE REPORT    Operation date: 9/30/2024    PreOp Diagnosis: Severe symptomatic aortic stenosis    PostOp Diagnosis: Severe symptomatic aortic stenosis    Procedure(s):  TRANSCATHETER AORTIC VALVE REPLACEMENT (TAVR 23 mm S3 Ultra Resilia Grady bovine pericardial valve, +1 mL post implant dilatation), aortic balloon valvuloplasty (23 mm balloon) and intraoperative transesophageal echocardiography    Surgeon(s):  EDITA Warner M.D.    Anesthesiologist/Type of Anesthesia:  Anesthesiologist: Jose E Alfaro M.D.  Perfusionist: Josemanuel Neal/General    Surgical Staff:  Circulator: Jonny Fields R.N.; Monty Garland R.N.  Scrub Person: Luz Brownlee  Radiology Technologist: Estefania Paniagua; Josemanuel Morris    Specimens removed if any: None    Estimated Blood Loss: Minimal    Findings: Severe aortic stenosis    Complications: None    Indications:  Mr. Martinez is an 79-year-old male with severe symptomatic aortic stenosis.    Procedure:  The patient was brought to the operating room and placed on the operating room table in the supine position.  After successful induction of general anesthesia endotracheal intubation, the patient was prepped and draped in the usual sterile fashion.  Ultrasound-guided right radial arterial, left femoral arterial and right femoral venous access was obtained.  A temporary transvenous pacemaker was placed in the right ventricle and a pigtail catheter in the right coronary sinus.  The deployment angle was determined.  A 1 cm incision was made in the left groin and 2 Perclose sutures were deployed.  A 14 Romansh eSheath was then placed in the left common femoral artery and its tip was positioned in the abdominal aorta.  The aortic valve was crossed with a wire.  Aortic balloon valvuloplasty was performed first with a 23 mm balloon.  A deployment catheter with a 23 mm S3 Ultra Resilia Grady bovine pericardial valve at its tip was positioned across  the aortic annulus.  The implantation balloon was inflated to a peak pressure of 6.5 musa.  Intraoperative transesophageal echocardiography showed a moderate paravalvular leak.  Post implant dilatation was performed twice with an additional mL of volume in the balloon.  Both times the peak balloon pressure reached nearly 8 musa.  Wires and catheters were removed.  Intraoperative transesophageal echocardiography still showed a moderate paravalvular leak.  The left femoral eSheath was removed and the Perclose sutures were tightened down.  The remainder of the operation will be dictated by Dr. Eller.  There were no apparent complications.  The patient tolerated the procedure well but required ventilatory support to leave the operating room.      9/30/2024 10:37 AM Shruti Weinstein MD, FACS

## 2024-09-30 NOTE — ANESTHESIA PROCEDURE NOTES
Arterial Line    Performed by: Jose E Alfaro M.D.  Authorized by: Jose E Alfaro M.D.    Start Time:  9/30/2024 9:33 AM  End Time:  9/30/2024 9:38 AM  Localization: ultrasound guidance  Image captured, interpreted and electronically stored.  Patient Location:  OR  Indication: continuous blood pressure monitoring and blood sampling needed        Catheter Size:  20 G  Seldinger Technique?: Yes    Laterality:  Left  Site:  Radial artery  Line Secured:  Antimicrobial disc, tape and transparent dressing  Events: patient tolerated procedure well with no complications

## 2024-09-30 NOTE — ASSESSMENT & PLAN NOTE
SBP goals < 160  Cont home amlodipine 10mg PO daily  Cont losartan-HCTZ 100-12.5mg PO daily  PRN hydralazine

## 2024-09-30 NOTE — ANESTHESIA PREPROCEDURE EVALUATION
Case: [id number] Anesthesia Start Date/Time: 09/30/24 0923    Procedures:       TRANSCATHETER AORTIC VALVE REPLACEMENT (Bilateral: Groin) - Site also includes right wrist, site clean, closure N/A    Grady Luciano 3 Ultra RESILIA 26mm  Ref [id number]  SN [id number]  Exp 03/28/2027      ECHOCARDIOGRAM, TRANSESOPHAGEAL, INTRAOPERATIVE (Throat)    Anesthesia type: general    Pre-op diagnosis: SEVERE AORTIC STENOSIS    Location: [place] [id number] / SURGERY [facility]    Surgeons: Jerrell Eller M.D.          Severe Mitral Valve Regurgitation  Baseline Hypoxemia    Relevant Problems   CARDIAC   (positive) Nonrheumatic mitral valve regurgitation   (positive) Primary hypertension   (positive) Severe aortic stenosis      ENDO   (positive) Type 2 diabetes mellitus with hyperglycemia, without long-term current use of insulin (HCC)      Other   (positive) Severe mitral valve stenosis       Physical Exam    Airway   Mallampati: II  TM distance: >3 FB  Neck ROM: full       Cardiovascular - normal exam  Rhythm: regular  Rate: normal  (-) murmur     Dental   (+) upper dentures           Pulmonary - normal exam  Breath sounds clear to auscultation     Abdominal    Neurological - normal exam                   Anesthesia Plan    ASA 4 (Severe Multivalvular Stenosis)   ASA physical status 4 criteria: other (comment)    Plan - general       Airway plan will be ETT  ASAD Planned        Induction: intravenous    Postoperative Plan: Postoperative administration of opioids is intended.    Pertinent diagnostic labs and testing reviewed    Informed Consent:    Anesthetic plan and risks discussed with patient and spouse.    Use of blood products discussed with: patient and spouse whom.

## 2024-09-30 NOTE — ASSESSMENT & PLAN NOTE
Due to flash cardiogenic pulmonary edema due to severe valve disease  Intubated for TAVR procedure on 9/30  Cont full vent support  RT/O2 protocols  Cont vent bundle protocols  Lung protective ventilator strategies with high PEEP and low tidal volume ventilation  Start forced diuresis with Lasix 40mg IV BID  SAT/SBTs in the morning  Eval for left pleural effusion and need for thoracentesis

## 2024-10-01 ENCOUNTER — APPOINTMENT (OUTPATIENT)
Dept: RADIOLOGY | Facility: MEDICAL CENTER | Age: 79
DRG: 266 | End: 2024-10-01
Attending: INTERNAL MEDICINE
Payer: MEDICARE

## 2024-10-01 ENCOUNTER — APPOINTMENT (OUTPATIENT)
Dept: CARDIOLOGY | Facility: MEDICAL CENTER | Age: 79
DRG: 266 | End: 2024-10-01
Attending: NURSE PRACTITIONER
Payer: MEDICARE

## 2024-10-01 ENCOUNTER — APPOINTMENT (OUTPATIENT)
Dept: RADIOLOGY | Facility: MEDICAL CENTER | Age: 79
DRG: 266 | End: 2024-10-01
Attending: NURSE PRACTITIONER
Payer: MEDICARE

## 2024-10-01 ENCOUNTER — HOSPITAL ENCOUNTER (OUTPATIENT)
Dept: RADIOLOGY | Facility: MEDICAL CENTER | Age: 79
End: 2024-10-01
Attending: NURSE PRACTITIONER

## 2024-10-01 LAB
ALBUMIN SERPL BCP-MCNC: 3.2 G/DL (ref 3.2–4.9)
ALBUMIN/GLOB SERPL: 1.2 G/DL
ALP SERPL-CCNC: 45 U/L (ref 30–99)
ALT SERPL-CCNC: 30 U/L (ref 2–50)
AMYLASE FLD-CCNC: 14 U/L
ANION GAP SERPL CALC-SCNC: 14 MMOL/L (ref 7–16)
APPEARANCE FLD: NORMAL
ARTERIAL PATENCY WRIST A: ABNORMAL
AST SERPL-CCNC: 66 U/L (ref 12–45)
BASE EXCESS BLDA CALC-SCNC: -2 MMOL/L (ref -4–3)
BASOPHILS # BLD AUTO: 0.1 % (ref 0–1.8)
BASOPHILS # BLD: 0.01 K/UL (ref 0–0.12)
BILIRUB SERPL-MCNC: 0.5 MG/DL (ref 0.1–1.5)
BODY FLD TYPE: NORMAL
BODY TEMPERATURE: ABNORMAL DEGREES
BREATHS SETTING VENT: 22
BUN SERPL-MCNC: 28 MG/DL (ref 8–22)
CALCIUM ALBUM COR SERPL-MCNC: 9.2 MG/DL (ref 8.5–10.5)
CALCIUM SERPL-MCNC: 8.6 MG/DL (ref 8.5–10.5)
CELLS FLD: 4
CHLORIDE SERPL-SCNC: 108 MMOL/L (ref 96–112)
CHOLEST SERPL-MCNC: 145 MG/DL (ref 100–199)
CO2 BLDA-SCNC: 22 MMOL/L (ref 20–33)
CO2 SERPL-SCNC: 20 MMOL/L (ref 20–33)
COLOR FLD: NORMAL
CREAT SERPL-MCNC: 0.98 MG/DL (ref 0.5–1.4)
DELSYS IDSYS: ABNORMAL
EKG IMPRESSION: NORMAL
EKG IMPRESSION: NORMAL
END TIDAL CARBON DIOXIDE IECO2: 31 MMHG
EOSINOPHIL # BLD AUTO: 0 K/UL (ref 0–0.51)
EOSINOPHIL NFR BLD: 0 % (ref 0–6.9)
ERYTHROCYTE [DISTWIDTH] IN BLOOD BY AUTOMATED COUNT: 49.5 FL (ref 35.9–50)
FUNGUS SPEC FUNGUS STN: NORMAL
GFR SERPLBLD CREATININE-BSD FMLA CKD-EPI: 78 ML/MIN/1.73 M 2
GLOBULIN SER CALC-MCNC: 2.7 G/DL (ref 1.9–3.5)
GLUCOSE BLD STRIP.AUTO-MCNC: 116 MG/DL (ref 65–99)
GLUCOSE BLD STRIP.AUTO-MCNC: 134 MG/DL (ref 65–99)
GLUCOSE BLD STRIP.AUTO-MCNC: 141 MG/DL (ref 65–99)
GLUCOSE BLD STRIP.AUTO-MCNC: 146 MG/DL (ref 65–99)
GLUCOSE BLD STRIP.AUTO-MCNC: 154 MG/DL (ref 65–99)
GLUCOSE BLD STRIP.AUTO-MCNC: 169 MG/DL (ref 65–99)
GLUCOSE BLD STRIP.AUTO-MCNC: 195 MG/DL (ref 65–99)
GLUCOSE FLD-MCNC: 182 MG/DL
GLUCOSE SERPL-MCNC: 139 MG/DL (ref 65–99)
GRAM STN SPEC: NORMAL
HCO3 BLDA-SCNC: 21.6 MMOL/L (ref 17–25)
HCT VFR BLD AUTO: 32.9 % (ref 42–52)
HDLC SERPL-MCNC: 41 MG/DL
HGB BLD-MCNC: 11 G/DL (ref 14–18)
HOROWITZ INDEX BLDA+IHG-RTO: 298 MM[HG]
IMM GRANULOCYTES # BLD AUTO: 0.09 K/UL (ref 0–0.11)
IMM GRANULOCYTES NFR BLD AUTO: 0.7 % (ref 0–0.9)
LACTATE BLD-SCNC: 0.7 MMOL/L (ref 0.5–2)
LDH FLD L TO P-CCNC: 134 U/L
LDLC SERPL CALC-MCNC: 75 MG/DL
LV EJECT FRACT  99904: 50
LV EJECT FRACT  99904: 65
LV EJECT FRACT MOD 2C 99903: 64.14
LV EJECT FRACT MOD 4C 99902: 62.5
LV EJECT FRACT MOD BP 99901: 65.33
LYMPHOCYTES # BLD AUTO: 0.79 K/UL (ref 1–4.8)
LYMPHOCYTES NFR BLD: 5.9 % (ref 22–41)
LYMPHOCYTES NFR FLD: 39 %
MAGNESIUM SERPL-MCNC: 2.2 MG/DL (ref 1.5–2.5)
MCH RBC QN AUTO: 30.5 PG (ref 27–33)
MCHC RBC AUTO-ENTMCNC: 33.4 G/DL (ref 32.3–36.5)
MCV RBC AUTO: 91.1 FL (ref 81.4–97.8)
MODE IMODE: ABNORMAL
MONOCYTES # BLD AUTO: 1.86 K/UL (ref 0–0.85)
MONOCYTES NFR BLD AUTO: 13.8 % (ref 0–13.4)
MONOS+MACROS NFR FLD MANUAL: 31 %
NEUTROPHILS # BLD AUTO: 10.73 K/UL (ref 1.82–7.42)
NEUTROPHILS NFR BLD: 79.5 % (ref 44–72)
NEUTROPHILS NFR FLD: 26 %
NRBC # BLD AUTO: 0 K/UL
NRBC BLD-RTO: 0 /100 WBC (ref 0–0.2)
NT-PROBNP SERPL IA-MCNC: 1939 PG/ML (ref 0–125)
NUC CELL # FLD: 1333 CELLS/UL
O2/TOTAL GAS SETTING VFR VENT: 50 %
PCO2 BLDA: 30.6 MMHG (ref 26–37)
PCO2 TEMP ADJ BLDA: 30.3 MMHG (ref 26–37)
PEEP END EXPIRATORY PRESSURE IPEEP: 8 CMH20
PH BLDA: 7.46 [PH] (ref 7.4–7.5)
PH FLD: 8 [PH]
PH TEMP ADJ BLDA: 7.46 [PH] (ref 7.4–7.5)
PHOSPHATE SERPL-MCNC: 3.9 MG/DL (ref 2.5–4.5)
PLATELET # BLD AUTO: 177 K/UL (ref 164–446)
PMV BLD AUTO: 11 FL (ref 9–12.9)
PO2 BLDA: 149 MMHG (ref 64–87)
PO2 TEMP ADJ BLDA: 148 MMHG (ref 64–87)
POTASSIUM SERPL-SCNC: 3.4 MMOL/L (ref 3.6–5.5)
PROT FLD-MCNC: 2.6 G/DL
PROT SERPL-MCNC: 5.9 G/DL (ref 6–8.2)
RBC # BLD AUTO: 3.61 M/UL (ref 4.7–6.1)
RBC # FLD: NORMAL CELLS/UL
SAO2 % BLDA: 99 % (ref 93–99)
SIGNIFICANT IND 70042: NORMAL
SIGNIFICANT IND 70042: NORMAL
SITE SITE: NORMAL
SITE SITE: NORMAL
SODIUM SERPL-SCNC: 142 MMOL/L (ref 135–145)
SOURCE SOURCE: NORMAL
SOURCE SOURCE: NORMAL
SPECIMEN DRAWN FROM PATIENT: ABNORMAL
TIDAL VOLUME IVT: 440 ML
TRIGL SERPL-MCNC: 143 MG/DL (ref 0–149)
WBC # BLD AUTO: 13.5 K/UL (ref 4.8–10.8)

## 2024-10-01 PROCEDURE — 87116 MYCOBACTERIA CULTURE: CPT

## 2024-10-01 PROCEDURE — 94003 VENT MGMT INPAT SUBQ DAY: CPT

## 2024-10-01 PROCEDURE — 700111 HCHG RX REV CODE 636 W/ 250 OVERRIDE (IP): Mod: JZ | Performed by: INTERNAL MEDICINE

## 2024-10-01 PROCEDURE — 83880 ASSAY OF NATRIURETIC PEPTIDE: CPT

## 2024-10-01 PROCEDURE — 700102 HCHG RX REV CODE 250 W/ 637 OVERRIDE(OP): Performed by: INTERNAL MEDICINE

## 2024-10-01 PROCEDURE — 83986 ASSAY PH BODY FLUID NOS: CPT

## 2024-10-01 PROCEDURE — 80053 COMPREHEN METABOLIC PANEL: CPT

## 2024-10-01 PROCEDURE — 93010 ELECTROCARDIOGRAM REPORT: CPT | Mod: 59 | Performed by: INTERNAL MEDICINE

## 2024-10-01 PROCEDURE — 93010 ELECTROCARDIOGRAM REPORT: CPT | Mod: 59,76 | Performed by: INTERNAL MEDICINE

## 2024-10-01 PROCEDURE — 84100 ASSAY OF PHOSPHORUS: CPT

## 2024-10-01 PROCEDURE — 99233 SBSQ HOSP IP/OBS HIGH 50: CPT | Mod: FS | Performed by: INTERNAL MEDICINE

## 2024-10-01 PROCEDURE — 87015 SPECIMEN INFECT AGNT CONCNTJ: CPT

## 2024-10-01 PROCEDURE — 770020 HCHG ROOM/CARE - TELE (206)

## 2024-10-01 PROCEDURE — A9270 NON-COVERED ITEM OR SERVICE: HCPCS | Performed by: NURSE PRACTITIONER

## 2024-10-01 PROCEDURE — 94799 UNLISTED PULMONARY SVC/PX: CPT

## 2024-10-01 PROCEDURE — 99291 CRITICAL CARE FIRST HOUR: CPT | Performed by: INTERNAL MEDICINE

## 2024-10-01 PROCEDURE — 83735 ASSAY OF MAGNESIUM: CPT

## 2024-10-01 PROCEDURE — 85025 COMPLETE CBC W/AUTO DIFF WBC: CPT

## 2024-10-01 PROCEDURE — 84157 ASSAY OF PROTEIN OTHER: CPT

## 2024-10-01 PROCEDURE — 87206 SMEAR FLUORESCENT/ACID STAI: CPT

## 2024-10-01 PROCEDURE — 87070 CULTURE OTHR SPECIMN AEROBIC: CPT

## 2024-10-01 PROCEDURE — 93005 ELECTROCARDIOGRAM TRACING: CPT | Performed by: NURSE PRACTITIONER

## 2024-10-01 PROCEDURE — 94150 VITAL CAPACITY TEST: CPT

## 2024-10-01 PROCEDURE — 83615 LACTATE (LD) (LDH) ENZYME: CPT

## 2024-10-01 PROCEDURE — 80061 LIPID PANEL: CPT

## 2024-10-01 PROCEDURE — 82945 GLUCOSE OTHER FLUID: CPT

## 2024-10-01 PROCEDURE — 89051 BODY FLUID CELL COUNT: CPT

## 2024-10-01 PROCEDURE — 99292 CRITICAL CARE ADDL 30 MIN: CPT | Performed by: INTERNAL MEDICINE

## 2024-10-01 PROCEDURE — 700111 HCHG RX REV CODE 636 W/ 250 OVERRIDE (IP): Mod: JZ | Performed by: NURSE PRACTITIONER

## 2024-10-01 PROCEDURE — 71045 X-RAY EXAM CHEST 1 VIEW: CPT

## 2024-10-01 PROCEDURE — 87205 SMEAR GRAM STAIN: CPT | Mod: 91

## 2024-10-01 PROCEDURE — 4410569 US-THORACENTESIS PUNCTURE

## 2024-10-01 PROCEDURE — 82150 ASSAY OF AMYLASE: CPT

## 2024-10-01 PROCEDURE — 83605 ASSAY OF LACTIC ACID: CPT

## 2024-10-01 PROCEDURE — 87102 FUNGUS ISOLATION CULTURE: CPT

## 2024-10-01 PROCEDURE — A9270 NON-COVERED ITEM OR SERVICE: HCPCS | Performed by: INTERNAL MEDICINE

## 2024-10-01 PROCEDURE — 82803 BLOOD GASES ANY COMBINATION: CPT

## 2024-10-01 PROCEDURE — 93306 TTE W/DOPPLER COMPLETE: CPT | Mod: 26 | Performed by: INTERNAL MEDICINE

## 2024-10-01 PROCEDURE — 93306 TTE W/DOPPLER COMPLETE: CPT

## 2024-10-01 PROCEDURE — 700102 HCHG RX REV CODE 250 W/ 637 OVERRIDE(OP): Performed by: NURSE PRACTITIONER

## 2024-10-01 PROCEDURE — 93005 ELECTROCARDIOGRAM TRACING: CPT | Performed by: INTERNAL MEDICINE

## 2024-10-01 PROCEDURE — 82962 GLUCOSE BLOOD TEST: CPT | Mod: 91

## 2024-10-01 PROCEDURE — 37799 UNLISTED PX VASCULAR SURGERY: CPT

## 2024-10-01 RX ORDER — INSULIN LISPRO 100 [IU]/ML
2-9 INJECTION, SOLUTION INTRAVENOUS; SUBCUTANEOUS
Status: DISCONTINUED | OUTPATIENT
Start: 2024-10-01 | End: 2024-10-01

## 2024-10-01 RX ORDER — IPRATROPIUM BROMIDE AND ALBUTEROL SULFATE 2.5; .5 MG/3ML; MG/3ML
3 SOLUTION RESPIRATORY (INHALATION)
Status: DISCONTINUED | OUTPATIENT
Start: 2024-10-01 | End: 2024-10-01

## 2024-10-01 RX ORDER — HYDROCHLOROTHIAZIDE 25 MG/1
12.5 TABLET ORAL
Status: DISCONTINUED | OUTPATIENT
Start: 2024-10-02 | End: 2024-10-01

## 2024-10-01 RX ORDER — BISACODYL 10 MG
10 SUPPOSITORY, RECTAL RECTAL
Status: DISCONTINUED | OUTPATIENT
Start: 2024-10-01 | End: 2024-10-02 | Stop reason: HOSPADM

## 2024-10-01 RX ORDER — FAMOTIDINE 20 MG/1
20 TABLET, FILM COATED ORAL EVERY 12 HOURS
Status: DISCONTINUED | OUTPATIENT
Start: 2024-10-01 | End: 2024-10-01

## 2024-10-01 RX ORDER — DIPHENHYDRAMINE HCL 25 MG
25 TABLET ORAL NIGHTLY PRN
Status: DISCONTINUED | OUTPATIENT
Start: 2024-10-01 | End: 2024-10-02 | Stop reason: HOSPADM

## 2024-10-01 RX ORDER — METOPROLOL SUCCINATE 25 MG/1
25 TABLET, EXTENDED RELEASE ORAL EVERY EVENING
Status: DISCONTINUED | OUTPATIENT
Start: 2024-10-01 | End: 2024-10-02 | Stop reason: HOSPADM

## 2024-10-01 RX ORDER — ASPIRIN 81 MG/1
81 TABLET ORAL DAILY
Status: DISCONTINUED | OUTPATIENT
Start: 2024-10-01 | End: 2024-10-02 | Stop reason: HOSPADM

## 2024-10-01 RX ORDER — PRAVASTATIN SODIUM 20 MG
40 TABLET ORAL NIGHTLY
Status: DISCONTINUED | OUTPATIENT
Start: 2024-10-01 | End: 2024-10-02 | Stop reason: HOSPADM

## 2024-10-01 RX ORDER — AMOXICILLIN 250 MG
2 CAPSULE ORAL 2 TIMES DAILY
Status: DISCONTINUED | OUTPATIENT
Start: 2024-10-01 | End: 2024-10-02 | Stop reason: HOSPADM

## 2024-10-01 RX ORDER — LOSARTAN POTASSIUM 50 MG/1
100 TABLET ORAL
Status: DISCONTINUED | OUTPATIENT
Start: 2024-10-02 | End: 2024-10-02 | Stop reason: HOSPADM

## 2024-10-01 RX ORDER — DEXTROSE MONOHYDRATE 25 G/50ML
25 INJECTION, SOLUTION INTRAVENOUS
Status: DISCONTINUED | OUTPATIENT
Start: 2024-10-01 | End: 2024-10-01

## 2024-10-01 RX ORDER — FUROSEMIDE 10 MG/ML
40 INJECTION INTRAMUSCULAR; INTRAVENOUS
Status: DISCONTINUED | OUTPATIENT
Start: 2024-10-01 | End: 2024-10-02 | Stop reason: HOSPADM

## 2024-10-01 RX ORDER — POTASSIUM CHLORIDE 1500 MG/1
20 TABLET, EXTENDED RELEASE ORAL DAILY
Status: DISCONTINUED | OUTPATIENT
Start: 2024-10-02 | End: 2024-10-01

## 2024-10-01 RX ORDER — POLYETHYLENE GLYCOL 3350 17 G/17G
1 POWDER, FOR SOLUTION ORAL
Status: DISCONTINUED | OUTPATIENT
Start: 2024-10-01 | End: 2024-10-02 | Stop reason: HOSPADM

## 2024-10-01 RX ORDER — AMLODIPINE BESYLATE 10 MG/1
10 TABLET ORAL EVERY EVENING
Status: DISCONTINUED | OUTPATIENT
Start: 2024-10-01 | End: 2024-10-01

## 2024-10-01 RX ORDER — FENOFIBRATE 134 MG/1
134 CAPSULE ORAL EVERY EVENING
Status: DISCONTINUED | OUTPATIENT
Start: 2024-10-01 | End: 2024-10-02 | Stop reason: HOSPADM

## 2024-10-01 RX ORDER — SPIRONOLACTONE 25 MG/1
25 TABLET ORAL
Status: DISCONTINUED | OUTPATIENT
Start: 2024-10-02 | End: 2024-10-02 | Stop reason: HOSPADM

## 2024-10-01 RX ORDER — ACETAMINOPHEN 325 MG/1
650 TABLET ORAL EVERY 6 HOURS PRN
Status: DISCONTINUED | OUTPATIENT
Start: 2024-10-01 | End: 2024-10-02 | Stop reason: HOSPADM

## 2024-10-01 RX ADMIN — NYSTATIN: 100000 POWDER TOPICAL at 05:55

## 2024-10-01 RX ADMIN — SENNOSIDES AND DOCUSATE SODIUM 2 TABLET: 50; 8.6 TABLET ORAL at 17:10

## 2024-10-01 RX ADMIN — METOPROLOL SUCCINATE 25 MG: 25 TABLET, EXTENDED RELEASE ORAL at 17:10

## 2024-10-01 RX ADMIN — FUROSEMIDE 40 MG: 10 INJECTION, SOLUTION INTRAVENOUS at 05:50

## 2024-10-01 RX ADMIN — FENOFIBRATE 134 MG: 134 CAPSULE ORAL at 17:10

## 2024-10-01 RX ADMIN — PROPOFOL 10 MCG/KG/MIN: 10 INJECTION, EMULSION INTRAVENOUS at 01:45

## 2024-10-01 RX ADMIN — SENNOSIDES AND DOCUSATE SODIUM 2 TABLET: 50; 8.6 TABLET ORAL at 05:51

## 2024-10-01 RX ADMIN — ACETAMINOPHEN 650 MG: 325 TABLET ORAL at 12:20

## 2024-10-01 RX ADMIN — FAMOTIDINE 20 MG: 20 TABLET, FILM COATED ORAL at 05:50

## 2024-10-01 RX ADMIN — HYDROCHLOROTHIAZIDE 12.5 MG: 25 TABLET ORAL at 05:51

## 2024-10-01 RX ADMIN — POTASSIUM CHLORIDE 20 MEQ: 1500 TABLET, EXTENDED RELEASE ORAL at 05:50

## 2024-10-01 RX ADMIN — DIPHENHYDRAMINE HYDROCHLORIDE 25 MG: 25 TABLET ORAL at 21:37

## 2024-10-01 RX ADMIN — ASPIRIN 81 MG: 81 TABLET, COATED ORAL at 13:21

## 2024-10-01 RX ADMIN — POTASSIUM BICARBONATE 50 MEQ: 978 TABLET, EFFERVESCENT ORAL at 10:17

## 2024-10-01 RX ADMIN — FUROSEMIDE 40 MG: 10 INJECTION, SOLUTION INTRAVENOUS at 16:30

## 2024-10-01 RX ADMIN — PRAVASTATIN SODIUM 40 MG: 20 TABLET ORAL at 21:37

## 2024-10-01 RX ADMIN — FENTANYL CITRATE 50 MCG: 50 INJECTION, SOLUTION INTRAMUSCULAR; INTRAVENOUS at 00:39

## 2024-10-01 ASSESSMENT — COGNITIVE AND FUNCTIONAL STATUS - GENERAL
MOVING TO AND FROM BED TO CHAIR: A LOT
MOBILITY SCORE: 14
DRESSING REGULAR UPPER BODY CLOTHING: A LOT
STANDING UP FROM CHAIR USING ARMS: A LITTLE
WALKING IN HOSPITAL ROOM: A LITTLE
CLIMB 3 TO 5 STEPS WITH RAILING: A LITTLE
TOILETING: A LOT
MOVING FROM LYING ON BACK TO SITTING ON SIDE OF FLAT BED: A LOT
DAILY ACTIVITIY SCORE: 21
TURNING FROM BACK TO SIDE WHILE IN FLAT BAD: A LITTLE
DRESSING REGULAR LOWER BODY CLOTHING: A LOT
MOVING FROM LYING ON BACK TO SITTING ON SIDE OF FLAT BED: A LITTLE
WALKING IN HOSPITAL ROOM: A LOT
MOVING TO AND FROM BED TO CHAIR: A LITTLE
SUGGESTED CMS G CODE MODIFIER DAILY ACTIVITY: CJ
HELP NEEDED FOR BATHING: A LOT
CLIMB 3 TO 5 STEPS WITH RAILING: A LOT
MOBILITY SCORE: 19
DAILY ACTIVITIY SCORE: 12
EATING MEALS: A LOT
SUGGESTED CMS G CODE MODIFIER MOBILITY: CK
TOILETING: A LITTLE
STANDING UP FROM CHAIR USING ARMS: A LITTLE
PERSONAL GROOMING: A LOT
DRESSING REGULAR LOWER BODY CLOTHING: A LITTLE
HELP NEEDED FOR BATHING: A LITTLE
SUGGESTED CMS G CODE MODIFIER MOBILITY: CL
SUGGESTED CMS G CODE MODIFIER DAILY ACTIVITY: CL

## 2024-10-01 ASSESSMENT — LIFESTYLE VARIABLES
AVERAGE NUMBER OF DAYS PER WEEK YOU HAVE A DRINK CONTAINING ALCOHOL: 1
TOTAL SCORE: 0
CONSUMPTION TOTAL: NEGATIVE
HOW MANY TIMES IN THE PAST YEAR HAVE YOU HAD 5 OR MORE DRINKS IN A DAY: 0
HAVE PEOPLE ANNOYED YOU BY CRITICIZING YOUR DRINKING: NO
HAVE YOU EVER FELT YOU SHOULD CUT DOWN ON YOUR DRINKING: NO
ON A TYPICAL DAY WHEN YOU DRINK ALCOHOL HOW MANY DRINKS DO YOU HAVE: 1
EVER HAD A DRINK FIRST THING IN THE MORNING TO STEADY YOUR NERVES TO GET RID OF A HANGOVER: NO
TOTAL SCORE: 0
ALCOHOL_USE: YES
EVER FELT BAD OR GUILTY ABOUT YOUR DRINKING: NO
TOTAL SCORE: 0

## 2024-10-01 ASSESSMENT — PAIN DESCRIPTION - PAIN TYPE
TYPE: ACUTE PAIN
TYPE: ACUTE PAIN;SURGICAL PAIN
TYPE: ACUTE PAIN

## 2024-10-01 ASSESSMENT — PATIENT HEALTH QUESTIONNAIRE - PHQ9
2. FEELING DOWN, DEPRESSED, IRRITABLE, OR HOPELESS: NOT AT ALL
SUM OF ALL RESPONSES TO PHQ9 QUESTIONS 1 AND 2: 0
1. LITTLE INTEREST OR PLEASURE IN DOING THINGS: NOT AT ALL

## 2024-10-01 ASSESSMENT — ENCOUNTER SYMPTOMS
VOICE CHANGE: 0
VOMITING: 0
HEADACHES: 0
PALPITATIONS: 0
CHEST TIGHTNESS: 0
FEVER: 0
COUGH: 1
CHILLS: 0

## 2024-10-01 ASSESSMENT — SOCIAL DETERMINANTS OF HEALTH (SDOH)
IN THE PAST 12 MONTHS, HAS THE ELECTRIC, GAS, OIL, OR WATER COMPANY THREATENED TO SHUT OFF SERVICE IN YOUR HOME?: NO
WITHIN THE PAST 12 MONTHS, YOU WORRIED THAT YOUR FOOD WOULD RUN OUT BEFORE YOU GOT THE MONEY TO BUY MORE: NEVER TRUE
WITHIN THE LAST YEAR, HAVE TO BEEN RAPED OR FORCED TO HAVE ANY KIND OF SEXUAL ACTIVITY BY YOUR PARTNER OR EX-PARTNER?: NO
WITHIN THE PAST 12 MONTHS, THE FOOD YOU BOUGHT JUST DIDN'T LAST AND YOU DIDN'T HAVE MONEY TO GET MORE: NEVER TRUE
WITHIN THE LAST YEAR, HAVE YOU BEEN HUMILIATED OR EMOTIONALLY ABUSED IN OTHER WAYS BY YOUR PARTNER OR EX-PARTNER?: NO
WITHIN THE LAST YEAR, HAVE YOU BEEN AFRAID OF YOUR PARTNER OR EX-PARTNER?: NO
WITHIN THE LAST YEAR, HAVE YOU BEEN KICKED, HIT, SLAPPED, OR OTHERWISE PHYSICALLY HURT BY YOUR PARTNER OR EX-PARTNER?: NO

## 2024-10-01 ASSESSMENT — FIBROSIS 4 INDEX
FIB4 SCORE: 5.45
FIB4 SCORE: 5.45

## 2024-10-01 ASSESSMENT — PULMONARY FUNCTION TESTS: FVC: 0.9

## 2024-10-01 NOTE — PROGRESS NOTES
Critical Care Progress Note    Date of admission  9/30/2024    Chief Complaint  79 y.o. male admitted 9/30/2024 for elective TAVR procedure    Hospital Course  Juan Manuel Martinez is a 79 y.o. male with a past medical history significant for prostate cancer s/p prostatectomy, hypertension, mitral regurgitation, type 2 diabetes, severe mitral stenosis, and severe aortic stenosis who presented on 9/30/2024 to undergo elective TAVR for critical aortic stenosis with decompensated heart failure.  The patient was recently referred to the cardiology service due to his valvular heart disease and decompensated heart failure.  At that time it was decided to start him on oral diuretics as well as undergo a elective TAVR.  The patient was admitted on 9/30 and intubated without any difficulty; however, was found to be hypoxic when laid down.  He required FiO2 of 100% and a PEEP of 10.  The TAVR was uncomplicated and successful however he does still have a  perivalvular leak.  The patient remained hemodynamically stable throughout the entire procedure.  He did receive 1 L of IV fluids.  He came back and was admitted to the ICU still intubated and sedated.  The patient remains on 100% FiO2 with a PEEP of 10.  A ASAD does show a left pleural effusion.  The critical care team will admit the patient to undergo diuresis and ventilator weaning.     Interval Problem Update  Reviewed last 24 hour events:   - no events overnight   - SAT-->following all commands, nodding appropriately   - calm and pleasant   - SR 70-80s   - -120s   - afebrile   - NPO   - UOP of 825cc overnight, Guevara   - BM pta   - edge of bed   - VD#2   - CXR(reviewed): improving bilateral opacities    - SBT for 1 hour: VC 0.9   - SCDs   - Hb 11   - platelets 177   - K 3.4   - creat 0.98       Review of Systems  Review of Systems   Unable to perform ROS: Intubated        Vital Signs for last 24 hours   Temp:  [35.7 °C (96.3 °F)-37.2 °C (99 °F)] 36.8 °C (98.2  °F)  Pulse:  [] 77  Resp:  [4-49] 16  BP: ()/(51-77) 112/64  SpO2:  [91 %-100 %] 99 %    Hemodynamic parameters for last 24 hours       Respiratory Information for the last 24 hours  Vent Mode: APVCMV  Rate (breaths/min): 16  Vt Target (mL): 440  PEEP/CPAP: 8  MAP: 12  Control VTE (exp VT): 442    Physical Exam   Physical Exam  Vitals and nursing note reviewed.   Constitutional:       General: He is not in acute distress.     Appearance: He is ill-appearing. He is not toxic-appearing.   HENT:      Head: Normocephalic and atraumatic.      Right Ear: External ear normal.      Left Ear: External ear normal.      Nose: Nose normal. No rhinorrhea.      Mouth/Throat:      Mouth: Mucous membranes are moist.      Comments: ETT in place  Eyes:      General: No scleral icterus.     Conjunctiva/sclera: Conjunctivae normal.      Pupils: Pupils are equal, round, and reactive to light.   Cardiovascular:      Rate and Rhythm: Normal rate and regular rhythm.      Heart sounds: No murmur heard.  Pulmonary:      Breath sounds: No wheezing.      Comments: Breathing comfortably on the vent, diminished breath sounds on the right  Abdominal:      Palpations: Abdomen is soft.      Tenderness: There is no abdominal tenderness. There is no guarding or rebound.   Genitourinary:     Comments: Guevara in place  Musculoskeletal:         General: Normal range of motion.      Cervical back: Normal range of motion and neck supple.      Right lower leg: Edema present.      Left lower leg: Edema present.   Lymphadenopathy:      Cervical: No cervical adenopathy.   Skin:     General: Skin is warm and dry.      Capillary Refill: Capillary refill takes less than 2 seconds.      Findings: No rash.   Neurological:      Mental Status: He is alert.      Cranial Nerves: No cranial nerve deficit.      Sensory: No sensory deficit.      Motor: No weakness.      Comments: Following all commands, nodding to all questions    Psychiatric:      Comments:  Unable to assess         Medications  Current Facility-Administered Medications   Medication Dose Route Frequency Provider Last Rate Last Admin    potassium bicarbonate (Klyte) effervescent tablet 50 mEq  50 mEq Enteral Tube Once Jerrell Eller M.D.        albuterol (Proventil) 2.5mg/0.5ml nebulizer solution 2.5 mg  2.5 mg Inhalation Q10 MIN PRN Jose E Alfaro M.D.        amLODIPine (Norvasc) tablet 10 mg  10 mg Enteral Tube Q EVENING Kori Napoles, A.P.R.N.   10 mg at 09/30/24 2052    fenofibrate micronized (Lofibra) capsule 134 mg  134 mg Enteral Tube Q EVENING Kori Napoles, A.P.R.N.   134 mg at 09/30/24 1754    pravastatin (Pravachol) tablet 40 mg  40 mg Enteral Tube Nightly Kroi Napoles A.P.R.N.   40 mg at 09/30/24 2052    hydrALAZINE (Apresoline) injection 10 mg  10 mg Intravenous Q30 MIN PRN Kori Napoles A.P.R.N.        acetaminophen (Tylenol) tablet 650 mg  650 mg Enteral Tube Q6HRS PRN Kori Napoles, A.P.R.N.        diphenhydrAMINE (Benadryl) tablet/capsule 25 mg  25 mg Enteral Tube HS PRN Kori Napoles, A.P.R.N.        ondansetron (Zofran) syringe/vial injection 4 mg  4 mg Intravenous Q4HRS PRN Kori Napoles, A.P.R.N.        diphenhydrAMINE (Benadryl) injection 25 mg  25 mg Intravenous Q6HRS PRN Kori Napoles, A.P.R.N.        potassium chloride SA (Kdur) tablet 20 mEq  20 mEq Enteral Tube DAILY Kori Napoles A.P.R.N.   20 mEq at 10/01/24 0550    nystatin (Mycostatin) powder   Topical BID Kori Napoles A.P.R.N.   Given at 10/01/24 0555    Respiratory Therapy Consult   Nebulization Continuous RT Laurie Neal M.D.        famotidine (Pepcid) tablet 20 mg  20 mg Enteral Tube Q12HRS Laurie Neal M.D.   20 mg at 10/01/24 0550    Or    famotidine (Pepcid) injection 20 mg  20 mg Intravenous Q12HRS Laurie Neal M.D.        senna-docusate (Pericolace Or Senokot S) 8.6-50 MG per tablet 2 Tablet  2 Tablet Enteral Tube BID Laurie CAPONE  EDITA Neal   2 Tablet at 10/01/24 0551    And    polyethylene glycol/lytes (Miralax) Packet 1 Packet  1 Packet Enteral Tube QDAY PRN Laurie Neal M.D.        And    magnesium hydroxide (Milk Of Magnesia) suspension 30 mL  30 mL Enteral Tube QDAY PRN Laurie Neal M.D.        And    bisacodyl (Dulcolax) suppository 10 mg  10 mg Rectal QDAY PRN Laurie Neal M.D. MD Alert...ICU Electrolyte Replacement per Pharmacy   Other PHARMACY TO DOSE Laurie Neal M.D.        lidocaine (Xylocaine) 1 % injection 2 mL  2 mL Tracheal Tube Q30 MIN PRN Laurie Neal M.D.        fentaNYL (Sublimaze) injection 50 mcg  50 mcg Intravenous Q15 MIN PRN Laurie Neal M.D.   50 mcg at 10/01/24 0039    And    fentaNYL (Sublimaze) injection 100 mcg  100 mcg Intravenous Q15 MIN PRN Laurie Neal M.D.        And    fentaNYL (SUBLIMAZE) 50 mcg/mL in 50mL (Continuous Infusion)   Intravenous Continuous Laurie Neal M.D.        And    propofol (DIPRIVAN) injection  0-40 mcg/kg/min (Ideal) Intravenous Continuous Laurie Neal M.D.   Stopped at 10/01/24 0612    ipratropium-albuterol (DUONEB) nebulizer solution  3 mL Nebulization Q2HRS PRN (RT) Laurie Neal M.D.        insulin lispro (HumaLOG,AdmeLOG) subcutaneous injection  2-9 Units Subcutaneous Q6HRS Laurie Neal M.D.   2 Units at 09/30/24 1545    And    dextrose 50% (D50W) injection 25 g  25 g Intravenous Q15 MIN PRN Laurie Neal M.D.        enoxaparin (Lovenox) inj 40 mg  40 mg Subcutaneous DAILY AT 1800 Laurie Neal M.D.   40 mg at 09/30/24 1754    Pharmacy Consult: Enteral tube insertion - review meds/change route/product selection   Other PHARMACY TO DOSE Laurie Neal M.D.        losartan (Cozaar) tablet 100 mg  100 mg Enteral Tube Q DAY Laurie Neal M.D.        hydroCHLOROthiazide tablet 12.5 mg  12.5 mg Enteral Tube Q DAY Laurie Neal M.D.   12.5 mg at 10/01/24 0551    furosemide (Lasix) injection 40 mg  40 mg  Intravenous Q DAY Laurie Neal M.D.   40 mg at 10/01/24 0550       Fluids    Intake/Output Summary (Last 24 hours) at 10/1/2024 0705  Last data filed at 10/1/2024 0623  Gross per 24 hour   Intake 1389.37 ml   Output 1750 ml   Net -360.63 ml       Laboratory  Recent Labs     09/30/24  1200 10/01/24  0323   ISTATAPH 7.339* 7.457   ISTATAPCO2 42.4* 30.6   ISTATAPO2 142* 149*   ISTATATCO2 24 22   EYAXYNW4MLV 99 99   ISTATARTHCO3 22.8 21.6   ISTATARTBE -3 -2   ISTATTEMP 35.6 C 36.8 C   ISTATFIO2 100 50   ISTATSPEC Arterial Arterial   ISTATAPHTC 7.359* 7.460   TWBVMJNY3PL 134* 148*         Recent Labs     09/30/24  1125 10/01/24  0422   SODIUM 140 142   POTASSIUM 3.8 3.4*   CHLORIDE 108 108   CO2 20 20   BUN 28* 28*   CREATININE 1.13 0.98   MAGNESIUM 2.0 2.2   PHOSPHORUS 4.2 3.9   CALCIUM 8.7 8.6     Recent Labs     09/30/24  1125 10/01/24  0422   ALTSGPT 24 30   ASTSGOT 34 66*   ALKPHOSPHAT 47 45   TBILIRUBIN 0.4 0.5   GLUCOSE 157* 139*     Recent Labs     09/30/24  1125 10/01/24  0422   WBC 14.9* 13.5*   NEUTSPOLYS 87.10* 79.50*   LYMPHOCYTES 3.70* 5.90*   MONOCYTES 7.30 13.80*   EOSINOPHILS 0.40 0.00   BASOPHILS 0.30 0.10   ASTSGOT 34 66*   ALTSGPT 24 30   ALKPHOSPHAT 47 45   TBILIRUBIN 0.4 0.5     Recent Labs     09/30/24  1125 10/01/24  0422   RBC 3.82* 3.61*   HEMOGLOBIN 11.6* 11.0*   HEMATOCRIT 35.7* 32.9*   PLATELETCT 198 177   PROTHROMBTM 14.9*  --    INR 1.17*  --        Imaging  Reviewed     Assessment/Plan  * S/P TAVR (transcatheter aortic valve replacement)  Assessment & Plan  9/30/24: Successful transcatheter aortic valve replacement using 26 mm Grady gonzález 3  Ultra valve with moderate PVL      Acute on chronic respiratory failure with hypoxia (HCC)- (present on admission)  Assessment & Plan  Due to flash cardiogenic pulmonary edema due to severe valve disease  Intubated for TAVR procedure on 9/30  Cont full vent support  RT/O2 protocols  Cont vent bundle protocols  Lung protective ventilator  strategies with high PEEP and low tidal volume ventilation  Continue forced diuresis with Lasix 40mg IV BID  SAT/SBTs  Eval for left pleural effusion and need for thoracentesis    Acute on 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)- (present on admission)  Assessment & Plan  Continue forced diuresis with Lasix  S/p TAVR with Dr. Weinstein and Dr. Eller  Cont to have MR and mitral stenosis    Elevated left ventricular end-diastolic pressure (LVEDP)- (present on admission)  Assessment & Plan  Likely due to flash pulmonary edema  Continue forced diuresis with Lasix 40mg IV BID    History of malignant neoplasm of prostate- (present on admission)  Assessment & Plan  Hx of    Severe mitral valve stenosis- (present on admission)  Assessment & Plan  SBP goals < 160  Continue diuresis with Lasix    Type 2 diabetes mellitus with hyperglycemia, without long-term current use of insulin (HCC)- (present on admission)  Assessment & Plan  BG goals 120-180s, well controlled   Medium ISS  Holding home metformin  Hypoglycemia protocols    Primary hypertension- (present on admission)  Assessment & Plan  SBP goals < 160, controlled   Cont home amlodipine 10mg PO daily  Cont losartan-HCTZ 100-12.5mg PO daily  PRN hydralazine         VTE:   SCDs  Ulcer: H2 Antagonist  Lines: Guevara Catheter  Ongoing indication addressed    I have performed a physical exam and reviewed and updated ROS and Plan today (10/1/2024). In review of yesterday's note (9/30/2024), there are no changes except as documented above.     Discussed patient condition and risk of morbidity and/or mortality with Family, RN, RT, Pharmacy, Charge nurse / hot rounds, Patient, and cardiology    The patient remains critically ill.  I have assessed and reassessed the respiratory status and made ventilator adjustments based upon arterial blood gas analysis, ventilator waveforms and airway mechanics.  I have assessed and reassessed the blood pressure,  hemodynamics, cardiovascular status. This patient remains at high risk for worsening cardiopulmonary dysfunction and death without the above critical care interventions.    Critical care time = 105 minutes in directly providing and coordinating critical care and extensive data review.  No time overlap and excludes procedures.

## 2024-10-01 NOTE — PROGRESS NOTES
Report given to T8 RN. Pt up to T8 with family and belongings. Some belongings found to be in preop and they were to take up to new pts room. T8 RN and staff at bedside to receive pt and take over care.

## 2024-10-01 NOTE — PROGRESS NOTES
Cardiology Follow Up Progress Note    Date of Service  10/1/2024    Attending Physician  Jerrell Eller M.D.    Chief Complaint   Elective TAVR    HPI  Juan Manuel Martinez is a 79 y.o. male admitted 9/30/2024 for elective TAVR.     Hx of severe symptomatic aortic stenosis, severe mitral stenosis, acute on chronic heart failure with reduced EF of 70%, acute on chronic respiratory failure, HTN, HLD with CAD non-obstructive, prostate cancer, and DM type II.    Interim Events  POD 0: Upon lying the patient flat in the OR he became hypoxic and CXR revealed bilateral pleural effusions. Patient remained intubated.   POD1 : Patient was extubated and is in no acute respiratory distress. Was able to get up and transfer from the bed to a chair with no difficulty. Family at bedside. Patient reports feeling much improved and is breathing with no difficulty. Family and patient made aware for continued hospital admission and are agreeable.     Review of Systems  Review of Systems   Constitutional:  Negative for chills and fever.   HENT:  Negative for voice change.    Respiratory:  Positive for cough. Negative for chest tightness.    Cardiovascular:  Positive for leg swelling. Negative for chest pain and palpitations.   Gastrointestinal:  Negative for vomiting.   Genitourinary:  Positive for frequency.   Neurological:  Negative for headaches.       Vital signs in last 24 hours  Temp:  [35.7 °C (96.3 °F)-37.2 °C (99 °F)] 36.8 °C (98.2 °F)  Pulse:  [] 94  Resp:  [4-49] 30  BP: ()/(51-75) 102/58  SpO2:  [96 %-99 %] 99 %    Physical Exam  Physical Exam  Constitutional:       Appearance: Normal appearance.   HENT:      Head: Normocephalic.   Eyes:      Extraocular Movements: Extraocular movements intact.   Cardiovascular:      Rate and Rhythm: Normal rate and regular rhythm.   Pulmonary:      Breath sounds: Decreased air movement (L>R) present. Rales (lower lung fields) present.   Musculoskeletal:      Cervical back:  Neck supple.      Right lower leg: Edema (2-3+ pitting) present.      Left lower leg: Edema (2-3+ pitting) present.   Skin:     General: Skin is warm.      Capillary Refill: Capillary refill takes less than 2 seconds.      Comments: Catheter sites to bilateral groin appears clean and dry with no discharge or wound dehiscence. Nystatin powder overlying the areas.    Neurological:      General: No focal deficit present.      Mental Status: He is alert and oriented to person, place, and time.   Psychiatric:         Mood and Affect: Mood normal.         Behavior: Behavior normal.         Thought Content: Thought content normal.         Judgment: Judgment normal.         Lab Review  Lab Results   Component Value Date/Time    WBC 13.5 (H) 10/01/2024 04:22 AM    RBC 3.61 (L) 10/01/2024 04:22 AM    HEMOGLOBIN 11.0 (L) 10/01/2024 04:22 AM    HEMATOCRIT 32.9 (L) 10/01/2024 04:22 AM    MCV 91.1 10/01/2024 04:22 AM    MCH 30.5 10/01/2024 04:22 AM    MCHC 33.4 10/01/2024 04:22 AM    MPV 11.0 10/01/2024 04:22 AM      Lab Results   Component Value Date/Time    SODIUM 142 10/01/2024 04:22 AM    POTASSIUM 3.4 (L) 10/01/2024 04:22 AM    CHLORIDE 108 10/01/2024 04:22 AM    CO2 20 10/01/2024 04:22 AM    GLUCOSE 139 (H) 10/01/2024 04:22 AM    BUN 28 (H) 10/01/2024 04:22 AM    CREATININE 0.98 10/01/2024 04:22 AM      Lab Results   Component Value Date/Time    ASTSGOT 66 (H) 10/01/2024 04:22 AM    ALTSGPT 30 10/01/2024 04:22 AM     Lab Results   Component Value Date/Time    CHOLSTRLTOT 145 10/01/2024 04:22 AM    LDL 75 10/01/2024 04:22 AM    HDL 41 10/01/2024 04:22 AM    TRIGLYCERIDE 143 10/01/2024 04:22 AM    TROPONINT 95 (H) 08/20/2024 06:57 AM       Recent Labs     09/30/24  1125 10/01/24  0422   NTPROBNP 3156* 1939*       Cardiac Imaging and Procedures Review  EKG:  My personal interpretation of the EKG dated 10/01/24 is sinus rhythm with a rate of 85 and LBBB.      Echocardiogram: Pending    Cardiac Catheterization:   9/23/24:  CORONARY ANGIOGRAPHY:  The left main coronary artery : Normal.  Large-caliber vessel that trifurcates to LAD, ramus intermedius and left circumflex.  Ramus intermedius: Diminutive-small in caliber vessel  The left anterior descending coronary artery : Large-caliber vessel with mild CAD (~ 40% prox/mid stenosis), gives rise to small D1 a large D2.  Transapical   The left circumflex coronary artery : Large-caliber dominant vessel that gives rise to small OM1, large OM 2, large OM 3 and a large left PDA.  Mild CAD  The right coronary artery  : Medium caliber nondominant vessel with mild CAD    Imaging  Chest X-Ray:  10/01/24:   IMPRESSION:  1.  Pulmonary edema and/or infiltrates are identified, which are somewhat decreased since the prior exam.  2.  Cardiomegaly  3.  Atherosclerosis     Assessment/Plan  1. S/P TAVR, NYHA III, severe mitral stenosis  -doing well post-op  -cont asa lifelong  -groins CDI with mild bruising and small nodule. Nystatin powder overlying a minimally erythematous area in the bilateral groin, L worse than R.   -SBE prophylaxis understands  -echo 1 month with structural heart follow up  -reviewed hospital imaging, labs, and EKG  -cardiac rehab referral placed  -EKG shows sinus rhythm with rate of 85 and LBBB  -mod PVL noted, follow echo  -follow mitral stenosis with repeat echo today and 1 month post-op    2. HFrEF, Stage C , Class 3, LVEF 70%, and Elevated LVEDP of 42 mmHg.   -Heart failure due to valvular disease.  -Recent HF Hospitalization precipitant was due to elective TAVR (if Applicable)  -Discussed Heart failure trajectory and prognosis with patient. Will continue to optimize medical therapy as tolerated. Advanced HF treatment, need for remote monitoring consideration at every visit.   -ACE-I/ARB/ARNI: Continue Losartan 100 mg QD  -Evidence Based Beta-blocker: Start Toprolol 25 mg QD for tachycardia.   -Aldosterone Antagonist: Continue Spironolactone 25 mg QD.   -Diuretic:  Continue Lasix 40 mg IV BID  -SGLT2 inhibitor: Consider outpatient  -Labs: Labs Ordered, BNP and BMP to be done tomorrow  Patient diuresing well, 2.3L urine output since yesterday.  Left sided thoracentesis performed today. Continue O2 and ween per protocol.     3. Acute on Chronic Respiratory Failure  - Remains 93% 3L NC with no obvious respiratory distress or accessory muscle use. Reports feeling much improved today. Continue diuresis with daily CXR for monitoring. Perform incentive spirometry, encourage deep breathing, coughing, and ambulation. Continuing O2 therapy per RN protocol.    4. Hyperlipidemia  - LDL target < 70, continue Pravastatin 40 mg QHS and Lofibra 134 mg QHS. Follow outpatient close to goal.     5. CAD  - ASA 81 mg QD added, will follow up outpatient.     6. Type 2 DM  - Continue home regiment.     7. Primary HTN   - Stopped Amlodipine 10 mg QHS, started Metoprolol SR 25 mg QHS and Lasix 40 mg IV BID.      Thank you for allowing me to participate in the care of this patient.  I will continue to follow this patient    Please contact me with any questions.    Total rounding time spent 20 minutes.    SHAYLA Schwarz.   Cardiologist, [facility]  [phone]

## 2024-10-01 NOTE — CARE PLAN
"  Problem: Hemodynamics  Goal: Patient's hemodynamics, fluid balance and neurologic status will be stable or improve  Outcome: Progressing     Problem: Respiratory  Goal: Patient will achieve/maintain optimum respiratory ventilation and gas exchange  Outcome: Progressing     Problem: Urinary - Renal Perfusion  Goal: Ability to achieve and maintain adequate renal perfusion and functioning will improve  Outcome: Progressing     Problem: Pain - Standard  Goal: Alleviation of pain or a reduction in pain to the patient’s comfort goal  Outcome: Progressing     Problem: Skin Integrity  Goal: Skin integrity is maintained or improved  Outcome: Progressing     Problem: Fall Risk  Goal: Patient will remain free from falls  Outcome: Progressing   The patient is Watcher - Medium risk of patient condition declining or worsening    Shift Goals  Clinical Goals: Monitor VS and labs  Patient Goals: get better  Family Goals: at bedside    Progress made toward(s) clinical / shift goals:   patient is status post TAVR with complications and extubated today and downgraded to T8, patient was aox 4, discussed plan of care and verbalized understanding, oriented to his room and use of call light, bed alarm kept on,/76   Pulse 93   Temp 36.2 °C (97.2 °F) (Temporal)   Resp 20   Ht 1.778 m (5' 10\")   Wt 80.3 kg (177 lb 0.5 oz)   SpO2 96%           "

## 2024-10-01 NOTE — THERAPY
Physical Therapy Contact Note    Patient Name: Juan Manuel Martinez  Age:  79 y.o., Sex:  male  Medical Record #: [id number]  Today's Date: 10/1/2024    Discussed missed therapy with RN       10/01/24 1210   Interdisciplinary Plan of Care Collaboration   Collaboration Comments PT eval attempted, hold for now per  nsg due to pt just went into Aflutter. Ok to check back later.

## 2024-10-01 NOTE — CARE PLAN
The patient is Watcher - Medium risk of patient condition declining or worsening    Shift Goals  Clinical Goals: diuresis, wean fio2, pain control  Patient Goals: comfort and tube out  Family Goals: updates and sedation    Progress made toward(s) clinical / shift goals:    Problem: Safety - Medical Restraint  Goal: Remains free of injury from restraints (Restraint for Interference with Medical Device)  Outcome: Progressing  Note: Patient assessed frequently for needs. Circulatory status checked and patient repositioned frequently. Restraint charting and necessity of restraints documented Q2 hours.     Problem: Pain - Standard  Goal: Alleviation of pain or a reduction in pain to the patient’s comfort goal  Outcome: Progressing  Note: Pain assessed using the cpot pain scale. Pt experiencing acute pain in throat and chronic in LLE and medicated per MAR. Pt educated on non-pharmacological interventions including repositioning, distraction and heat/cold packs.

## 2024-10-01 NOTE — PROGRESS NOTES
Reached out to BASIA Napoles about pt HR going up from 80 - 90s to 115s-120s. Uncertain of SR or aflutter. Pt now reporting slight chest discomfort .Other VSS stable. No obsewrved distress. Orders received for stat EKG and to notify if HR sustains at or above 120, also notify if any afib/flutter on EKG.

## 2024-10-01 NOTE — PROGRESS NOTES
4 Eyes Skin Assessment Completed by DONNELL chin and DONNELL coronado.    Head Scab right frontal lobe  Ears WDL  Nose WDL  Mouth WDL  Neck WDL  Breast/Chest WDL  Shoulder Blades WDL  Spine WDL  (R) Arm/Elbow/Hand WDL dry elbow  (L) Arm/Elbow/Hand WDL dry ebows  Abdomen WDL  Groin Redness and Incision, right groin site, left groin excoriation  Scrotum/Coccyx/Buttocks WDL  (R) Leg Edema mild  (L) Leg Edema mild  (R) Heel/Foot/Toe Redness, Blanching, and Discoloration dusky feet, dry flaky skin  (L) Heel/Foot/Toe Redness, Blanching, and Discoloration, dry flaky skin.          Devices In Places Tele Box, Blood Pressure Cuff, and Nasal Cannula      Interventions In Place Gray Ear Foams, Sacral Mepilex, and Pillows    Possible Skin Injury No    Pictures Uploaded Into Epic N/A  Wound Consult Placed N/A  RN Wound Prevention Protocol Ordered No

## 2024-10-01 NOTE — CARE PLAN
Problem: Ventilation  Goal: Ability to achieve and maintain unassisted ventilation or tolerate decreased levels of ventilator support  Description: Target End Date:  4 days     Document on Vent flowsheet    1.  Support and monitor invasive and noninvasive mechanical ventilation  2.  Monitor ventilator weaning response  3.  Perform ventilator associated pneumonia prevention interventions  4.  Manage ventilation therapy by monitoring diagnostic test results  Outcome: Met     Pt extubated @0725 and placed on 3L NC. No respiratory distress noted. No stridor present at this time. Bilateral breath sounds.

## 2024-10-01 NOTE — CARE PLAN
Problem: Ventilation  Goal: Ability to achieve and maintain unassisted ventilation or tolerate decreased levels of ventilator support  Description: Target End Date:  4 days     Document on Vent flowsheet    1.  Support and monitor invasive and noninvasive mechanical ventilation  2.  Monitor ventilator weaning response  3.  Perform ventilator associated pneumonia prevention interventions  4.  Manage ventilation therapy by monitoring diagnostic test results  Outcome: Progressing     Ventilator Daily Summary    Vent Day #2  Airway: 8.0/23    Ventilator settings: 16/440/+8/40%  Weaning trials:   Respiratory Procedures:     Plan: Continue current ventilator settings and wean mechanical ventilation as tolerated per physician orders.

## 2024-10-01 NOTE — HOSPITAL COURSE
Juan Manuel Martinez is a 79 y.o. male with a past medical history significant for prostate cancer s/p prostatectomy, hypertension, mitral regurgitation, type 2 diabetes, severe mitral stenosis, and severe aortic stenosis who presented on 9/30/2024 to undergo elective TAVR for critical aortic stenosis with decompensated heart failure.  The patient was recently referred to the cardiology service due to his valvular heart disease and decompensated heart failure.  At that time it was decided to start him on oral diuretics as well as undergo a elective TAVR.  The patient was admitted on 9/30 and intubated without any difficulty; however, was found to be hypoxic when laid down.  He required FiO2 of 100% and a PEEP of 10.  The TAVR was uncomplicated and successful however he does still have a  perivalvular leak.  The patient remained hemodynamically stable throughout the entire procedure.  He did receive 1 L of IV fluids.  He came back and was admitted to the ICU still intubated and sedated.  The patient remains on 100% FiO2 with a PEEP of 10.  A ASAD does show a left pleural effusion.  The critical care team will admit the patient to undergo diuresis and ventilator weaning.

## 2024-10-02 ENCOUNTER — HOME HEALTH ADMISSION (OUTPATIENT)
Dept: HOME HEALTH SERVICES | Facility: HOME HEALTHCARE | Age: 79
End: 2024-10-02
Payer: MEDICARE

## 2024-10-02 ENCOUNTER — DOCUMENTATION (OUTPATIENT)
Dept: CARDIOLOGY | Facility: MEDICAL CENTER | Age: 79
End: 2024-10-02
Payer: MEDICARE

## 2024-10-02 ENCOUNTER — APPOINTMENT (OUTPATIENT)
Dept: RADIOLOGY | Facility: MEDICAL CENTER | Age: 79
DRG: 266 | End: 2024-10-02
Attending: INTERNAL MEDICINE
Payer: MEDICARE

## 2024-10-02 ENCOUNTER — PHARMACY VISIT (OUTPATIENT)
Dept: PHARMACY | Facility: MEDICAL CENTER | Age: 79
End: 2024-10-02
Payer: COMMERCIAL

## 2024-10-02 VITALS
RESPIRATION RATE: 82 BRPM | TEMPERATURE: 97.7 F | HEART RATE: 82 BPM | SYSTOLIC BLOOD PRESSURE: 104 MMHG | DIASTOLIC BLOOD PRESSURE: 60 MMHG | OXYGEN SATURATION: 96 % | WEIGHT: 174.16 LBS | BODY MASS INDEX: 24.93 KG/M2 | HEIGHT: 70 IN

## 2024-10-02 LAB
ALBUMIN SERPL BCP-MCNC: 3.5 G/DL (ref 3.2–4.9)
ALBUMIN/GLOB SERPL: 1.3 G/DL
ALP SERPL-CCNC: 50 U/L (ref 30–99)
ALT SERPL-CCNC: 33 U/L (ref 2–50)
ANION GAP SERPL CALC-SCNC: 13 MMOL/L (ref 7–16)
AST SERPL-CCNC: 56 U/L (ref 12–45)
BILIRUB SERPL-MCNC: 0.4 MG/DL (ref 0.1–1.5)
BUN SERPL-MCNC: 34 MG/DL (ref 8–22)
CALCIUM ALBUM COR SERPL-MCNC: 9.3 MG/DL (ref 8.5–10.5)
CALCIUM SERPL-MCNC: 8.9 MG/DL (ref 8.5–10.5)
CHLORIDE SERPL-SCNC: 99 MMOL/L (ref 96–112)
CO2 SERPL-SCNC: 29 MMOL/L (ref 20–33)
CREAT SERPL-MCNC: 1.09 MG/DL (ref 0.5–1.4)
EKG IMPRESSION: NORMAL
ERYTHROCYTE [DISTWIDTH] IN BLOOD BY AUTOMATED COUNT: 51 FL (ref 35.9–50)
GFR SERPLBLD CREATININE-BSD FMLA CKD-EPI: 68 ML/MIN/1.73 M 2
GLOBULIN SER CALC-MCNC: 2.8 G/DL (ref 1.9–3.5)
GLUCOSE SERPL-MCNC: 132 MG/DL (ref 65–99)
HCT VFR BLD AUTO: 35.3 % (ref 42–52)
HGB BLD-MCNC: 11.5 G/DL (ref 14–18)
MCH RBC QN AUTO: 30.5 PG (ref 27–33)
MCHC RBC AUTO-ENTMCNC: 32.6 G/DL (ref 32.3–36.5)
MCV RBC AUTO: 93.6 FL (ref 81.4–97.8)
MYCOBACTERIUM SPEC CULT: NORMAL
NT-PROBNP SERPL IA-MCNC: 1544 PG/ML (ref 0–125)
PLATELET # BLD AUTO: 180 K/UL (ref 164–446)
PMV BLD AUTO: 11.1 FL (ref 9–12.9)
POTASSIUM SERPL-SCNC: 3.8 MMOL/L (ref 3.6–5.5)
PROCALCITONIN SERPL-MCNC: 0.07 NG/ML
PROT SERPL-MCNC: 6.3 G/DL (ref 6–8.2)
RBC # BLD AUTO: 3.77 M/UL (ref 4.7–6.1)
RHODAMINE-AURAMINE STN SPEC: NORMAL
RHODAMINE-AURAMINE STN SPEC: NORMAL
SIGNIFICANT IND 70042: NORMAL
SIGNIFICANT IND 70042: NORMAL
SITE SITE: NORMAL
SITE SITE: NORMAL
SODIUM SERPL-SCNC: 141 MMOL/L (ref 135–145)
SOURCE SOURCE: NORMAL
SOURCE SOURCE: NORMAL
WBC # BLD AUTO: 13.8 K/UL (ref 4.8–10.8)

## 2024-10-02 PROCEDURE — 80053 COMPREHEN METABOLIC PANEL: CPT

## 2024-10-02 PROCEDURE — 97163 PT EVAL HIGH COMPLEX 45 MIN: CPT

## 2024-10-02 PROCEDURE — 93005 ELECTROCARDIOGRAM TRACING: CPT | Performed by: NURSE PRACTITIONER

## 2024-10-02 PROCEDURE — 700111 HCHG RX REV CODE 636 W/ 250 OVERRIDE (IP): Mod: JZ | Performed by: NURSE PRACTITIONER

## 2024-10-02 PROCEDURE — 97535 SELF CARE MNGMENT TRAINING: CPT

## 2024-10-02 PROCEDURE — A9270 NON-COVERED ITEM OR SERVICE: HCPCS | Performed by: INTERNAL MEDICINE

## 2024-10-02 PROCEDURE — 93010 ELECTROCARDIOGRAM REPORT: CPT | Performed by: INTERNAL MEDICINE

## 2024-10-02 PROCEDURE — 71045 X-RAY EXAM CHEST 1 VIEW: CPT

## 2024-10-02 PROCEDURE — 84145 PROCALCITONIN (PCT): CPT

## 2024-10-02 PROCEDURE — RXMED WILLOW AMBULATORY MEDICATION CHARGE: Performed by: NURSE PRACTITIONER

## 2024-10-02 PROCEDURE — 85027 COMPLETE CBC AUTOMATED: CPT

## 2024-10-02 PROCEDURE — 700102 HCHG RX REV CODE 250 W/ 637 OVERRIDE(OP): Performed by: INTERNAL MEDICINE

## 2024-10-02 PROCEDURE — 36415 COLL VENOUS BLD VENIPUNCTURE: CPT

## 2024-10-02 PROCEDURE — A9270 NON-COVERED ITEM OR SERVICE: HCPCS | Performed by: NURSE PRACTITIONER

## 2024-10-02 PROCEDURE — 700102 HCHG RX REV CODE 250 W/ 637 OVERRIDE(OP): Performed by: NURSE PRACTITIONER

## 2024-10-02 PROCEDURE — 99238 HOSP IP/OBS DSCHRG MGMT 30/<: CPT | Mod: FS | Performed by: INTERNAL MEDICINE

## 2024-10-02 PROCEDURE — 83880 ASSAY OF NATRIURETIC PEPTIDE: CPT

## 2024-10-02 RX ORDER — FUROSEMIDE 40 MG/1
40 TABLET ORAL 2 TIMES DAILY
Qty: 60 TABLET | Refills: 0 | Status: SHIPPED | OUTPATIENT
Start: 2024-10-02 | End: 2024-10-08

## 2024-10-02 RX ORDER — SPIRONOLACTONE 25 MG/1
25 TABLET ORAL DAILY
Qty: 30 TABLET | Refills: 3 | Status: SHIPPED | OUTPATIENT
Start: 2024-10-03 | End: 2024-10-08 | Stop reason: SDUPTHER

## 2024-10-02 RX ORDER — ASPIRIN 81 MG/1
81 TABLET ORAL EVERY EVENING
Qty: 30 TABLET | Refills: 0 | Status: SHIPPED | OUTPATIENT
Start: 2024-10-02

## 2024-10-02 RX ORDER — METOPROLOL SUCCINATE 25 MG/1
25 TABLET, EXTENDED RELEASE ORAL EVERY EVENING
Qty: 30 TABLET | Refills: 0 | Status: SHIPPED | OUTPATIENT
Start: 2024-10-02 | End: 2024-10-08 | Stop reason: SDUPTHER

## 2024-10-02 RX ORDER — NYSTATIN 100000 [USP'U]/G
1 POWDER TOPICAL 2 TIMES DAILY
Qty: 30 G | Refills: 0 | Status: SHIPPED | OUTPATIENT
Start: 2024-10-02

## 2024-10-02 RX ORDER — LOSARTAN POTASSIUM 100 MG/1
100 TABLET ORAL DAILY
Qty: 30 TABLET | Refills: 0 | Status: SHIPPED | OUTPATIENT
Start: 2024-10-03 | End: 2024-10-08 | Stop reason: SDUPTHER

## 2024-10-02 RX ADMIN — SPIRONOLACTONE 25 MG: 25 TABLET ORAL at 05:20

## 2024-10-02 RX ADMIN — LOSARTAN POTASSIUM 100 MG: 50 TABLET, FILM COATED ORAL at 05:20

## 2024-10-02 RX ADMIN — SENNOSIDES AND DOCUSATE SODIUM 2 TABLET: 50; 8.6 TABLET ORAL at 05:20

## 2024-10-02 RX ADMIN — ACETAMINOPHEN 650 MG: 325 TABLET ORAL at 12:21

## 2024-10-02 RX ADMIN — FUROSEMIDE 40 MG: 10 INJECTION, SOLUTION INTRAVENOUS at 05:20

## 2024-10-02 RX ADMIN — ASPIRIN 81 MG: 81 TABLET, COATED ORAL at 05:20

## 2024-10-02 ASSESSMENT — FIBROSIS 4 INDEX: FIB4 SCORE: 4.33

## 2024-10-02 ASSESSMENT — COGNITIVE AND FUNCTIONAL STATUS - GENERAL
SUGGESTED CMS G CODE MODIFIER MOBILITY: CJ
WALKING IN HOSPITAL ROOM: A LITTLE
MOBILITY SCORE: 22
CLIMB 3 TO 5 STEPS WITH RAILING: A LITTLE

## 2024-10-02 ASSESSMENT — GAIT ASSESSMENTS
GAIT LEVEL OF ASSIST: SUPERVISED
DISTANCE (FEET): 400
ASSISTIVE DEVICE: FRONT WHEEL WALKER
DEVIATION: BRADYKINETIC;SHUFFLED GAIT

## 2024-10-02 NOTE — DISCHARGE PLANNING
Received Choice Form @: 1009  Agency/Facility Name: [facility]   Referral Sent Per Choice Form @: 1009    Received Choice Form @: 1001  Agency/Facility Name: [facility]  Referral Sent Per Choice Form @: [id number] -[id number]  CARMITA spoke with Fatemeh from [facility]. Oxygen equipment is out for delivery, should be delivered to pt's room in 1-2 hours.

## 2024-10-02 NOTE — PROGRESS NOTES
Monitor Summary  Rhythm: NSR with BBB  Rate: 89-95   Ectopy: pvc's  .16 / .14 / .46

## 2024-10-02 NOTE — DOCUMENTATION QUERY
"                                                                         [facility]                                                                       Query Response Note      PATIENT:               JUAN ALBERTO SOLORZANO  ACCT #:                  [id number]  MRN:                     [id number]  :                      1945  ADMIT DATE:       2024 6:55 AM  DISCH DATE:          RESPONDING  PROVIDER #:        [id number]           QUERY TEXT:    There is conflicting documentation in the medical record.      Rheumatic disorders of both mitral and aortic valves is documented.    Nonrheumatic mitral regurgitation is documented in the medical record.      Based on treatment, clinical findings and risk factors, can this documentation be further clarified?    The patient's Clinical Indicators include:  80yo with dx of severe symptomatic aortic stenosis, acute on chronic diastolic heart failure with acute on chronic respiratory failure, nonobstructive CAD, severe mitral stenosis      Anesthesia Preprocedure Evaluation \"Nonrheumatic mitral valve regurgitation\"   H&P \"severe mitral stenosis\"    Risk factors: advanced age, HTN, DM2, acute on chronic systolic and diastolic heart failure, acute on chronic respiratory failure with hypoxia  Treatments: TAVR, imaging, labwork    If you agree with the above dx, please document in the medical record.     Contact me with questions.     Thank you,   Sandy Foley, KRYSTAL, CDI  [email or web address]  Options provided:   -- Nonrheumatic aortic and mitral stenosis   -- Rheumatic disorder of mitral and aortic valves   -- Other explanation   -- Unable to determine      Query created by: Sandy Foley on 10/2/2024 12:06 PM    RESPONSE TEXT:    Nonrheumatic aortic and mitral stenosis          Electronically signed by:  MICAELA SMART MD 10/2/2024 12:11 PM              "

## 2024-10-02 NOTE — PROGRESS NOTES
Bedside report received from off going RN: Grace, assumed care of patient.     Fall Risk Score: MODERATE RISK  Fall risk interventions in place: Place yellow fall risk ID band on patient, Provide patient/family education based on risk assessment, Educate patient/family to call staff for assistance when getting out of bed, Place fall precaution signage outside patient door, Place patient in room close to nursing station, Utilize bed/chair fall alarm, and Bed alarm connected correctly  Bed type: Regular (Jerome Score less than 17 interventions in place)  Patient on cardiac monitor: Yes  IVF/IV medications: Not Applicable   Oxygen: How many liters 2L, Traced the line to wall oxygen, and No oxygen tank in room  Bedside sitter: Not Applicable   Isolation: Not applicable

## 2024-10-02 NOTE — DISCHARGE PLANNING
Case Management Discharge Planning    Admission Date: 9/30/2024  GMLOS: 2.7  ALOS: 2    6-Clicks ADL Score: 21  6-Clicks Mobility Score: 19      Anticipated Discharge Dispo: Discharge Disposition: Discharged to home/self care (01)  Discharge Address: [address] [place] [address]  [place] [address]    DME Needed: No    Action(s) Taken: DC Assessment Complete (See below)    RN CM  met with pt and spouse at bedside to complete assessment and discuss discharge planning.Pt A&Ox4 and able to verify the information on the face sheet.     Pt lives with his spouse in a 2-story house, [place] (p) [phone]; Spouse and emergency contact.       Patient reports, prior to admission he is independent with ADL's and IADL’s.  Pt does not use any DME at baseline.      Pt reported that his spouse is good support for him. Pt receives monthly SSI deposits.      The patient's PCP is Dr. Francois Penaloza.  Patient's preferred pharmacy is [facility] located on [facility].      Patient denies a history of SNF/IPR nor HHC use in the past.  Patient reports, he occasionally will have an alcoholic beverage. Patient denies tobacco and admits to marijuana use.  Pt denies any SA or MH concerns.      Patients confirmed medical coverage via Medicare and [employer].  Patient has means to transportation and spouse will provide transport once medically stable for discharge.      RN CM discussed discharge planning.  Patient reported pt's goal is to return home once medically stable. Notified by bedside RN that patient will need home health services and home oxygen.     RN CM obtained DME and HH choice from patient at bedside. DPA to send referral out for home health and oxygen.     Patient's HH choice is 1-[facility]; 2- [facility]; 3- [facility]. DME choice is 1- [facility]; 2- [facility].     Escalations Completed: None    Medically Clear: No    Next Steps: F/U with DME and Home Health referral. Pt's spouse will provide transportation home.     Barriers to Discharge: Medical  clearance    Is the patient up for discharge tomorrow: No    Care Transition Team Assessment    Information Source  Orientation Level: Oriented X4  Information Given By: Patient  Informant's Name: Juan Manuel Martinez  Who is responsible for making decisions for patient? : Patient    Readmission Evaluation  Is this a readmission?: No    Elopement Risk  Legal Hold: No  Ambulatory or Self Mobile in Wheelchair: Yes  Disoriented: No  Psychiatric Symptoms: None  History of Wandering: No  Elopement this Admit: No  Vocalizing Wanting to Leave: No  Displays Behaviors, Body Language Wanting to Leave: No-Not at Risk for Elopement  Elopement Risk: Not at Risk for Elopement    Interdisciplinary Discharge Planning  Lives with - Patient's Self Care Capacity: Spouse  Patient or legal guardian wants to designate a caregiver: No  Support Systems: Spouse / Significant Other, Friends / Neighbors  Housing / Facility: [address] [facility] [facility]    Discharge Preparedness  What is your plan after discharge?: Home with help  What are your discharge supports?: Spouse  Prior Functional Level: Ambulatory, Drives Self, Independent with Activities of Daily Living, Independent with Medication Management  Difficulity with ADLs: None  Difficulity with IADLs: None    Functional Assesment  Prior Functional Level: Ambulatory, Drives Self, Independent with Activities of Daily Living, Independent with Medication Management    Finances  Financial Barriers to Discharge: No  Prescription Coverage: Yes    Vision / Hearing Impairment  Right Eye Vision: Impaired, Wears Glasses  Left Eye Vision: Impaired, Wears Glasses  Hearing Impairment: Both Ears, Hearing Device(s) Available    Advance Directive  Advance Directive?: None  Advance Directive offered?: AD Booklet refused    Domestic Abuse  Physical Abuse or Sexual Abuse: No  Verbal Abuse or Emotional Abuse: No  Possible Abuse/Neglect Reported to:: Not Applicable    Psychological Assessment  History of Substance Abuse:  Marijuana  History of Psychiatric Problems: No    Discharge Risks or Barriers  Discharge risks or barriers?: No  Patient risk factors: Complex medical needs    Anticipated Discharge Information  Discharge Disposition: Discharged to home/self care (01)  Discharge Address: [address] [place] [address]  [place] [address]

## 2024-10-02 NOTE — FACE TO FACE
"Face to Face Note  -  Durable Medical Equipment    GWENDOLYN Schwarz - NPI: [id number]  I certify that this patient is under my care and that they have had a durable medical equipment(DME)face to face encounter by myself that meets the physician DME face-to-face encounter requirements with this patient on:    Date of encounter:   Patient:                    MRN:                       YOB: 2025  Juan Manuel Martinez  [id number]  1945     The encounter with the patient was in whole, or in part, for the following medical condition, which is the primary reason for durable medical equipment:  CHF and Post-Op Surgery    I certify that, based on my findings, the following durable medical equipment is medically necessary:  Oxygen   HOME O2 Saturation Measurements:(Values must be present for Home Oxygen orders)  Room air sat at rest: 90  Room air sat with amb: 85  With liters of O2: 2, O2 sat at rest with O2: 93  With Liters of O2: 2, O2 sat with amb with O2 : 91  Is the patient mobile?: Yes  If patient feels more short of breath, they can go up to 6 liters per minute and contact healthcare provider.    Supporting Symptoms: The patient requires supplemental oxygen, as the following interventions have been tried with limited or no improvement: \"Ambulation with oximetry and \"Incentive spirometry.        ------------------------------------------------------------------------------------------------------------------    Face to Face Supporting Documentation - Home Health    The encounter with this patient was in whole or in part the primary reason for home health admission.    Date of encounter:   Patient:                    MRN:                       YOB: 2025  Juan Manuel Martinez  [id number]  1945     Home health to see patient for:  Physical Therapy evaluation and treatment    Skilled need for:  Exacerbation of Chronic Disease State heart failure and Recent " Deterioration of Health Status heart failure    Skilled nursing interventions to include:  Comment: home heart failure protocol    Homebound evidenced status by:  Need the aid of supportive devices such as crutches, canes, wheelchairs or walkers or Needs the assistance of another person in order to leave the home. Leaving home must require a considerable and taxing effort. There must exist a normal inability to leave the home.    Community Physician to provide follow up care: Francois Penaloza M.D.     Optional Interventions    Wound information & treatment:    Home Infusion Therapy orders:    Line/Drain/Airway:    I certify the face to face encounter for this home care referral meets the CMS requirements and the encounter/clinical assessment with the patient was, in whole, or in part, for the medical condition(s) listed above, which is the primary reason for home health care. Based on my clinical findings: the service(s) are medically necessary, support the need for home health care, and the homebound criteria are met.  I certify that this patient has had a face to face encounter by myself  .  Kori Napoles, FAITHR.N. - NPI: [id number]    *Debility, frailty and advanced age in the absence of an acute deterioration or exacerbation of a condition do not qualify a patient for home health.

## 2024-10-02 NOTE — PROGRESS NOTES
Patient being discharged. Pt educated on discharge instructions and new prescriptions, verbalized understanding. Follow up appointment made with Cardi and vascular health. PIV removed, monitor checked in. Patient going Home  via private vehicle with wife.

## 2024-10-02 NOTE — THERAPY
"Physical Therapy   Initial Evaluation     Patient Name: Juan Manuel Martinez  Age:  79 y.o., Sex:  male  Medical Record #: [id number]  Today's Date: 10/2/2024     Precautions  Precautions: Fall Risk;Cardiac Precautions (See Comments)  Comments: s/p TAVR    Assessment  Patient is 79 y.o. male presenting s/p TAVR 9/30. Per cardiology, \"Upon lying the patient flat in the OR he became hypoxic and CXR revealed bilateral pleural effusions. Patient remained intubated.\" Pt has since been extubated. Pt with PMH including acute on chronic heart failure with reduced EF of 70%, acute on chronic respiratory failure, HTN, HLD with CAD non-obstructive, prostate cancer, and DM2. Pt is independent with functional mobility at baseline using no AD. Lives in [address] [address] [place] with wife.     During current session, pt presents near functional baseline other than new O2 needs requiring overall SPV for mobility as detailed below. Able to walk 400 ft with FWW and navigate a full FOS, no LOB. Negative talk test. Pt did drop to 85-88% on RA during ambulation, replaced 2 L with return to low 90s%. RN notified. Encouraged pt to continue ambulating to the toilet and in the hallway with nursing as tolerated. Recommend d/c home with FWW and OPPT. Pt denies any mobility concerns with d/c home. Patient will not be actively followed for physical therapy services at this time, however may be seen if requested by physician for 1 more visit within 30 days to address any discharge or equipment needs.    Pt given \"Physical Activity after TAVR\" handout. Edu included:  *Home walking program and how to appropriately progress time/distance  *Progression of aerobic tolerance and how to self monitor including the Talk Test and RPE scale  *Signs and symptoms of cardiac failure and when to call 911  *Modifiable and non-modifiable cardiac risk factors  *Track vitals including BP, HR, and weight at home  *Outpatient cardiac rehab phase 2 if available  Pt was receptive to edu. "     Plan    Physical Therapy Initial Treatment Plan   Duration: Discharge Needs Only    DC Equipment Recommendations: Front-Wheel Walker (if pt does not already own one, reported that he will check with wife today to confirm)  Discharge Recommendations: Recommend outpatient physical therapy services to address higher level deficits       Subjective    Pt received resting in bed, agreeable to participate.      Objective       10/02/24 0859   Initial Contact Note    Initial Contact Note Order Received and Verified, Evaluation Only - Patient Does Not Require Further Acute Physical Therapy at this Time.  However, May Benefit from Post Acute Therapy for Higher Level Functional Deficits.   Precautions   Precautions Fall Risk;Cardiac Precautions (See Comments)   Comments s/p TAVR   Vitals   Pulse Oximetry (!) 85 %   O2 Delivery Device None - Room Air   Vitals Comments pt dropped to 85-88% on RA during ambulation, replaced 2 L with return to low 90s%. Pt asymptomatic. RN notified. Pt denied O2 use at baseline   Pain 0 - 10 Group   Therapist Pain Assessment Post Activity Pain Same as Prior to Activity;Nurse Notified  (no c/o pain)   Prior Living Situation   Prior Services Home-Independent   Housing / Facility 2 Story House   Steps Into Home 1   Steps In Home   (FOS)   Equipment Owned Single Point Cane  (may own FWW, pt unsure)   Lives with - Patient's Self Care Capacity Spouse   Comments Lives in [place] with wife. Pt and wife are both retired, wife worked as a RN   Prior Level of Functional Mobility   Bed Mobility Independent   Transfer Status Independent   Ambulation Independent   Ambulation Distance limited community   Assistive Devices Used None   Stairs Independent   History of Falls   History of Falls No   Date of Last Fall   (pt denied any falls)   Cognition    Cognition / Consciousness WDL   Level of Consciousness Alert   Comments pleasant and cooperative, receptive to edu   Passive ROM Lower Body   Passive ROM Lower  "Body WDL   Active ROM Lower Body    Active ROM Lower Body  WDL   Strength Lower Body   Lower Body Strength  X   Gross Strength Generalized Weakness, Equal Bilaterally   Comments pt reported LE feeling \"weak,\" functional for ambulation and stairs   Sensation Lower Body   Comments no c/o altered sensation BLE   Coordination Lower Body    Coordination Lower Body  WDL   Comments within age norms   Balance Assessment   Sitting Balance (Static) Fair +   Sitting Balance (Dynamic) Fair +   Standing Balance (Static) Fair   Standing Balance (Dynamic) Fair   Weight Shift Sitting Good   Weight Shift Standing Fair   Comments FWW   Bed Mobility    Supine to Sit Supervised   Scooting Supervised   Comments HOBE, up on toilet post   Gait Analysis   Gait Level Of Assist Supervised   Assistive Device Front Wheel Walker   Distance (Feet) 400   # of Times Distance was Traveled 1  (plus 100 ft)   Deviation Bradykinetic;Shuffled Gait   # of Stairs Climbed 14   Level of Assist with Stairs Supervised   Functional Mobility   Sit to Stand Supervised   Bed, Chair, Wheelchair Transfer Supervised   Toilet Transfers Supervised   Transfer Method Stand Step   Mobility bed>up around unit FWW>stairs>toilet   6 Clicks Assessment - How much HELP from from another person do you currently need... (If the patient hasn't done an activity recently, how much help from another person do you think he/she would need if he/she tried?)   Turning from your back to your side while in a flat bed without using bedrails? 4   Moving from lying on your back to sitting on the side of a flat bed without using bedrails? 4   Moving to and from a bed to a chair (including a wheelchair)? 4   Standing up from a chair using your arms (e.g., wheelchair, or bedside chair)? 4   Walking in hospital room? 3   Climbing 3-5 steps with a railing? 3   6 clicks Mobility Score 22   Education Group   Education Provided Role of Physical Therapist;Cardiac Precautions   Cardiac Precautions " Patient Response Patient;Acceptance;Explanation;Demonstration;Handout;Verbal Demonstration;Action Demonstration   Role of Physical Therapist Patient Response Patient;Acceptance;Explanation;Demonstration;Verbal Demonstration;Action Demonstration   Additional Comments also educated pt about IS use 10x/hr for improved breathing in setting of new O2 needs   Physical Therapy Initial Treatment Plan    Duration Discharge Needs Only   Problem List    Problems None   Anticipated Discharge Equipment and Recommendations   DC Equipment Recommendations Front-Wheel Walker  (if pt does not already own one, reported that he will check with wife today to confirm)   Discharge Recommendations Recommend outpatient physical therapy services to address higher level deficits   Interdisciplinary Plan of Care Collaboration   IDT Collaboration with  Nursing   Patient Position at End of Therapy Seated;Call Light within Reach;Other (Comments)  (cleared by RN to be left up on toilet)   Collaboration Comments RN updated   Session Information   Date / Session Number  10/2- d/c needs only

## 2024-10-02 NOTE — DISCHARGE PLANNING
ATTN: Case Management  RE: Referral for Home Health    As of 10/2/24, we have accepted the Home Health referral for the patient listed above.    A [facility] clinician will be out to see the patient within 48 hours. If you have any questions or concerns regarding the patient’s transition to Home Health, please do not hesitate to contact us at [phone].      We look forward to collaborating with you,  [facility] Team

## 2024-10-02 NOTE — DISCHARGE SUMMARY
PRIMARY DISCHARGE DIAGNOSIS: Status post transcatheter aortic valve replacement.    DISCHARGE DIAGNOSIS:  S/P TAVR    PROCEDURES/TESTIN. Successful transcatheter aortic valve replacement (TAVR) with #26 mm Grady Luciano 3 ultra Resilia valve, transfemoral approach under general anesthesia on 24  2. Intraoperative transesophageal echocardiogram showing 24 AV:  Bioprosthetic valve in place.  No evidence of trans-valvular   regurgitation.  There is moderate to severe para-valvular regurgitation   at the union of the left and non-coronary cusps.  There is no futher   evidence of severe aortic valve stenosis with a mean PG of 10 mmHg.  Other:  Moderate left sided pleural effusion, present prior to the   procedure.  3. Echocardiogram 10/1/24 showing: CONCLUSIONS  Mild concentric LVH with normal LV systolic function: LVEF 65%  Grade II LV diastolic dysfunction  Normal RV size and systolic function  Dilated left atrium  Well-seated bioprosthetic aortic valve that is functioning normally   with normal transvalvular gradients.  There appears to be at least moderate PVL on the apical 5 chamber view   and not as evident on the short axis views. TAVR team updated.  Severe mitral stenosis with at least moderate MR  Pulmonary hypertension with estimated RVSP 68mmHg  No pericardial effusion  Normal IVC size  4. CXR on 10/2/24 showing   1.  Pulmonary edema and/or infiltrates are identified, which appear somewhat increased since the prior exam.  2.  Cardiomegaly  3.  Atherosclerosis  5. EKG on 10/2/24 showing SR rate of 80 bpm  6. Thoracentesis, L side-1125 ml removal with no concerning clinical findings at this time  7. Labs   Lab Results   Component Value Date/Time    SODIUM 141 10/02/2024 12:40 AM    POTASSIUM 3.8 10/02/2024 12:40 AM    CHLORIDE 99 10/02/2024 12:40 AM    CO2 29 10/02/2024 12:40 AM    GLUCOSE 132 (H) 10/02/2024 12:40 AM    BUN 34 (H) 10/02/2024 12:40 AM    CREATININE 1.09 10/02/2024 12:40 AM        Lab Results   Component Value Date/Time    PROTHROMBTM 14.9 (H) 09/30/2024 11:25 AM    INR 1.17 (H) 09/30/2024 11:25 AM       Lab Results   Component Value Date/Time    WBC 13.8 (H) 10/02/2024 12:40 AM    RBC 3.77 (L) 10/02/2024 12:40 AM    HEMOGLOBIN 11.5 (L) 10/02/2024 12:40 AM    HEMATOCRIT 35.3 (L) 10/02/2024 12:40 AM    MCV 93.6 10/02/2024 12:40 AM    MCH 30.5 10/02/2024 12:40 AM    MCHC 32.6 10/02/2024 12:40 AM    MPV 11.1 10/02/2024 12:40 AM    NEUTSPOLYS 79.50 (H) 10/01/2024 04:22 AM    LYMPHOCYTES 5.90 (L) 10/01/2024 04:22 AM    MONOCYTES 13.80 (H) 10/01/2024 04:22 AM    EOSINOPHILS 0.00 10/01/2024 04:22 AM    BASOPHILS 0.10 10/01/2024 04:22 AM       HOSPITAL COURSE: The patient is a pleasant 79 year old male with severe symptomatic aortic stenosis, acute on chronic diastolic heart failure with acute on chronic respiratory failure, HLD with non-obstructive CAD, severe mitral stenosis, HTN, DM type II insulin dependent, and recurrent prostate cancer. Due to the patient's symptoms, the patient underwent successful TAVR described as above. Post-procedure, the patient remained hypoxemic upon extubation and therefore he was re-intubated intra-op before leaving the OR room. He required one night in the ICU with ventilator support, but recovered well and was extubated the next morning. They did require IV diuresis during their stay and will continue on oral diuretics post-operatively. Acute heart failure exacerbation as evidenced by: signs and symptoms of shortness of breath, GILMORE, orthopnea, paroxysmal nocturnal dyspnea, weakness/fatigue, bilateral lower extremity edema; Echocardiogram showing moderate mitral regurgitation, severe valvular stenosis, and moderate PVL of recent TAVR; corroborated by elevated BNP of 1544, pulmonary edema on chest x-ray, and requiring post-operative intubation for acute respiratory failure.They were able to ambulate without difficulty. He was insistent on going home today with his  home oxygen . His wife is a retired nurse and he feels comfortable in her care. He is now requiring walker for long distances and home health with PT has been ordered and will be arranged by case management on discharge. He has a walker at home. No further events were noted during their stay. They are  to be discharged to home with his wife on oxygen and home health PT.    DISCHARGE MEDICATIONS:      Medication List        START taking these medications        Instructions   aspirin 81 MG EC tablet   Take 1 Tablet by mouth every evening.  Dose: 81 mg     losartan 100 MG Tabs  Start taking on: October 3, 2024  Commonly known as: Cozaar   Take 1 Tablet by mouth every day.  Dose: 100 mg     nystatin powder  Commonly known as: Mycostatin   Apply 1 g topically 2 times a day.  Dose: 1 Application     spironolactone 25 MG Tabs  Start taking on: October 3, 2024  Commonly known as: Aldactone   Take 1 Tablet by mouth every day.  Dose: 25 mg            CHANGE how you take these medications        Instructions   furosemide 40 MG Tabs  What changed:   medication strength  how much to take  when to take this  Commonly known as: Lasix   Take 1 Tablet by mouth 2 times a day.  Dose: 40 mg     metoprolol SR 25 MG Tb24  What changed:   medication strength  how much to take  when to take this  Commonly known as: Toprol XL   Take 1 Tablet by mouth every evening.  Dose: 25 mg            CONTINUE taking these medications        Instructions   acetaminophen 500 MG Tabs  Commonly known as: Tylenol   Take 1,000 mg by mouth every 6 hours as needed for Moderate Pain.  Dose: 1,000 mg     fenofibrate 160 MG tablet  Commonly known as: Triglide   Take 160 mg by mouth every day.  Dose: 160 mg     Krill Oil 500 MG Caps   Take 500 mg by mouth every day.  Dose: 500 mg     metformin 1000 MG tablet  Commonly known as: Glucophage   Take 1,000 mg by mouth 2 times a day with meals.  Dose: 1,000 mg     Myrbetriq 50 MG Tb24  Generic drug: Mirabegron ER    Take 50 mg by mouth every evening.  Dose: 50 mg     pravastatin 40 MG tablet  Commonly known as: Pravachol   Take 40 mg by mouth every evening.  Dose: 40 mg     Probiotic 10 Ultra Strength Caps   Take 1 Capsule by mouth every day.  Dose: 1 Capsule     VITAMIN B COMPLEX PO   Take 1 Tablet by mouth every day.  Dose: 1 Tablet     vitamin D3 125 MCG (5000 UT) Caps   Take 1 Capsule by mouth every day.  Dose: 1 Capsule            STOP taking these medications      amLODIPine 10 MG Tabs  Commonly known as: Norvasc     ibuprofen 200 MG Tabs  Commonly known as: Motrin     losartan-hydrochlorothiazide 100-12.5 MG per tablet  Commonly known as: Hyzaar            DISCHARGE INSTRUCTIONS: They are given discharge instructions on potential post-operative complications and symptoms to watch out for. Their groin sites were checked and were clean, dry, and intact. Patient or family to notify us for any complications noted on the discharge instructions. They will follow up with myself, Kori Napoles DNP, RENÉ, on Tuesday in our cardiology office. They will get repeat labs before their follow up appointment. They will then follow up with a repeat echocardiogram in one month for post TAVR assessment.      Future Appointments   Date Time Provider Department Center   10/8/2024  1:45 PM GWENDOLYN Schwarz None   11/6/2024 12:15 PM V EXAM 10 ECHO [facility]   11/12/2024  1:20 PM EDITA Cadet None   8/27/2025 10:15 AM V EXAM 9 ECHO [facility]   9/4/2025  1:15 PM GWENDOLYN Schwarz None     Discharge time spent with patient was 24 minutes.

## 2024-10-02 NOTE — CARE PLAN
The patient is Stable - Low risk of patient condition declining or worsening    Shift Goals  Clinical Goals: Monitor VS, Labs, Wean O2, increase mobility  Patient Goals: get better, ambulate, sleep  Family Goals: na    Progress made toward(s) clinical / shift goals:    Problem: Respiratory  Goal: Patient will achieve/maintain optimum respiratory ventilation and gas exchange  10/1/2024 6609 by Mauricio Dudley R.N.  Outcome: Progressing     Problem: Post Op Day 1 CABG/Heart Valve Replacement  Goal: Optimal care of the post op CABG/heart valve replacement Post Op Day 1  Outcome: Progressing   SpO2 stable on 2L NC, Patient ambulated 600ft w/o assistance or need of increase in O2, Home O2 eval performed. Vascular access sites c/d/I w/ minimal ecchymosis. Distal pulses intact w/o complication. Neuro intact and stable. BLE edema greatly reduced. I/O's documented.     Patient is not progressing towards the following goals:

## 2024-10-02 NOTE — PROGRESS NOTES
Bedside report received from off going RN/tech: Mauricio assumed care of patient.     Fall Risk Score: LOW RISK  Fall risk interventions in place: Place yellow fall risk ID band on patient, Provide patient/family education based on risk assessment, Educate patient/family to call staff for assistance when getting out of bed, Place fall precaution signage outside patient door, Utilize bed/chair fall alarm, and Bed alarm connected correctly  Bed type: Regular (Jerome Score less than 17 interventions in place)  Patient on cardiac monitor: Yes  IVF/IV medications: Not Applicable   Oxygen: How many liters 2L  Bedside sitter: Not Applicable   Isolation: Not applicable

## 2024-10-02 NOTE — DISCHARGE INSTRUCTIONS
Transcatheter Aortic Valve Replacement (TAVR)    General Instructions:  Take Medication as directed.  You will likely need to take aspirin and another blood thinner (antiplatelet medication) for a period.  You'll need to take the aspirin for the rest of your life.  Be sure your doctor knows about all other medicines you take, including over-the-counter medicines and dietary supplements.    Incision Care Instructions:  Look and feel the site(s) of your TAVR TODAY so you can recognize changes that should be called to your doctor (see below).  It's normal for your incision to be bruised, itchy, or sore while it's healing.  Your incision may take a week or more to heal.  Bruising may occur.  Some of the discoloration may travel down the leg.  As it resolves, the color should change from blue to green to yellow-brown.  A small, round lump under the TAVR site may remain for up to 6 weeks.  Wash your incision site every day with warm water and soap.  Gently pat it dry.  Don't put powder, lotion, or ointment on the incision until it's healed.    Activity:  No driving for one week  If you must take a long car ride (not as a ), stop every hour and walk around the car.  No lifting or pulling objects over 5 pounds for 4-5 days.  Warm showers or baths are permitted after the bandage is removed.  Walk regularly.  One of the best ways to get stronger is to walk.  Walk a little more each day.  Take someone with you until you feel OK to walk alone.    When to call your health care provider:  You develop a fever.  Redness, swelling, bleeding, warmth, or fluid draining at the incision site.  Bruising appears to be new or does not look like it is getting better.  The small, round lump in the groin in the area of the TAVR site increases in size.    Seek immediate medical help if you have any of the following symptoms:  You experience any leg numbness, aching, or discomfort  Chest pain or trouble breathing (call 911)  Sudden  numbness or weakness in your face, arms, or legs (call 911)  Bowel movement that is bright red  Dizziness or fainting  Weight gain of more than 2 pounds in 24 hours or more than 5 pounds in 1 week  Swelling in your hands, feet, or ankles  Shortness of breath that doesn't get better when you rest  Pain that gets worse or doesn't go away  Fast or irregular pulse         HF Patient Discharge Instructions  Monitor your weight daily, and maintain a weight chart, to track your weight changes.   Activity as tolerated, unless your Doctor has ordered otherwise. Other activity order.  Follow a low fat, low cholesterol, low salt diet unless instructed otherwise by your Doctor. Read the labels on the back of food products and track your intake of fat, cholesterol and salt.   Fluid Restriction No. If a Fluid Restriction has been ordered by your Doctor, measure fluids with a measuring cup to ensure that you are not exceeding the restriction.   No smoking.  Oxygen Yes. If your Doctor has ordered that you wear Oxygen at home, it is important to wear it as ordered.  Did you receive an explanation from staff on the importance of taking each of your medications and why it is necessary to keep taking them unless your doctor says to stop? Yes  Were all of your questions answered about how to manage your heart failure and what to do if you have increased signs and symptoms after you go home? Yes  Do you feel like your heart failure care team involved you in the care treatment plan and allowed you to make decisions regarding your care while in the hospital and addressed any discharge needs you might have? Yes    See the educational handout provided at discharge for more information on monitoring your daily weight, activity and diet. This also explains more about Heart Failure, symptoms of a flare-up and some of the tests that you have undergone.     Warning Signs of a Flare-Up include:  Swelling in the ankles or lower legs.  Shortness of  breath, while at rest, or while doing normal activities.   Shortness of breath at night when in bed, or coughing in bed.   Requiring more pillows to sleep at night, or needing to sit up at night to sleep.  Feeling weak, dizzy or fatigued.     When to call your Doctor:  Call your Primary Care Physician or Cardiologist if:   You experience any pain radiating to your jaw or neck.  You have any difficulty breathing.  You experience weight gain of 3 lbs in a day or 5 lbs in a week.   You feel any palpitations or irregular heartbeats.  You become dizzy or lose consciousness.   If you have had an angiogram or had a pacemaker or AICD placed, and experience:  Bleeding, drainage or swelling at the surgical / puncture site.  Fever greater than 100.0 F  Shock from internal defibrillator.  Cool and / or numb extremities.     Please access the [facility] My HF Guide/Heart Failure Interactive Workbook:   [email or web address]

## 2024-10-03 ENCOUNTER — HOME CARE VISIT (OUTPATIENT)
Dept: HOME HEALTH SERVICES | Facility: HOME HEALTHCARE | Age: 79
End: 2024-10-03
Payer: MEDICARE

## 2024-10-03 ENCOUNTER — TELEPHONE (OUTPATIENT)
Dept: CARDIOLOGY | Facility: MEDICAL CENTER | Age: 79
End: 2024-10-03
Payer: MEDICARE

## 2024-10-03 VITALS
DIASTOLIC BLOOD PRESSURE: 60 MMHG | OXYGEN SATURATION: 97 % | WEIGHT: 174.38 LBS | HEART RATE: 84 BPM | TEMPERATURE: 98.4 F | RESPIRATION RATE: 18 BRPM | SYSTOLIC BLOOD PRESSURE: 110 MMHG | BODY MASS INDEX: 25.02 KG/M2

## 2024-10-03 PROBLEM — R09.02 HYPOXEMIA: Status: RESOLVED | Noted: 2024-09-30 | Resolved: 2024-10-03

## 2024-10-03 PROBLEM — J18.9 PNEUMONIA: Status: RESOLVED | Noted: 2024-08-20 | Resolved: 2024-10-03

## 2024-10-03 PROCEDURE — 665998 HH PPS REVENUE CREDIT

## 2024-10-03 PROCEDURE — G0299 HHS/HOSPICE OF RN EA 15 MIN: HCPCS

## 2024-10-03 PROCEDURE — 665999 HH PPS REVENUE DEBIT

## 2024-10-03 PROCEDURE — 665005 NO-PAY RAP - HOME HEALTH

## 2024-10-03 PROCEDURE — 665001 SOC-HOME HEALTH

## 2024-10-03 SDOH — ECONOMIC STABILITY: HOUSING INSECURITY: EVIDENCE OF SMOKING MATERIAL: 0

## 2024-10-03 ASSESSMENT — ENCOUNTER SYMPTOMS
PAIN SEVERITY GOAL: 0/10
SHORTNESS OF BREATH: 1
FATIGUES EASILY: 1
BOWEL PATTERN NORMAL: 1
STOOL FREQUENCY: DAILY
LOWER EXTREMITY EDEMA: 1
COUGH: 1
DYSPNEA ACTIVITY LEVEL: AFTER AMBULATING MORE THAN 20 FT
LAST BOWEL MOVEMENT: 67116
LOWEST PAIN SEVERITY IN PAST 24 HOURS: 0/10
HIGHEST PAIN SEVERITY IN PAST 24 HOURS: 4/10
PAIN: 1
DYSPNEA ON EXERTION: 1
SUBJECTIVE PAIN PROGRESSION: WAXING AND WANING
COUGH CHARACTERISTICS: STRONG
COUGH CHARACTERISTICS: PRODUCTIVE
PAIN LOCATION: LEFT HIP
FATIGUE: 1
PAIN LOCATION - PAIN SEVERITY: 4/10

## 2024-10-03 ASSESSMENT — ACTIVITIES OF DAILY LIVING (ADL): OASIS_M1830: 03

## 2024-10-03 ASSESSMENT — FIBROSIS 4 INDEX: FIB4 SCORE: 4.33

## 2024-10-04 ENCOUNTER — HOME CARE VISIT (OUTPATIENT)
Dept: HOME HEALTH SERVICES | Facility: HOME HEALTHCARE | Age: 79
End: 2024-10-04
Payer: MEDICARE

## 2024-10-04 ENCOUNTER — DOCUMENTATION (OUTPATIENT)
Dept: MEDICAL GROUP | Facility: PHYSICIAN GROUP | Age: 79
End: 2024-10-04
Payer: MEDICARE

## 2024-10-04 VITALS
DIASTOLIC BLOOD PRESSURE: 58 MMHG | SYSTOLIC BLOOD PRESSURE: 110 MMHG | RESPIRATION RATE: 17 BRPM | TEMPERATURE: 97.6 F | BODY MASS INDEX: 24.61 KG/M2 | WEIGHT: 171.5 LBS | OXYGEN SATURATION: 97 % | HEART RATE: 86 BPM

## 2024-10-04 LAB
BACTERIA FLD AEROBE CULT: NORMAL
GRAM STN SPEC: NORMAL
SIGNIFICANT IND 70042: NORMAL
SITE SITE: NORMAL
SOURCE SOURCE: NORMAL

## 2024-10-04 PROCEDURE — G0151 HHCP-SERV OF PT,EA 15 MIN: HCPCS

## 2024-10-04 PROCEDURE — 665999 HH PPS REVENUE DEBIT

## 2024-10-04 PROCEDURE — 665998 HH PPS REVENUE CREDIT

## 2024-10-04 SDOH — ECONOMIC STABILITY: HOUSING INSECURITY
HOME SAFETY: PATIENT AND SPOUSE EDUCATED THAT PETS HAVE TO BE PUT UP DURING VISITS. PATIENT AND SPOUSE STATE OUR PETS ARE HARMLESS". EDUCATED THAT PETS NEED TO BE PUT UP PER HOME HEALTH POLICY. "

## 2024-10-04 ASSESSMENT — FIBROSIS 4 INDEX: FIB4 SCORE: 4.33

## 2024-10-04 ASSESSMENT — ACTIVITIES OF DAILY LIVING (ADL): AMBULATION ASSISTANCE ON FLAT SURFACES: 1

## 2024-10-05 PROCEDURE — 665998 HH PPS REVENUE CREDIT

## 2024-10-05 PROCEDURE — 665999 HH PPS REVENUE DEBIT

## 2024-10-06 PROCEDURE — 665998 HH PPS REVENUE CREDIT

## 2024-10-06 PROCEDURE — 665999 HH PPS REVENUE DEBIT

## 2024-10-07 ENCOUNTER — HOME CARE VISIT (OUTPATIENT)
Dept: HOME HEALTH SERVICES | Facility: HOME HEALTHCARE | Age: 79
End: 2024-10-07
Payer: MEDICARE

## 2024-10-07 VITALS
HEART RATE: 80 BPM | BODY MASS INDEX: 24.15 KG/M2 | SYSTOLIC BLOOD PRESSURE: 124 MMHG | RESPIRATION RATE: 16 BRPM | WEIGHT: 168.3 LBS | DIASTOLIC BLOOD PRESSURE: 66 MMHG | TEMPERATURE: 97.1 F | OXYGEN SATURATION: 97 %

## 2024-10-07 PROCEDURE — 665999 HH PPS REVENUE DEBIT

## 2024-10-07 PROCEDURE — G0299 HHS/HOSPICE OF RN EA 15 MIN: HCPCS

## 2024-10-07 PROCEDURE — 665998 HH PPS REVENUE CREDIT

## 2024-10-07 SDOH — ECONOMIC STABILITY: HOUSING INSECURITY: EVIDENCE OF SMOKING MATERIAL: 0

## 2024-10-07 ASSESSMENT — ENCOUNTER SYMPTOMS
PAIN LOCATION - PAIN FREQUENCY: INTERMITTENT
LAST BOWEL MOVEMENT: 67120
PAIN LOCATION - PAIN SEVERITY: 3/10
PAIN: 1
PAIN LOCATION - PAIN QUALITY: MUSCLE TIGHTNESS
NAUSEA: DENIES
PAIN LOCATION - EXACERBATING FACTORS: MOVEMENT
VOMITING: DENIES
PAIN SEVERITY GOAL: 0/10
LOWEST PAIN SEVERITY IN PAST 24 HOURS: 1/10
FORGETFULNESS: 1
PAIN LOCATION: LOWER BACK
SUBJECTIVE PAIN PROGRESSION: UNCHANGED
HIGHEST PAIN SEVERITY IN PAST 24 HOURS: 4/10

## 2024-10-07 ASSESSMENT — FIBROSIS 4 INDEX: FIB4 SCORE: 4.33

## 2024-10-08 ENCOUNTER — OFFICE VISIT (OUTPATIENT)
Dept: CARDIOLOGY | Facility: MEDICAL CENTER | Age: 79
End: 2024-10-08
Attending: NURSE PRACTITIONER
Payer: MEDICARE

## 2024-10-08 VITALS
SYSTOLIC BLOOD PRESSURE: 100 MMHG | RESPIRATION RATE: 18 BRPM | WEIGHT: 169 LBS | HEIGHT: 70 IN | OXYGEN SATURATION: 97 % | BODY MASS INDEX: 24.2 KG/M2 | DIASTOLIC BLOOD PRESSURE: 52 MMHG | HEART RATE: 83 BPM

## 2024-10-08 DIAGNOSIS — I25.10 CORONARY ARTERY DISEASE INVOLVING NATIVE CORONARY ARTERY OF NATIVE HEART WITHOUT ANGINA PECTORIS: ICD-10-CM

## 2024-10-08 DIAGNOSIS — I10 PRIMARY HYPERTENSION: ICD-10-CM

## 2024-10-08 DIAGNOSIS — R94.30 ELEVATED LEFT VENTRICULAR END-DIASTOLIC PRESSURE (LVEDP): ICD-10-CM

## 2024-10-08 DIAGNOSIS — Z95.2 S/P TAVR (TRANSCATHETER AORTIC VALVE REPLACEMENT): ICD-10-CM

## 2024-10-08 DIAGNOSIS — I05.0 SEVERE MITRAL VALVE STENOSIS: Chronic | ICD-10-CM

## 2024-10-08 DIAGNOSIS — J96.21 ACUTE ON CHRONIC RESPIRATORY FAILURE WITH HYPOXIA (HCC): ICD-10-CM

## 2024-10-08 DIAGNOSIS — I38 ACUTE ON CHRONIC DIASTOLIC HEART FAILURE DUE TO VALVULAR DISEASE (HCC): ICD-10-CM

## 2024-10-08 DIAGNOSIS — E11.65 TYPE 2 DIABETES MELLITUS WITH HYPERGLYCEMIA, WITHOUT LONG-TERM CURRENT USE OF INSULIN (HCC): ICD-10-CM

## 2024-10-08 DIAGNOSIS — I50.33 ACUTE ON CHRONIC DIASTOLIC HEART FAILURE DUE TO VALVULAR DISEASE (HCC): ICD-10-CM

## 2024-10-08 PROCEDURE — 99214 OFFICE O/P EST MOD 30 MIN: CPT | Performed by: NURSE PRACTITIONER

## 2024-10-08 PROCEDURE — 665999 HH PPS REVENUE DEBIT

## 2024-10-08 PROCEDURE — 3074F SYST BP LT 130 MM HG: CPT | Performed by: NURSE PRACTITIONER

## 2024-10-08 PROCEDURE — 3078F DIAST BP <80 MM HG: CPT | Performed by: NURSE PRACTITIONER

## 2024-10-08 PROCEDURE — 93005 ELECTROCARDIOGRAM TRACING: CPT | Performed by: NURSE PRACTITIONER

## 2024-10-08 PROCEDURE — 665998 HH PPS REVENUE CREDIT

## 2024-10-08 RX ORDER — FUROSEMIDE 40 MG/1
40 TABLET ORAL DAILY
Qty: 100 TABLET | Refills: 3 | Status: SHIPPED | OUTPATIENT
Start: 2024-10-08

## 2024-10-08 RX ORDER — LOSARTAN POTASSIUM 100 MG/1
100 TABLET ORAL DAILY
Qty: 100 TABLET | Refills: 3 | Status: SHIPPED | OUTPATIENT
Start: 2024-10-08

## 2024-10-08 RX ORDER — SPIRONOLACTONE 25 MG/1
25 TABLET ORAL DAILY
Qty: 100 TABLET | Refills: 3 | Status: SHIPPED | OUTPATIENT
Start: 2024-10-08

## 2024-10-08 RX ORDER — METOPROLOL SUCCINATE 25 MG/1
25 TABLET, EXTENDED RELEASE ORAL EVERY EVENING
Qty: 100 TABLET | Refills: 3 | Status: SHIPPED | OUTPATIENT
Start: 2024-10-08

## 2024-10-08 ASSESSMENT — ENCOUNTER SYMPTOMS
PND: 0
ORTHOPNEA: 0
MYALGIAS: 0
SHORTNESS OF BREATH: 1
ABDOMINAL PAIN: 0
PALPITATIONS: 0
COUGH: 0
CLAUDICATION: 0
FEVER: 0
DIZZINESS: 0

## 2024-10-08 ASSESSMENT — FIBROSIS 4 INDEX: FIB4 SCORE: 4.33

## 2024-10-09 ENCOUNTER — DOCUMENTATION (OUTPATIENT)
Dept: CARDIOLOGY | Facility: MEDICAL CENTER | Age: 79
End: 2024-10-09
Payer: MEDICARE

## 2024-10-09 ENCOUNTER — HOSPITAL ENCOUNTER (OUTPATIENT)
Facility: MEDICAL CENTER | Age: 79
End: 2024-10-09
Attending: NURSE PRACTITIONER
Payer: MEDICARE

## 2024-10-09 ENCOUNTER — HOME CARE VISIT (OUTPATIENT)
Dept: HOME HEALTH SERVICES | Facility: HOME HEALTHCARE | Age: 79
End: 2024-10-09
Payer: MEDICARE

## 2024-10-09 ENCOUNTER — TELEPHONE (OUTPATIENT)
Dept: CARDIOLOGY | Facility: MEDICAL CENTER | Age: 79
End: 2024-10-09
Payer: MEDICARE

## 2024-10-09 VITALS
DIASTOLIC BLOOD PRESSURE: 68 MMHG | HEART RATE: 73 BPM | SYSTOLIC BLOOD PRESSURE: 118 MMHG | TEMPERATURE: 97 F | OXYGEN SATURATION: 96 % | RESPIRATION RATE: 17 BRPM

## 2024-10-09 LAB
BASOPHILS # BLD AUTO: 0.5 % (ref 0–1.8)
BASOPHILS # BLD: 0.06 K/UL (ref 0–0.12)
EOSINOPHIL # BLD AUTO: 0.1 K/UL (ref 0–0.51)
EOSINOPHIL NFR BLD: 0.9 % (ref 0–6.9)
ERYTHROCYTE [DISTWIDTH] IN BLOOD BY AUTOMATED COUNT: 48.9 FL (ref 35.9–50)
HCT VFR BLD AUTO: 36.6 % (ref 42–52)
HGB BLD-MCNC: 11.9 G/DL (ref 14–18)
IMM GRANULOCYTES # BLD AUTO: 0.12 K/UL (ref 0–0.11)
IMM GRANULOCYTES NFR BLD AUTO: 1.1 % (ref 0–0.9)
LYMPHOCYTES # BLD AUTO: 1.43 K/UL (ref 1–4.8)
LYMPHOCYTES NFR BLD: 12.9 % (ref 22–41)
MCH RBC QN AUTO: 30 PG (ref 27–33)
MCHC RBC AUTO-ENTMCNC: 32.5 G/DL (ref 32.3–36.5)
MCV RBC AUTO: 92.2 FL (ref 81.4–97.8)
MONOCYTES # BLD AUTO: 1.38 K/UL (ref 0–0.85)
MONOCYTES NFR BLD AUTO: 12.4 % (ref 0–13.4)
NEUTROPHILS # BLD AUTO: 8 K/UL (ref 1.82–7.42)
NEUTROPHILS NFR BLD: 72.2 % (ref 44–72)
NRBC # BLD AUTO: 0 K/UL
NRBC BLD-RTO: 0 /100 WBC (ref 0–0.2)
PLATELET # BLD AUTO: 235 K/UL (ref 164–446)
PMV BLD AUTO: 11.5 FL (ref 9–12.9)
RBC # BLD AUTO: 3.97 M/UL (ref 4.7–6.1)
WBC # BLD AUTO: 11.1 K/UL (ref 4.8–10.8)

## 2024-10-09 PROCEDURE — 85025 COMPLETE CBC W/AUTO DIFF WBC: CPT

## 2024-10-09 PROCEDURE — 665998 HH PPS REVENUE CREDIT

## 2024-10-09 PROCEDURE — 665999 HH PPS REVENUE DEBIT

## 2024-10-09 PROCEDURE — G0299 HHS/HOSPICE OF RN EA 15 MIN: HCPCS

## 2024-10-09 SDOH — ECONOMIC STABILITY: HOUSING INSECURITY: EVIDENCE OF SMOKING MATERIAL: 0

## 2024-10-09 ASSESSMENT — ENCOUNTER SYMPTOMS
LAST BOWEL MOVEMENT: 67122
HIGHEST PAIN SEVERITY IN PAST 24 HOURS: 0/10
NAUSEA: DENIES
PAIN LOCATION: NO PAIN CURRENTLY
VOMITING: DENIES
SUBJECTIVE PAIN PROGRESSION: GRADUALLY IMPROVING
MUSCLE WEAKNESS: 1
PAIN SEVERITY GOAL: 0/10
LIMITED RANGE OF MOTION: 1
LOWEST PAIN SEVERITY IN PAST 24 HOURS: 0/10
PAIN: 1

## 2024-10-10 ENCOUNTER — HOME CARE VISIT (OUTPATIENT)
Dept: HOME HEALTH SERVICES | Facility: HOME HEALTHCARE | Age: 79
End: 2024-10-10
Payer: MEDICARE

## 2024-10-10 PROCEDURE — 665998 HH PPS REVENUE CREDIT

## 2024-10-10 PROCEDURE — 665999 HH PPS REVENUE DEBIT

## 2024-10-11 ENCOUNTER — HOME CARE VISIT (OUTPATIENT)
Dept: HOME HEALTH SERVICES | Facility: HOME HEALTHCARE | Age: 79
End: 2024-10-11
Payer: MEDICARE

## 2024-10-11 ENCOUNTER — HOSPITAL ENCOUNTER (OUTPATIENT)
Facility: MEDICAL CENTER | Age: 79
End: 2024-10-11
Attending: NURSE PRACTITIONER
Payer: MEDICARE

## 2024-10-11 VITALS
DIASTOLIC BLOOD PRESSURE: 74 MMHG | WEIGHT: 169.1 LBS | TEMPERATURE: 97.1 F | RESPIRATION RATE: 16 BRPM | SYSTOLIC BLOOD PRESSURE: 128 MMHG | OXYGEN SATURATION: 95 % | BODY MASS INDEX: 24.26 KG/M2 | HEART RATE: 77 BPM

## 2024-10-11 PROCEDURE — 80053 COMPREHEN METABOLIC PANEL: CPT

## 2024-10-11 PROCEDURE — 83880 ASSAY OF NATRIURETIC PEPTIDE: CPT

## 2024-10-11 PROCEDURE — 665998 HH PPS REVENUE CREDIT

## 2024-10-11 PROCEDURE — G0299 HHS/HOSPICE OF RN EA 15 MIN: HCPCS

## 2024-10-11 PROCEDURE — 665999 HH PPS REVENUE DEBIT

## 2024-10-11 SDOH — ECONOMIC STABILITY: HOUSING INSECURITY: EVIDENCE OF SMOKING MATERIAL: 0

## 2024-10-11 ASSESSMENT — ENCOUNTER SYMPTOMS
VOMITING: DENIES
FORGETFULNESS: 1
NAUSEA: DENIES
DENIES PAIN: 1
MUSCLE WEAKNESS: 1
LAST BOWEL MOVEMENT: 67123

## 2024-10-11 ASSESSMENT — FIBROSIS 4 INDEX: FIB4 SCORE: 3.32

## 2024-10-12 LAB
ALBUMIN SERPL BCP-MCNC: 3.7 G/DL (ref 3.2–4.9)
ALBUMIN/GLOB SERPL: 1.1 G/DL
ALP SERPL-CCNC: 56 U/L (ref 30–99)
ALT SERPL-CCNC: 26 U/L (ref 2–50)
ANION GAP SERPL CALC-SCNC: 14 MMOL/L (ref 7–16)
AST SERPL-CCNC: 29 U/L (ref 12–45)
BILIRUB SERPL-MCNC: 0.3 MG/DL (ref 0.1–1.5)
BUN SERPL-MCNC: 40 MG/DL (ref 8–22)
CALCIUM ALBUM COR SERPL-MCNC: 9.9 MG/DL (ref 8.5–10.5)
CALCIUM SERPL-MCNC: 9.7 MG/DL (ref 8.5–10.5)
CHLORIDE SERPL-SCNC: 102 MMOL/L (ref 96–112)
CO2 SERPL-SCNC: 22 MMOL/L (ref 20–33)
CREAT SERPL-MCNC: 0.93 MG/DL (ref 0.5–1.4)
EKG IMPRESSION: NORMAL
GFR SERPLBLD CREATININE-BSD FMLA CKD-EPI: 83 ML/MIN/1.73 M 2
GLOBULIN SER CALC-MCNC: 3.4 G/DL (ref 1.9–3.5)
GLUCOSE SERPL-MCNC: 90 MG/DL (ref 65–99)
NT-PROBNP SERPL IA-MCNC: 820 PG/ML (ref 0–125)
POTASSIUM SERPL-SCNC: 4.1 MMOL/L (ref 3.6–5.5)
PROT SERPL-MCNC: 7.1 G/DL (ref 6–8.2)
SODIUM SERPL-SCNC: 138 MMOL/L (ref 135–145)

## 2024-10-12 PROCEDURE — 665999 HH PPS REVENUE DEBIT

## 2024-10-12 PROCEDURE — 93010 ELECTROCARDIOGRAM REPORT: CPT | Performed by: INTERNAL MEDICINE

## 2024-10-12 PROCEDURE — 665998 HH PPS REVENUE CREDIT

## 2024-10-13 PROCEDURE — 665998 HH PPS REVENUE CREDIT

## 2024-10-13 PROCEDURE — 665999 HH PPS REVENUE DEBIT

## 2024-10-14 ENCOUNTER — HOME CARE VISIT (OUTPATIENT)
Dept: HOME HEALTH SERVICES | Facility: HOME HEALTHCARE | Age: 79
End: 2024-10-14
Payer: MEDICARE

## 2024-10-14 VITALS
HEART RATE: 77 BPM | SYSTOLIC BLOOD PRESSURE: 130 MMHG | TEMPERATURE: 97.1 F | DIASTOLIC BLOOD PRESSURE: 64 MMHG | RESPIRATION RATE: 16 BRPM | OXYGEN SATURATION: 94 %

## 2024-10-14 PROCEDURE — G0299 HHS/HOSPICE OF RN EA 15 MIN: HCPCS

## 2024-10-14 PROCEDURE — 665999 HH PPS REVENUE DEBIT

## 2024-10-14 PROCEDURE — 665998 HH PPS REVENUE CREDIT

## 2024-10-14 SDOH — ECONOMIC STABILITY: HOUSING INSECURITY: EVIDENCE OF SMOKING MATERIAL: 0

## 2024-10-14 ASSESSMENT — ACTIVITIES OF DAILY LIVING (ADL)
OASIS_M1830: 01
HOME_HEALTH_OASIS: 01

## 2024-10-14 ASSESSMENT — ENCOUNTER SYMPTOMS
NAUSEA: DENIES
VOMITING: DENIES
FORGETFULNESS: 1
FATIGUES EASILY: 1
LAST BOWEL MOVEMENT: 67127
SHORTNESS OF BREATH: 1
DYSPNEA ACTIVITY LEVEL: AFTER AMBULATING MORE THAN 20 FT
DENIES PAIN: 1

## 2024-10-15 PROCEDURE — 665999 HH PPS REVENUE DEBIT

## 2024-10-15 PROCEDURE — 665998 HH PPS REVENUE CREDIT

## 2024-10-16 ENCOUNTER — HOME CARE VISIT (OUTPATIENT)
Dept: HOME HEALTH SERVICES | Facility: HOME HEALTHCARE | Age: 79
End: 2024-10-16
Payer: MEDICARE

## 2024-10-16 PROCEDURE — 665999 HH PPS REVENUE DEBIT

## 2024-10-16 PROCEDURE — 665998 HH PPS REVENUE CREDIT

## 2024-10-17 PROCEDURE — 665998 HH PPS REVENUE CREDIT

## 2024-10-17 PROCEDURE — 665999 HH PPS REVENUE DEBIT

## 2024-10-18 PROCEDURE — 665998 HH PPS REVENUE CREDIT

## 2024-10-18 PROCEDURE — 665999 HH PPS REVENUE DEBIT

## 2024-10-19 PROCEDURE — 665998 HH PPS REVENUE CREDIT

## 2024-10-19 PROCEDURE — 665999 HH PPS REVENUE DEBIT

## 2024-10-20 PROCEDURE — 665998 HH PPS REVENUE CREDIT

## 2024-10-20 PROCEDURE — 665999 HH PPS REVENUE DEBIT

## 2024-10-21 PROCEDURE — 665999 HH PPS REVENUE DEBIT

## 2024-10-21 PROCEDURE — 665998 HH PPS REVENUE CREDIT

## 2024-10-22 PROCEDURE — 665998 HH PPS REVENUE CREDIT

## 2024-10-22 PROCEDURE — 665999 HH PPS REVENUE DEBIT

## 2024-10-23 PROCEDURE — 665999 HH PPS REVENUE DEBIT

## 2024-10-23 PROCEDURE — 665998 HH PPS REVENUE CREDIT

## 2024-10-24 PROCEDURE — 665998 HH PPS REVENUE CREDIT

## 2024-10-24 PROCEDURE — 665999 HH PPS REVENUE DEBIT

## 2024-10-25 PROCEDURE — 665999 HH PPS REVENUE DEBIT

## 2024-10-25 PROCEDURE — 665998 HH PPS REVENUE CREDIT

## 2024-10-26 PROCEDURE — 665999 HH PPS REVENUE DEBIT

## 2024-10-26 PROCEDURE — 665998 HH PPS REVENUE CREDIT

## 2024-10-27 PROCEDURE — 665998 HH PPS REVENUE CREDIT

## 2024-10-27 PROCEDURE — 665999 HH PPS REVENUE DEBIT

## 2024-10-29 LAB
FUNGUS SPEC CULT: NORMAL
FUNGUS SPEC FUNGUS STN: NORMAL
SIGNIFICANT IND 70042: NORMAL
SITE SITE: NORMAL
SOURCE SOURCE: NORMAL

## 2024-11-06 ENCOUNTER — HOSPITAL ENCOUNTER (OUTPATIENT)
Dept: CARDIOLOGY | Facility: MEDICAL CENTER | Age: 79
End: 2024-11-06
Attending: INTERNAL MEDICINE
Payer: MEDICARE

## 2024-11-06 DIAGNOSIS — I35.0 SEVERE AORTIC STENOSIS: ICD-10-CM

## 2024-11-06 PROCEDURE — 93306 TTE W/DOPPLER COMPLETE: CPT

## 2024-11-07 LAB
LV EJECT FRACT  99904: 60
LV EJECT FRACT MOD 2C 99903: 78.81
LV EJECT FRACT MOD 4C 99902: 57.61
LV EJECT FRACT MOD BP 99901: 61.41

## 2024-11-07 PROCEDURE — 93306 TTE W/DOPPLER COMPLETE: CPT | Mod: 26 | Performed by: INTERNAL MEDICINE

## 2024-11-12 ENCOUNTER — DOCUMENTATION (OUTPATIENT)
Dept: CARDIOLOGY | Facility: MEDICAL CENTER | Age: 79
End: 2024-11-12
Payer: MEDICARE

## 2024-11-12 ENCOUNTER — OFFICE VISIT (OUTPATIENT)
Dept: CARDIOLOGY | Facility: MEDICAL CENTER | Age: 79
End: 2024-11-12
Attending: INTERNAL MEDICINE
Payer: MEDICARE

## 2024-11-12 VITALS
HEIGHT: 70 IN | WEIGHT: 181 LBS | OXYGEN SATURATION: 96 % | DIASTOLIC BLOOD PRESSURE: 62 MMHG | SYSTOLIC BLOOD PRESSURE: 118 MMHG | BODY MASS INDEX: 25.91 KG/M2 | RESPIRATION RATE: 16 BRPM | HEART RATE: 90 BPM

## 2024-11-12 DIAGNOSIS — T82.03XD PARAVALVULAR LEAK OF PROSTHETIC HEART VALVE, SUBSEQUENT ENCOUNTER: ICD-10-CM

## 2024-11-12 DIAGNOSIS — Z95.2 S/P TAVR (TRANSCATHETER AORTIC VALVE REPLACEMENT): ICD-10-CM

## 2024-11-12 DIAGNOSIS — I38 ACUTE ON CHRONIC DIASTOLIC HEART FAILURE DUE TO VALVULAR DISEASE (HCC): ICD-10-CM

## 2024-11-12 DIAGNOSIS — I10 PRIMARY HYPERTENSION: ICD-10-CM

## 2024-11-12 DIAGNOSIS — I05.0 MODERATE MITRAL STENOSIS: ICD-10-CM

## 2024-11-12 DIAGNOSIS — I50.33 ACUTE ON CHRONIC DIASTOLIC HEART FAILURE DUE TO VALVULAR DISEASE (HCC): ICD-10-CM

## 2024-11-12 PROBLEM — T82.03XA PARAVALVULAR LEAK (PROSTHETIC VALVE): Status: ACTIVE | Noted: 2024-11-12

## 2024-11-12 PROCEDURE — 3078F DIAST BP <80 MM HG: CPT | Performed by: INTERNAL MEDICINE

## 2024-11-12 PROCEDURE — 99214 OFFICE O/P EST MOD 30 MIN: CPT | Performed by: INTERNAL MEDICINE

## 2024-11-12 PROCEDURE — 99213 OFFICE O/P EST LOW 20 MIN: CPT | Performed by: INTERNAL MEDICINE

## 2024-11-12 PROCEDURE — 3074F SYST BP LT 130 MM HG: CPT | Performed by: INTERNAL MEDICINE

## 2024-11-12 PROCEDURE — G2211 COMPLEX E/M VISIT ADD ON: HCPCS | Performed by: INTERNAL MEDICINE

## 2024-11-12 RX ORDER — METOPROLOL SUCCINATE 50 MG/1
50 TABLET, EXTENDED RELEASE ORAL EVERY EVENING
Qty: 100 TABLET | Refills: 3 | Status: SHIPPED | OUTPATIENT
Start: 2024-11-12

## 2024-11-12 RX ORDER — FUROSEMIDE 40 MG/1
40 TABLET ORAL
Qty: 100 TABLET | Refills: 3 | Status: SHIPPED | OUTPATIENT
Start: 2024-11-12

## 2024-11-12 ASSESSMENT — FIBROSIS 4 INDEX: FIB4 SCORE: 1.94

## 2024-11-12 NOTE — PROGRESS NOTES
"CARDIOLOGY OUTPATIENT FOLLOWUP    PCP: Francois Penaloza M.D.    1. S/P TAVR (transcatheter aortic valve replacement)    2. Paravalvular leak of prosthetic heart valve, subsequent encounter    3. 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 Moderate mitral stenosis    5. Primary hypertension        Juan Manuel Martinez has ongoing class II symptoms despite successful TAVR procedure.  This may be related to residual mitral stenosis or potentially paravalvular leak-though the latter appears only mild on the recent echocardiogram.  I did discuss further assessment with a ASAD though we will hold on this for the time being.  He will increase metoprolol in an effort to bring the heart rate under 70 and we will move Lasix to as needed.  I will update a laboratory panel including anemia studies    Follow up: 3 months    History: Juan Manuel Martinez is a 79 y.o. male with history of degenerative aortic and mitral stenosis-status post TAVR September 30 with initial moderate PVL presenting for assessment.  Most recent echocardiogram has shown PVL only mild.  The mean mitral gradient is 8 at a rate of 78 bpm.  There is also moderate MR.  Normal LVEF.    He is accompanied by his wife Ely today who is a retired RN.  He expresses dissatisfaction with urinary frequency utilizing Lasix.  Has not had any problems with edema.    He feels he has normal energy though Ely states that it is still depressed.    He will need prostate irradiation soon.  Has prior radical prostatectomy.      Physical Exam:  /62 (BP Location: Left arm, Patient Position: Sitting, BP Cuff Size: Adult)   Pulse 90   Resp 16   Ht 1.778 m (5' 10\")   Wt 82.1 kg (181 lb)   SpO2 96%   BMI 25.97 kg/m²   GEN: NAD  CARDIAC: Regular. Normal S1, S2, Systolic murmur.   VASCULATURE: Normal carotid upstroke  RESP: Clear to auscultation bilaterally  ABD: Soft, non-tender, non-distended  EXT: Trace lower extremity edema  NEURO: No " focal deficit       () Today's E/M visit is associated with medical care services that serve as the continuing focal point for all needed health care services and/or with medical care services that  are part of ongoing care related to a patient's single, serious condition, or a complex condition: This includes  furnishing services to patients on an ongoing basis that result in care that is personalized  to the patient. The services result in a comprehensive, longitudinal, and continuous  relationship with the patient and involve delivery of team-based care that is accessible, coordinated with other practitioners and providers, and integrated with the broader health  care landscape.     The ASCVD Risk score (Zander LANCE, et al., 2019) failed to calculate.    Studies  Lab Results   Component Value Date/Time    CHOLSTRLTOT 145 10/01/2024 04:22 AM    LDL 75 10/01/2024 04:22 AM    HDL 41 10/01/2024 04:22 AM    TRIGLYCERIDE 143 10/01/2024 04:22 AM       Lab Results   Component Value Date/Time    SODIUM 138 10/11/2024 11:35 AM    POTASSIUM 4.1 10/11/2024 11:35 AM    CHLORIDE 102 10/11/2024 11:35 AM    CO2 22 10/11/2024 11:35 AM    GLUCOSE 90 10/11/2024 11:35 AM    BUN 40 (H) 10/11/2024 11:35 AM    CREATININE 0.93 10/11/2024 11:35 AM      Lab Results   Component Value Date/Time    PROTHROMBTM 14.9 (H) 09/30/2024 11:25 AM    INR 1.17 (H) 09/30/2024 11:25 AM      Lab Results   Component Value Date/Time    WBC 11.1 (H) 10/09/2024 09:50 AM    RBC 3.97 (L) 10/09/2024 09:50 AM    HEMOGLOBIN 11.9 (L) 10/09/2024 09:50 AM    HEMATOCRIT 36.6 (L) 10/09/2024 09:50 AM    MCV 92.2 10/09/2024 09:50 AM    MCH 30.0 10/09/2024 09:50 AM    MCHC 32.5 10/09/2024 09:50 AM    MPV 11.5 10/09/2024 09:50 AM    NEUTSPOLYS 72.20 (H) 10/09/2024 09:50 AM    LYMPHOCYTES 12.90 (L) 10/09/2024 09:50 AM    MONOCYTES 12.40 10/09/2024 09:50 AM    EOSINOPHILS 0.90 10/09/2024 09:50 AM    BASOPHILS 0.50 10/09/2024 09:50 AM        Past Medical History:    Diagnosis Date    Breath shortness     Cancer ([facility]) 2012    prostate    Dental disorder     partial denture    Diabetes ([facility])     Heart murmur     High cholesterol     Hypertension     Pneumonia 08/30/2024    Prostate cancer ([facility])     Prostate cancer ([facility])     Severe aortic stenosis 08/20/2024    Severe mitral valve stenosis 08/21/2024     No Known Allergies  Outpatient Encounter Medications as of 11/12/2024   Medication Sig Dispense Refill    furosemide (LASIX) 40 MG Tab Take 1 Tablet by mouth every day. Indications: Cardiac Failure 100 Tablet 3    spironolactone (ALDACTONE) 25 MG Tab Take 1 Tablet by mouth every day. Indications: High Blood Pressure 100 Tablet 3    metoprolol SR (TOPROL XL) 25 MG TABLET SR 24 HR Take 1 Tablet by mouth every evening. Indications: Cardiac Failure 100 Tablet 3    losartan (COZAAR) 100 MG Tab Take 1 Tablet by mouth every day. Indications: High Blood Pressure 100 Tablet 3    Home Care Oxygen Inhale 2 L/min continuous. Oxygen dose: 2 L/min at night time  Respiratory route via: Nasal Cannula   Oxygen supplier: [facility]      Indications: hypoxia      aspirin 81 MG EC tablet Take 1 Tablet by mouth every evening. 30 Tablet 0    nystatin (MYCOSTATIN) powder Apply 1 g topically 2 times a day. 30 g 0    acetaminophen (TYLENOL) 500 MG Tab Take 1,000 mg by mouth every 6 hours as needed for Moderate Pain. Indications: Pain      Probiotic Product (PROBIOTIC 10 ULTRA STRENGTH) Cap Take 1 Capsule by mouth every 48 hours. Indications: supplement      Krill Oil 500 MG Cap Take 500 mg by mouth every day. Indications: supplement      Cholecalciferol (VITAMIN D3) 125 MCG (5000 UT) Cap Take 1 Capsule by mouth every day. Indications: supplement      B Complex Vitamins (VITAMIN B COMPLEX PO) Take 1 Tablet by mouth every day. Indications: supplements      fenofibrate (TRIGLIDE) 160 MG tablet Take 160 mg by mouth every day. Indications: High Amount of Triglycerides in the Blood      metformin (GLUCOPHAGE) 1000  MG tablet Take 1,000 mg by mouth 2 times a day with meals. Indications: Type 2 Diabetes      MYRBETRIQ 50 MG TABLET SR 24 HR Take 50 mg by mouth every evening. Indications: Overactive Bladder      pravastatin (PRAVACHOL) 40 MG tablet Take 40 mg by mouth every evening. Indications: High Amount of Fats in the Blood       No facility-administered encounter medications on file as of 11/12/2024.     Social History     Socioeconomic History    Marital status:      Spouse name: Not on file    Number of children: Not on file    Years of education: Not on file    Highest education level: Not on file   Occupational History    Not on file   Tobacco Use    Smoking status: Never    Smokeless tobacco: Never   Vaping Use    Vaping status: Never Used   Substance and Sexual Activity    Alcohol use: Yes     Alcohol/week: 2.4 - 3.0 oz     Types: 4 - 5 Cans of beer per week     Comment: occ    Drug use: Yes     Types: Inhaled     Comment: 2-3 x week PRN    Sexual activity: Not on file   Other Topics Concern    Not on file   Social History Narrative    Not on file     Social Drivers of Health     Financial Resource Strain: Not on file   Food Insecurity: No Food Insecurity (10/1/2024)    Hunger Vital Sign     Worried About Running Out of Food in the Last Year: Never true     Ran Out of Food in the Last Year: Never true   Transportation Needs: No Transportation Needs (10/1/2024)    PRAPARE - Transportation     Lack of Transportation (Medical): No     Lack of Transportation (Non-Medical): No   Physical Activity: Not on file   Stress: Not on file   Social Connections: Feeling Socially Integrated (10/14/2024)    OASIS : Social Isolation     Frequency of experiencing loneliness or isolation: Never   Intimate Partner Violence: Not At Risk (10/1/2024)    Humiliation, Afraid, Rape, and Kick questionnaire     Fear of Current or Ex-Partner: No     Emotionally Abused: No     Physically Abused: No     Sexually Abused: No   Housing  Stability: Low Risk  (10/1/2024)    Housing Stability Vital Sign     Unable to Pay for Housing in the Last Year: No     Number of Times Moved in the Last Year: 1     Homeless in the Last Year: No         ROS:   10 point review systems is otherwise negative except as per the HPI    Chief Complaint   Patient presents with    Follow-Up     1 month TAVR

## 2024-11-12 NOTE — PROGRESS NOTES
Valve Program Functional Assessment:     [id number]   1a) Showering/bathin  1b) Walking 1 block on ground: 5  1c) Hurrying or joggin  2) Swellin  3) Fatigue: 7  4) Shortness of breath: 7  5) Sleep sitting up: 5  6) Limited enjoyment of life: 5  7) Spend the rest of your life with HF: 5  8a) Hobbies, recreational activities:5  8b) Working or doing household chores:5  8c) Visiting family or friends: 5

## 2024-11-13 ENCOUNTER — PATIENT MESSAGE (OUTPATIENT)
Dept: CARDIOLOGY | Facility: MEDICAL CENTER | Age: 79
End: 2024-11-13
Payer: MEDICARE

## 2024-11-13 LAB
MYCOBACTERIUM SPEC CULT: NORMAL
RHODAMINE-AURAMINE STN SPEC: NORMAL
SIGNIFICANT IND 70042: NORMAL
SITE SITE: NORMAL
SOURCE SOURCE: NORMAL

## 2024-11-14 ENCOUNTER — PATIENT MESSAGE (OUTPATIENT)
Dept: CARDIOLOGY | Facility: MEDICAL CENTER | Age: 79
End: 2024-11-14
Payer: MEDICARE

## 2024-11-18 NOTE — PATIENT COMMUNICATION
To BE: Patient states he discussed discontinuing oxygen with you at office appointment and is requesting a letter stating we are okay with him discontinuing. Typically we defer to primary care but patient states he already spoke to you. Please advise if okay to provide letter or other recommendations. Thank you!

## 2024-11-18 NOTE — PATIENT COMMUNICATION
Feroz Qiu M.D.  You1 hour ago (10:37 AM)     Either way is ok with me. Happy to give letter DC Oxygen. I think [facility] started it and not sure who needs to DC it.  Thx  BE       Letter drafted and sent as requested.

## 2025-02-20 ENCOUNTER — TELEPHONE (OUTPATIENT)
Dept: CARDIOLOGY | Facility: MEDICAL CENTER | Age: 80
End: 2025-02-20
Payer: MEDICARE

## 2025-02-21 NOTE — TELEPHONE ENCOUNTER
BE    Caller: Juan Manuel Martinez    Topic/issue: Patient called and would like to speak to Cristina. He said he will be available for the next hour.     Callback Number: [phone]    Thank you,     Sandra CORNEJO

## 2025-02-21 NOTE — TELEPHONE ENCOUNTER
BE  Caller: Juan Manuel Martinez     Topic/issue:FYI - patient will get his labs done Monday 2/24/25.    Callback Number: [phone]      Thank you  Rachel OBRIEN

## 2025-02-24 ENCOUNTER — HOSPITAL ENCOUNTER (OUTPATIENT)
Dept: LAB | Facility: MEDICAL CENTER | Age: 80
End: 2025-02-24
Attending: NURSE PRACTITIONER
Payer: MEDICARE

## 2025-02-24 ENCOUNTER — RESULTS FOLLOW-UP (OUTPATIENT)
Dept: CARDIOLOGY | Facility: MEDICAL CENTER | Age: 80
End: 2025-02-24

## 2025-02-24 ENCOUNTER — HOSPITAL ENCOUNTER (OUTPATIENT)
Dept: LAB | Facility: MEDICAL CENTER | Age: 80
End: 2025-02-24
Attending: INTERNAL MEDICINE
Payer: MEDICARE

## 2025-02-24 DIAGNOSIS — I50.33 ACUTE ON CHRONIC DIASTOLIC HEART FAILURE DUE TO VALVULAR DISEASE (HCC): ICD-10-CM

## 2025-02-24 DIAGNOSIS — Z95.2 S/P TAVR (TRANSCATHETER AORTIC VALVE REPLACEMENT): ICD-10-CM

## 2025-02-24 DIAGNOSIS — I38 ACUTE ON CHRONIC DIASTOLIC HEART FAILURE DUE TO VALVULAR DISEASE (HCC): ICD-10-CM

## 2025-02-24 LAB
ALBUMIN SERPL BCP-MCNC: 4.2 G/DL (ref 3.2–4.9)
ALBUMIN SERPL BCP-MCNC: 4.3 G/DL (ref 3.2–4.9)
ALBUMIN/GLOB SERPL: 1.4 G/DL
ALBUMIN/GLOB SERPL: 1.5 G/DL
ALP SERPL-CCNC: 34 U/L (ref 30–99)
ALP SERPL-CCNC: 35 U/L (ref 30–99)
ALT SERPL-CCNC: 24 U/L (ref 2–50)
ALT SERPL-CCNC: 24 U/L (ref 2–50)
ANION GAP SERPL CALC-SCNC: 12 MMOL/L (ref 7–16)
ANION GAP SERPL CALC-SCNC: 13 MMOL/L (ref 7–16)
AST SERPL-CCNC: 29 U/L (ref 12–45)
AST SERPL-CCNC: 30 U/L (ref 12–45)
BASOPHILS # BLD AUTO: 0.6 % (ref 0–1.8)
BASOPHILS # BLD AUTO: 0.7 % (ref 0–1.8)
BASOPHILS # BLD: 0.05 K/UL (ref 0–0.12)
BASOPHILS # BLD: 0.06 K/UL (ref 0–0.12)
BILIRUB SERPL-MCNC: 0.3 MG/DL (ref 0.1–1.5)
BILIRUB SERPL-MCNC: 0.3 MG/DL (ref 0.1–1.5)
BUN SERPL-MCNC: 33 MG/DL (ref 8–22)
BUN SERPL-MCNC: 33 MG/DL (ref 8–22)
CALCIUM ALBUM COR SERPL-MCNC: 9.7 MG/DL (ref 8.5–10.5)
CALCIUM ALBUM COR SERPL-MCNC: 9.9 MG/DL (ref 8.5–10.5)
CALCIUM SERPL-MCNC: 10.1 MG/DL (ref 8.5–10.5)
CALCIUM SERPL-MCNC: 9.9 MG/DL (ref 8.5–10.5)
CHLORIDE SERPL-SCNC: 104 MMOL/L (ref 96–112)
CHLORIDE SERPL-SCNC: 107 MMOL/L (ref 96–112)
CO2 SERPL-SCNC: 22 MMOL/L (ref 20–33)
CO2 SERPL-SCNC: 23 MMOL/L (ref 20–33)
CREAT SERPL-MCNC: 1.22 MG/DL (ref 0.5–1.4)
CREAT SERPL-MCNC: 1.22 MG/DL (ref 0.5–1.4)
EOSINOPHIL # BLD AUTO: 0.08 K/UL (ref 0–0.51)
EOSINOPHIL # BLD AUTO: 0.1 K/UL (ref 0–0.51)
EOSINOPHIL NFR BLD: 0.9 % (ref 0–6.9)
EOSINOPHIL NFR BLD: 1.1 % (ref 0–6.9)
ERYTHROCYTE [DISTWIDTH] IN BLOOD BY AUTOMATED COUNT: 53.2 FL (ref 35.9–50)
ERYTHROCYTE [DISTWIDTH] IN BLOOD BY AUTOMATED COUNT: 54.4 FL (ref 35.9–50)
FERRITIN SERPL-MCNC: 120 NG/ML (ref 22–322)
GFR SERPLBLD CREATININE-BSD FMLA CKD-EPI: 60 ML/MIN/1.73 M 2
GFR SERPLBLD CREATININE-BSD FMLA CKD-EPI: 60 ML/MIN/1.73 M 2
GLOBULIN SER CALC-MCNC: 2.9 G/DL (ref 1.9–3.5)
GLOBULIN SER CALC-MCNC: 3 G/DL (ref 1.9–3.5)
GLUCOSE SERPL-MCNC: 150 MG/DL (ref 65–99)
GLUCOSE SERPL-MCNC: 153 MG/DL (ref 65–99)
HCT VFR BLD AUTO: 44.2 % (ref 42–52)
HCT VFR BLD AUTO: 44.6 % (ref 42–52)
HGB BLD-MCNC: 14 G/DL (ref 14–18)
HGB BLD-MCNC: 14.4 G/DL (ref 14–18)
IMM GRANULOCYTES # BLD AUTO: 0.06 K/UL (ref 0–0.11)
IMM GRANULOCYTES # BLD AUTO: 0.06 K/UL (ref 0–0.11)
IMM GRANULOCYTES NFR BLD AUTO: 0.7 % (ref 0–0.9)
IMM GRANULOCYTES NFR BLD AUTO: 0.7 % (ref 0–0.9)
IRON SATN MFR SERPL: 22 % (ref 15–55)
IRON SERPL-MCNC: 92 UG/DL (ref 50–180)
LDH SERPL L TO P-CCNC: 397 U/L (ref 107–266)
LYMPHOCYTES # BLD AUTO: 1.24 K/UL (ref 1–4.8)
LYMPHOCYTES # BLD AUTO: 1.26 K/UL (ref 1–4.8)
LYMPHOCYTES NFR BLD: 13.8 % (ref 22–41)
LYMPHOCYTES NFR BLD: 14.2 % (ref 22–41)
MCH RBC QN AUTO: 29.5 PG (ref 27–33)
MCH RBC QN AUTO: 31.2 PG (ref 27–33)
MCHC RBC AUTO-ENTMCNC: 31.4 G/DL (ref 32.3–36.5)
MCHC RBC AUTO-ENTMCNC: 32.6 G/DL (ref 32.3–36.5)
MCV RBC AUTO: 93.9 FL (ref 81.4–97.8)
MCV RBC AUTO: 95.7 FL (ref 81.4–97.8)
MONOCYTES # BLD AUTO: 1.03 K/UL (ref 0–0.85)
MONOCYTES # BLD AUTO: 1.04 K/UL (ref 0–0.85)
MONOCYTES NFR BLD AUTO: 11.5 % (ref 0–13.4)
MONOCYTES NFR BLD AUTO: 11.7 % (ref 0–13.4)
NEUTROPHILS # BLD AUTO: 6.38 K/UL (ref 1.82–7.42)
NEUTROPHILS # BLD AUTO: 6.47 K/UL (ref 1.82–7.42)
NEUTROPHILS NFR BLD: 71.9 % (ref 44–72)
NEUTROPHILS NFR BLD: 72.2 % (ref 44–72)
NRBC # BLD AUTO: 0 K/UL
NRBC # BLD AUTO: 0 K/UL
NRBC BLD-RTO: 0 /100 WBC (ref 0–0.2)
NRBC BLD-RTO: 0 /100 WBC (ref 0–0.2)
NT-PROBNP SERPL IA-MCNC: 362 PG/ML (ref 0–125)
PLATELET # BLD AUTO: 166 K/UL (ref 164–446)
PLATELET # BLD AUTO: 168 K/UL (ref 164–446)
PMV BLD AUTO: 12 FL (ref 9–12.9)
PMV BLD AUTO: 12.5 FL (ref 9–12.9)
POTASSIUM SERPL-SCNC: 4.6 MMOL/L (ref 3.6–5.5)
POTASSIUM SERPL-SCNC: 4.8 MMOL/L (ref 3.6–5.5)
PROT SERPL-MCNC: 7.2 G/DL (ref 6–8.2)
PROT SERPL-MCNC: 7.2 G/DL (ref 6–8.2)
RBC # BLD AUTO: 4.62 M/UL (ref 4.7–6.1)
RBC # BLD AUTO: 4.75 M/UL (ref 4.7–6.1)
SODIUM SERPL-SCNC: 140 MMOL/L (ref 135–145)
SODIUM SERPL-SCNC: 141 MMOL/L (ref 135–145)
TIBC SERPL-MCNC: 420 UG/DL (ref 250–450)
UIBC SERPL-MCNC: 328 UG/DL (ref 110–370)
WBC # BLD AUTO: 8.9 K/UL (ref 4.8–10.8)
WBC # BLD AUTO: 9 K/UL (ref 4.8–10.8)

## 2025-02-24 PROCEDURE — 82728 ASSAY OF FERRITIN: CPT

## 2025-02-24 PROCEDURE — 83550 IRON BINDING TEST: CPT

## 2025-02-24 PROCEDURE — 83540 ASSAY OF IRON: CPT

## 2025-02-24 PROCEDURE — 83880 ASSAY OF NATRIURETIC PEPTIDE: CPT

## 2025-02-24 PROCEDURE — 85025 COMPLETE CBC W/AUTO DIFF WBC: CPT

## 2025-02-24 PROCEDURE — 36415 COLL VENOUS BLD VENIPUNCTURE: CPT

## 2025-02-24 PROCEDURE — 83615 LACTATE (LD) (LDH) ENZYME: CPT

## 2025-02-24 PROCEDURE — 80053 COMPREHEN METABOLIC PANEL: CPT

## 2025-02-24 PROCEDURE — 80053 COMPREHEN METABOLIC PANEL: CPT | Mod: 91

## 2025-02-24 PROCEDURE — 85025 COMPLETE CBC W/AUTO DIFF WBC: CPT | Mod: 91

## 2025-02-25 ENCOUNTER — OFFICE VISIT (OUTPATIENT)
Dept: CARDIOLOGY | Facility: MEDICAL CENTER | Age: 80
End: 2025-02-25
Attending: INTERNAL MEDICINE
Payer: MEDICARE

## 2025-02-25 ENCOUNTER — RESULTS FOLLOW-UP (OUTPATIENT)
Dept: CARDIOLOGY | Facility: PHYSICIAN GROUP | Age: 80
End: 2025-02-25

## 2025-02-25 VITALS
DIASTOLIC BLOOD PRESSURE: 60 MMHG | BODY MASS INDEX: 27.49 KG/M2 | WEIGHT: 192 LBS | OXYGEN SATURATION: 94 % | SYSTOLIC BLOOD PRESSURE: 124 MMHG | HEART RATE: 81 BPM | HEIGHT: 70 IN | RESPIRATION RATE: 16 BRPM

## 2025-02-25 DIAGNOSIS — I10 PRIMARY HYPERTENSION: ICD-10-CM

## 2025-02-25 DIAGNOSIS — Z95.2 HISTORY OF TRANSCATHETER AORTIC VALVE REPLACEMENT (TAVR): ICD-10-CM

## 2025-02-25 DIAGNOSIS — T82.03XD PARAVALVULAR LEAK OF PROSTHETIC HEART VALVE, SUBSEQUENT ENCOUNTER: ICD-10-CM

## 2025-02-25 PROBLEM — I34.2 NONRHEUMATIC MITRAL VALVE STENOSIS: Status: ACTIVE | Noted: 2024-08-21

## 2025-02-25 PROCEDURE — 99213 OFFICE O/P EST LOW 20 MIN: CPT | Performed by: INTERNAL MEDICINE

## 2025-02-25 RX ORDER — METOPROLOL SUCCINATE 25 MG/1
25 TABLET, EXTENDED RELEASE ORAL EVERY EVENING
Qty: 100 TABLET | Refills: 3
Start: 2025-02-25

## 2025-02-25 ASSESSMENT — FIBROSIS 4 INDEX: FIB4 SCORE: 2.85

## 2025-02-25 NOTE — PROGRESS NOTES
"CARDIOLOGY OUTPATIENT FOLLOWUP    PCP: Francois Penaloza M.D.    1. History of transcatheter aortic valve replacement (TAVR)    2. Primary hypertension    3. Paravalvular leak of prosthetic heart valve, subsequent encounter        Juan Manuel Martinez is stable from a cardiovascular standpoint with class I status.  Accordingly, no changes to medication regimen were advised.  Risk factors are under good control.    Follow up: 6 months    History: Juan Manuel Martinez is a 80 y.o. male with history of TAVR September 2024, mild to moderate PVL, mitral gradient of 8 at 78 bpm, moderate MR normal LVEF presenting for follow-up.  He is accompanied by his wife Ely who is a retired RN.    At her last visit he was experiencing symptoms and it was unclear whether these were related to the mitral valve or aortic regurgitation.  I had intended to escalate beta-blockade though this never happened.  He is now feeling excellent.  A subsequent echocardiogram has shown mild aortic paravalvular leak      Physical Exam:  /60 (BP Location: Left arm, Patient Position: Sitting, BP Cuff Size: Adult)   Pulse 81   Resp 16   Ht 1.778 m (5' 10\")   Wt 87.1 kg (192 lb)   SpO2 94%   BMI 27.55 kg/m²   GEN: NAD  CARDIAC: regular early peaking systolic ejection murmur somewhat harsh sound  RESP: Clear to auscultation bilaterally  ABD: Soft, non-tender, non-distended  EXT: No edema  NEURO: No focal deficit    () Today's E/M visit is associated with medical care services that serve as the continuing focal point for all needed health care services and/or with medical care services that  are part of ongoing care related to a patient's single, serious condition, or a complex condition: This includes  furnishing services to patients on an ongoing basis that result in care that is personalized  to the patient. The services result in a comprehensive, longitudinal, and continuous  relationship with the patient and involve delivery of team-based " care that is accessible, coordinated with other practitioners and providers, and integrated with the broader health  care landscape.     The ASCVD Risk score (Zander LANCE, et al., 2019) failed to calculate.    Studies  Lab Results   Component Value Date/Time    CHOLSTRLTOT 145 10/01/2024 04:22 AM    LDL 75 10/01/2024 04:22 AM    HDL 41 10/01/2024 04:22 AM    TRIGLYCERIDE 143 10/01/2024 04:22 AM       Lab Results   Component Value Date/Time    SODIUM 140 02/24/2025 09:04 AM    POTASSIUM 4.6 02/24/2025 09:04 AM    CHLORIDE 104 02/24/2025 09:04 AM    CO2 23 02/24/2025 09:04 AM    GLUCOSE 150 (H) 02/24/2025 09:04 AM    BUN 33 (H) 02/24/2025 09:04 AM    CREATININE 1.22 02/24/2025 09:04 AM      Lab Results   Component Value Date/Time    PROTHROMBTM 14.9 (H) 09/30/2024 11:25 AM    INR 1.17 (H) 09/30/2024 11:25 AM      Lab Results   Component Value Date/Time    WBC 9.0 02/24/2025 09:04 AM    RBC 4.62 (L) 02/24/2025 09:04 AM    HEMOGLOBIN 14.4 02/24/2025 09:04 AM    HEMATOCRIT 44.2 02/24/2025 09:04 AM    MCV 95.7 02/24/2025 09:04 AM    MCH 31.2 02/24/2025 09:04 AM    MCHC 32.6 02/24/2025 09:04 AM    MPV 12.5 02/24/2025 09:04 AM    NEUTSPOLYS 72.20 (H) 02/24/2025 09:04 AM    LYMPHOCYTES 13.80 (L) 02/24/2025 09:04 AM    MONOCYTES 11.50 02/24/2025 09:04 AM    EOSINOPHILS 1.10 02/24/2025 09:04 AM    BASOPHILS 0.70 02/24/2025 09:04 AM        Past Medical History:   Diagnosis Date    Breath shortness     Cancer ([facility]) 2012    prostate    Dental disorder     partial denture    Diabetes ([facility])     Heart murmur     High cholesterol     Hypertension     Pneumonia 08/30/2024    Prostate cancer ([facility])     Prostate cancer (HCC)     Severe aortic stenosis 08/20/2024    Severe mitral valve stenosis 08/21/2024     No Known Allergies  Outpatient Encounter Medications as of 2/25/2025   Medication Sig Dispense Refill    metoprolol SR (TOPROL XL) 25 MG TABLET SR 24 HR Take 1 Tablet by mouth every evening. Indications: Cardiac Failure 100 Tablet 3     spironolactone (ALDACTONE) 25 MG Tab Take 1 Tablet by mouth every day. Indications: High Blood Pressure 100 Tablet 3    losartan (COZAAR) 100 MG Tab Take 1 Tablet by mouth every day. Indications: High Blood Pressure 100 Tablet 3    aspirin 81 MG EC tablet Take 1 Tablet by mouth every evening. 30 Tablet 0    acetaminophen (TYLENOL) 500 MG Tab Take 1,000 mg by mouth every 6 hours as needed for Moderate Pain. Indications: Pain      Probiotic Product (PROBIOTIC 10 ULTRA STRENGTH) Cap Take 1 Capsule by mouth every 48 hours. Indications: supplement      Krill Oil 500 MG Cap Take 500 mg by mouth every day. Indications: supplement      Cholecalciferol (VITAMIN D3) 125 MCG (5000 UT) Cap Take 1 Capsule by mouth every day. Indications: supplement      B Complex Vitamins (VITAMIN B COMPLEX PO) Take 1 Tablet by mouth every day. Indications: supplements      fenofibrate (TRIGLIDE) 160 MG tablet Take 160 mg by mouth every day. Indications: High Amount of Triglycerides in the Blood      metformin (GLUCOPHAGE) 1000 MG tablet Take 1,000 mg by mouth 2 times a day with meals. Indications: Type 2 Diabetes      MYRBETRIQ 50 MG TABLET SR 24 HR Take 50 mg by mouth every evening. Indications: Overactive Bladder      pravastatin (PRAVACHOL) 40 MG tablet Take 40 mg by mouth every evening. Indications: High Amount of Fats in the Blood      [DISCONTINUED] furosemide (LASIX) 40 MG Tab Take 1 Tablet by mouth 1 time a day as needed (swelling). Indications: Cardiac Failure (Patient not taking: Reported on 2/25/2025) 100 Tablet 3    [DISCONTINUED] metoprolol SR (TOPROL XL) 50 MG TABLET SR 24 HR Take 1 Tablet by mouth every evening. Indications: Cardiac Failure (Patient taking differently: Take 50 mg by mouth every evening. Taking 0.5 tab   Indications: Cardiac Failure) 100 Tablet 3    [DISCONTINUED] Home Care Oxygen Inhale 2 L/min continuous. Oxygen dose: 2 L/min at night time  Respiratory route via: Nasal Cannula   Oxygen supplier: [facility]       Indications: hypoxia      [DISCONTINUED] nystatin (MYCOSTATIN) powder Apply 1 g topically 2 times a day. (Patient not taking: Reported on 2/25/2025) 30 g 0     No facility-administered encounter medications on file as of 2/25/2025.     Social History     Socioeconomic History    Marital status:      Spouse name: Not on file    Number of children: Not on file    Years of education: Not on file    Highest education level: Not on file   Occupational History    Not on file   Tobacco Use    Smoking status: Never    Smokeless tobacco: Never   Vaping Use    Vaping status: Never Used   Substance and Sexual Activity    Alcohol use: Yes     Alcohol/week: 2.4 - 3.0 oz     Types: 4 - 5 Cans of beer per week     Comment: occ    Drug use: Yes     Types: Inhaled     Comment: 2-3 x week PRN    Sexual activity: Not on file   Other Topics Concern    Not on file   Social History Narrative    Not on file     Social Drivers of Health     Financial Resource Strain: Not on file   Food Insecurity: No Food Insecurity (10/1/2024)    Hunger Vital Sign     Worried About Running Out of Food in the Last Year: Never true     Ran Out of Food in the Last Year: Never true   Transportation Needs: No Transportation Needs (10/1/2024)    PRAPARE - Transportation     Lack of Transportation (Medical): No     Lack of Transportation (Non-Medical): No   Physical Activity: Not on file   Stress: Not on file   Social Connections: Feeling Socially Integrated (10/14/2024)    OASIS : Social Isolation     Frequency of experiencing loneliness or isolation: Never   Intimate Partner Violence: Not At Risk (10/1/2024)    Humiliation, Afraid, Rape, and Kick questionnaire     Fear of Current or Ex-Partner: No     Emotionally Abused: No     Physically Abused: No     Sexually Abused: No   Housing Stability: Low Risk  (10/1/2024)    Housing Stability Vital Sign     Unable to Pay for Housing in the Last Year: No     Number of Times Moved in the Last Year: 1      Homeless in the Last Year: No         ROS:   10 point review systems is otherwise negative except as per the HPI    Chief Complaint   Patient presents with    Follow-Up     F/v Dx: S/P TAVR (transcatheter aortic valve replacement)      Hypertension       Primary hypertension    Congestive Heart Failure     Acute on 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

## 2025-05-28 ENCOUNTER — TELEPHONE (OUTPATIENT)
Dept: CARDIOLOGY | Facility: MEDICAL CENTER | Age: 80
End: 2025-05-28
Payer: MEDICARE

## 2025-05-28 NOTE — LETTER
PROCEDURE/SURGERY CLEARANCE FORM      Encounter Date: 5/28/2025    Patient: Juan Manuel Martinez  YOB: 1945    CARDIOLOGIST:  SHAYLA Schwarz.    REFERRING DOCTOR:  No ref. provider found    The following procedure/surgery: Teeth Cleaning                                            Additional comments: Okay to proceed. SBE prophylaxis needed. Hold medications if warranted. [place]     PROCEDURE/SURGERY CLEARANCE FORM    Date: 5/28/2025   Patient Name: Juan Manuel Martinez    Dear Surgeon or Proceduralist,      Thank you for your request for cardiac stratification of our mutual patient Juan Manuel Martinez 1945. We have reviewed their [facility] records; and to the best of our understanding this patient has not had stenting, ablation, watchman, cardiothoracic surgery or hospitalization for cardiovascular reasons in the past 6 months.  Juan Manuel Martinez has been seen within the past 15 months and is considered to have non-modifiable cardiac risk for this low-risk procedure/surgery. They may proceed from a cardiovascular standpoint and may hold their antiplatelet/anticoagulation as briefly as possible. Please have patient resume this medication when hemodynamically stable to do so.        Clopidrogrel or Ticagrelor  - hold 7 days for all neurological procedures, hold 5 days prior to all other procedure/surgery,  resume when hemodynamically stable     Warfarin - hold 7 days for all neurological procedures, hold 5 days prior to all other procedure/surgery and coordinate with [facility] Anticoagulation Clinic ([phone]) INR testing and dose management.      Pradaxa/Xarelto/Eliquis/Savesya - hold 1 day prior to procedure for low bleeding risk procedure, 2 days for high bleeding risk procedure, or consider holding 3 days or longer for patients with reduced kidney function (CrCl <30mL/min) or spinal/cranial surgeries/procedures.      If they have a mechanical heart valve, please coordinate with [facility]  Anticoagulation Service ([phone]) the proper management of their anticoagulant in the periprocedural or perioperative period.      Some patients have higher risk for cardiovascular complications or holding medication. If our patient has had prior complications of holding antiplatelet or anticoagulants in the past and we have seen them after these events, we have addressed these concerns with the patient. They are at an unknown degree of increased risk for recurrent complication.  You may hold anticoagulation/antiplatelets for the procedure or surgery if the benefits of the procedure or surgery outweigh this nonmodifiable risk.      If Juan Manuel Martinez 1945 has new symptoms of heart failure decompensation, unstable arrythmia, or angina please reach out and we will assess the patient.      If you have other patient-specific concerns, please feel free to reach out to the patient's cardiologist directly at [phone].     Thank you,       [facility]          Electronically Signed       MD Signature   SHAYLA Schwarz.

## 2025-05-28 NOTE — TELEPHONE ENCOUNTER
Last OV: 02.25.2025  Proposed Surgery: Teeth Cleaning   Surgery Date: 05.29.2025  Requesting Office Name: [facility]   Fax Number: [phone]  Preference of Location (default is surgery center unless specified by Cardiologist or JONATHAN)  Prior Clearance Addressed: No    Is this a general clearance? NO  Anticoags/Antiplatelets: Aspirin  Anticoags/Antiplatelet managed by Cardiology? YES    Outstanding Cardiac Imaging : Yes  Echo.   Clearance to provider to review and Other CL-LEFT HEART CATHETERIZATION WITH POSSIBLE INTERVENTION  Clearance to provider to review  Ablation, Cardioversion, Stent, Cardiac Devices, Catheterization, Watchman: Yes  Date : 09.23.2025 and 09.30.2024   TAVR/Valve, Mitral Clip, Watchman (including open heart),: Completed over 6 months post procedure- Send clearance letter    Recent Cardiac Hospitalization: No            When: N/A  History (cardiac history): Past Medical History[1]        Is this a dental clearance? YES   Ablation, Cardioversion, Watchman, Stents, Cath, Devices within the last 3 months? No   If yes- Send dental wait letter, do not forward to provider for review.     TAVR / Valve, Mitral clip within the last 6 months? No  If yes- Send dental wait letter, do not forward to provider for review.     If completing a general clearance, continue per protocol.           Surgical Clearance Letter Sent: No Provider to advise.   **Scan clearance request letter into [facility].**        [1]   Past Medical History:  Diagnosis Date    Breath shortness     Cancer (HCC) 2012    prostate    Dental disorder     partial denture    Diabetes (HCC)     Heart murmur     High cholesterol     Hypertension     Pneumonia 08/30/2024    Prostate cancer (HCC)     Prostate cancer (HCC)     Severe aortic stenosis 08/20/2024    Severe mitral valve stenosis 08/21/2024

## 2025-05-28 NOTE — TELEPHONE ENCOUNTER
BE       Caller: Mariann     Office Name, phone number, fax number: [facility]     Procedure Name: Teeth Cleaning     Procedure Scheduled Date: 5/29/25    Callback Number: [phone]  Fax Number: [phone]    Thank You   Danii HONG

## 2025-08-27 ENCOUNTER — HOSPITAL ENCOUNTER (OUTPATIENT)
Dept: CARDIOLOGY | Facility: MEDICAL CENTER | Age: 80
End: 2025-08-27
Attending: INTERNAL MEDICINE
Payer: MEDICARE

## 2025-08-27 DIAGNOSIS — I35.0 SEVERE AORTIC STENOSIS: ICD-10-CM

## 2025-08-27 LAB
LV EJECT FRACT  99904: 62
LV EJECT FRACT MOD 2C 99903: 59.89
LV EJECT FRACT MOD 4C 99902: 64.09
LV EJECT FRACT MOD BP 99901: 62

## 2025-08-27 PROCEDURE — 93306 TTE W/DOPPLER COMPLETE: CPT | Mod: 26 | Performed by: INTERNAL MEDICINE

## 2025-08-27 PROCEDURE — 93306 TTE W/DOPPLER COMPLETE: CPT

## (undated) DEVICE — Device

## (undated) DEVICE — DECANTER FLD BLS - (50/CA)

## (undated) DEVICE — PACK TAVR (3EA/CA)

## (undated) DEVICE — TUBING CLEARLINK DUO-VENT - C-FLO (48EA/CA)

## (undated) DEVICE — COVER LIGHT HANDLE ALC PLUS DISP (18EA/BX)

## (undated) DEVICE — SHEATH RO 6F 25CM (10EA/BX)

## (undated) DEVICE — ARM BAND RADIAL TR BAND (5EA/BX)

## (undated) DEVICE — CHLORAPREP 26 ML APPLICATOR - ORANGE TINT(25/CA)

## (undated) DEVICE — GLIDESHEATH SLENDER NITINOL KIT .021 GW 6FR 10CM SINGLE WALL

## (undated) DEVICE — SUTURE 0 ETHIBOND CT-1 30 IN (36PK/BX)

## (undated) DEVICE — BLADE SURGICAL #11 - (50/BX)

## (undated) DEVICE — CRIMPER CATHETER EDWARDS DISPOSABLE (1EA)

## (undated) DEVICE — DRAPE MAYO STAND - (30/CA)

## (undated) DEVICE — ELECTRODE RADIOLUCNT SOLID GEL DEFIB PADS (12EA/CA)

## (undated) DEVICE — SHEATH DESTINATION WITH DILATOR 6FR 45CM

## (undated) DEVICE — CATHETER 6FR AL1 100CM (5/BX)

## (undated) DEVICE — WIRE GUIDE LUNDQST.035X180 - TSMG-35-180-4-LES ORDER BY BOX (5EA/BX)

## (undated) DEVICE — INTRODUCER SHEATH 6FR 2.5CM - DILATOR PROTRUDING (10/BX)

## (undated) DEVICE — WIRE GUIDE AES .035 260CM WITH 3MM J TIP"

## (undated) DEVICE — KIT RETROFIT PROBE COVERS (24EA/EA)

## (undated) DEVICE — INTRODUCER CATHETER DILATOR PROTRUDING 8FR 2.5CM (10EA/BX)

## (undated) DEVICE — IV TUBING HI-FLO RATE W/CLAMP (50/CA)

## (undated) DEVICE — LACTATED RINGERS INJ 1000 ML - (14EA/CA 60CA/PF)

## (undated) DEVICE — CATHETER PIGTAIL 6FR 145 (5EA/BX)

## (undated) DEVICE — TOWELS CLOTH SURGICAL - (4/PK 20PK/CA)

## (undated) DEVICE — GOWN SURGEONS X-LARGE - DISP. (30/CA)

## (undated) DEVICE — SUCTION INSTRUMENT YANKAUER BULBOUS TIP W/O VENT (50EA/CA)

## (undated) DEVICE — CANISTER SUCTION 3000ML MECHANICAL FILTER AUTO SHUTOFF MEDI-VAC NONSTERILE LF DISP (40EA/CA)

## (undated) DEVICE — SYR ANGIO CNRST INJ HI-PRS 3W 65 - (10EA/CA)"

## (undated) DEVICE — STOPCOCK IV 400 PSI 3W ROT (50EA/BX)

## (undated) DEVICE — SYSTEM DELIVERY COMMANDER TAVR KIT 26MM COMPONENT (1EA)

## (undated) DEVICE — GLOVESZ 8.5 BIOGEL PI MICRO - PF LF (50PR/BX)

## (undated) DEVICE — ELECTRODE DUAL RETURN W/ CORD - (50/PK)

## (undated) DEVICE — CABLE TEMPORARY PACING

## (undated) DEVICE — DEVICE INFLATION ATRION NOVALFEX TRANSFEMORAL SYSTEM (1EA)

## (undated) DEVICE — COVER FOOT UNIVERSAL DISP. - (25EA/CA)

## (undated) DEVICE — GUIDEWIRE STARTER STRAIGHT FIXED CORE .035 150CM 4 STRAIGHT PTFE/HEPARIN COATED (10/BX)

## (undated) DEVICE — SET EXTENSION WITH 2 PORTS (48EA/CA) ***PART #2C8610 IS A SUBSTITUTE*****

## (undated) DEVICE — SUTURE DEVICE CLOSURE REPAIR SYSTEM PERCLOSE PROSTYLE (10EA/PK)

## (undated) DEVICE — SENSOR OXIMETER ADULT SPO2 RD SET (20EA/BX)

## (undated) DEVICE — DRAPE CLEAR W/ELASTIC BAND RAD CARM 40 X40" (20EA/CA)"

## (undated) DEVICE — COVER LIGHT HANDLE FLEXIBLE - SOFT (2EA/PK 80PK/CA)